# Patient Record
Sex: FEMALE | Race: WHITE | Employment: UNEMPLOYED | URBAN - METROPOLITAN AREA
[De-identification: names, ages, dates, MRNs, and addresses within clinical notes are randomized per-mention and may not be internally consistent; named-entity substitution may affect disease eponyms.]

---

## 2017-01-09 DIAGNOSIS — J45.21 MILD INTERMITTENT ASTHMA WITH ACUTE EXACERBATION: ICD-10-CM

## 2017-01-09 RX ORDER — ALBUTEROL SULFATE 90 UG/1
2 AEROSOL, METERED RESPIRATORY (INHALATION)
Qty: 3 INHALER | Refills: 3 | Status: SHIPPED | OUTPATIENT
Start: 2017-01-09 | End: 2019-01-03 | Stop reason: ALTCHOICE

## 2017-02-13 RX ORDER — BUPROPION HYDROCHLORIDE 150 MG/1
TABLET ORAL
Qty: 90 TAB | Refills: 0 | Status: SHIPPED | OUTPATIENT
Start: 2017-02-13 | End: 2019-01-03 | Stop reason: ALTCHOICE

## 2017-08-18 RX ORDER — CLONAZEPAM 0.5 MG/1
TABLET ORAL
Qty: 30 TAB | Refills: 1 | OUTPATIENT
Start: 2017-08-18

## 2017-08-18 RX ORDER — LEVOTHYROXINE SODIUM 112 UG/1
TABLET ORAL
Qty: 90 TAB | Refills: 3 | Status: SHIPPED | OUTPATIENT
Start: 2017-08-18 | End: 2019-01-03 | Stop reason: ALTCHOICE

## 2018-03-22 ENCOUNTER — OFFICE VISIT (OUTPATIENT)
Dept: FAMILY MEDICINE CLINIC | Age: 63
End: 2018-03-22

## 2018-03-22 VITALS
HEART RATE: 67 BPM | OXYGEN SATURATION: 97 % | DIASTOLIC BLOOD PRESSURE: 80 MMHG | TEMPERATURE: 97.5 F | WEIGHT: 162 LBS | RESPIRATION RATE: 20 BRPM | SYSTOLIC BLOOD PRESSURE: 136 MMHG | BODY MASS INDEX: 30.58 KG/M2 | HEIGHT: 61 IN

## 2018-03-22 DIAGNOSIS — N39.0 URINARY TRACT INFECTION WITHOUT HEMATURIA, SITE UNSPECIFIED: ICD-10-CM

## 2018-03-22 DIAGNOSIS — R30.9 PAIN PASSING URINE: Primary | ICD-10-CM

## 2018-03-22 LAB
BILIRUB UR QL STRIP: NEGATIVE
GLUCOSE UR-MCNC: NEGATIVE MG/DL
KETONES P FAST UR STRIP-MCNC: NEGATIVE MG/DL
PH UR STRIP: 7 [PH] (ref 4.6–8)
PROT UR QL STRIP: NEGATIVE
SP GR UR STRIP: 1.02 (ref 1–1.03)
UA UROBILINOGEN AMB POC: NORMAL (ref 0.2–1)
URINALYSIS CLARITY POC: CLEAR
URINALYSIS COLOR POC: YELLOW
URINE BLOOD POC: NEGATIVE
URINE LEUKOCYTES POC: NORMAL
URINE NITRITES POC: NEGATIVE

## 2018-03-22 RX ORDER — CIPROFLOXACIN 250 MG/1
250 TABLET, FILM COATED ORAL EVERY 12 HOURS
Qty: 20 TAB | Refills: 0 | Status: SHIPPED | OUTPATIENT
Start: 2018-03-22 | End: 2018-04-01

## 2018-03-22 RX ORDER — NAPROXEN 500 MG/1
500 TABLET ORAL 2 TIMES DAILY WITH MEALS
Qty: 60 TAB | Refills: 2 | Status: SHIPPED | OUTPATIENT
Start: 2018-03-22 | End: 2019-01-03 | Stop reason: ALTCHOICE

## 2018-03-22 NOTE — MR AVS SNAPSHOT
[address] [address] 
[phone] Patient: Jarvis Gillette MRN:  [id number] Visit Information Date & Time Provider Department Dept. Phone Encounter #  
 3/22/2018  2:40 PM Kaylee Fisher MD [address] [facility] [phone] [id number] Follow-up Instructions Return if symptoms worsen or fail to improve. Upcoming Health Maintenance Date Due  
 BREAST CANCER SCRN MAMMOGRAM 3/8/2007 FOBT Q 1 YEAR AGE 50-75 3/8/2007 ZOSTER VACCINE AGE 60> 1/8/2017 Influenza Age 5 to Adult 8/1/2017 DTaP/Tdap/Td series (2 - Td) 8/19/2024 Allergies as of 3/22/2018  Review Complete On: 3/22/2018 By: Kaylee Fisher MD  
 No Known Allergies Current Immunizations  Reviewed on 10/20/2016 Name Date Influenza Vaccine 10/17/2016 Influenza Vaccine Tom Ricardo) 11/2/2015 Influenza Vaccine (Quad) PF 9/16/2014 Pneumococcal Polysaccharide (PPSV-23) 11/2/2015 Tdap 8/19/2014 Not reviewed this visit You Were Diagnosed With   
  
 Codes Comments Pain passing urine    -  Primary ICD-10-CM: R30.9 ICD-9-CM: 788.1 Urinary tract infection without hematuria, site unspecified     ICD-10-CM: N39.0 ICD-9-CM: 599.0 Vitals BP Pulse Temp Resp Height(growth percentile) Weight(growth percentile) 136/80 67 97.5 °F (36.4 °C) 20 5' 1\" (1.549 m) 162 lb (73.5 kg) SpO2 BMI OB Status Smoking Status 97% 30.61 kg/m2 Hysterectomy Current Every Day Smoker Vitals History BMI and BSA Data Body Mass Index Body Surface Area  
 30.61 kg/m 2 1.78 m 2 Preferred Pharmacy Pharmacy Name Phone [facility] [phone] Your Updated Medication List  
  
   
This list is accurate as of 3/22/18  3:48 PM.  Always use your most recent med list.  
  
  
  
  
 AFLURIA [id number] (PF) Syrg injection Generic drug:  influenza vaccine 2016-17 (5 yr+)(PF)  
  
 albuterol 90 mcg/actuation inhaler Commonly known as:  PROVENTIL HFA, VENTOLIN HFA, PROAIR HFA Take 2 Puffs by inhalation every four (4) hours as needed for Wheezing or Shortness of Breath. Blood-Glucose Meter monitoring kit Dx: E16.2, R55  
  
 buPROPion  mg tablet Commonly known as:  WELLBUTRIN XL  
TAKE ONE TABLET BY MOUTH EVERY MORNING  
  
 ciprofloxacin HCl 250 mg tablet Commonly known as:  CIPRO Take 1 Tab by mouth every twelve (12) hours for 10 days. clonazePAM 1 mg tablet Commonly known as:  Cyn Gordo Take 1 Tab by mouth daily as needed. cyclobenzaprine 10 mg tablet Commonly known as:  FLEXERIL Take 1 Tab by mouth nightly as needed for Muscle Spasm(s). glucose blood VI test strips strip Commonly known as:  ASCENSIA AUTODISC VI, ONE TOUCH ULTRA TEST VI Testing BID, dx: E16.2, R55  
  
 hydroCHLOROthiazide 25 mg tablet Commonly known as:  HYDRODIURIL  
TAKE ONE TABLET BY MOUTH ONCE DAILY Lancets Misc One Touch Delica lancets, testing BID prn, dx: R55, E16.2  
  
 levothyroxine 112 mcg tablet Commonly known as:  SYNTHROID  
TAKE ONE TABLET BY MOUTH ONCE DAILY BEFORE BREAKFAST  
  
 meloxicam 15 mg tablet Commonly known as:  MOBIC  
TAKE ONE TABLET BY MOUTH ONCE DAILY  
  
 methylPREDNISolone 4 mg tablet Commonly known as:  Emmalene Cheers Use as directed  
  
 ondansetron 4 mg disintegrating tablet Commonly known as:  ZOFRAN ODT Take 1 Tab by mouth every eight (8) hours as needed for Nausea. Prescriptions Sent to Pharmacy Refills  
 ciprofloxacin HCl (CIPRO) 250 mg tablet 0 Sig: Take 1 Tab by mouth every twelve (12) hours for 10 days. Class: Normal  
 [facility] [address] [facility] [address] [place] Ph #: [phone] Route: Oral  
  
We Performed the Following AMB POC URINALYSIS DIP STICK AUTO W/O MICRO [89615 CPT(R)] CULTURE, URINE [id number] CPT(R)] Follow-up Instructions Return if symptoms worsen or fail to improve. Introducing [facility] SERVICES! Dear Jose Maria Groves: Thank you for requesting a Goby account. Our records indicate that you already have an active Goby account. You can access your account anytime at [email or web address] Did you know that you can access your hospital and ER discharge instructions at any time in Goby? You can also review all of your test results from your hospital stay or ER visit. Additional Information If you have questions, please visit the Frequently Asked Questions section of the Goby website at [email or web address] Remember, Goby is NOT to be used for urgent needs. For medical emergencies, dial 911. Now available from your iPhone and Android! Please provide this summary of care documentation to your next provider. Your primary care clinician is listed as Earle Richard. If you have any questions after today's visit, please call [phone].

## 2018-03-22 NOTE — PROGRESS NOTES
Patient here for burning urination. UA obtained as per verbal order per Dr. Mack Victor. 1. Have you been to the ER, urgent care clinic since your last visit? Hospitalized since your last visit? No    2. Have you seen or consulted any other health care providers outside of the [address] [place] [address] since your last visit? Include any pap smears or colon screening. No   Results for orders placed or performed in visit on 03/22/18   AMB POC URINALYSIS DIP STICK AUTO W/O MICRO   Result Value Ref Range    Color (UA POC) Yellow     Clarity (UA POC) Clear     Glucose (UA POC) Negative Negative    Bilirubin (UA POC) Negative Negative    Ketones (UA POC) Negative Negative    Specific gravity (UA POC) 1.025 1.001 - 1.035    Blood (UA POC) Negative Negative    pH (UA POC) 7 4.6 - 8.0    Protein (UA POC) Negative Negative    Urobilinogen (UA POC) 0.2 mg/dL 0.2 - 1    Nitrites (UA POC) Negative Negative    Leukocyte esterase (UA POC) Trace Negative         Chief Complaint   Patient presents with    Urinary Burning     She is a 66 y.o. female who presents for evalution. Reviewed PmHx, RxHx, FmHx, SocHx, AllgHx and updated and dated in the chart.     Patient Active Problem List    Diagnosis    Hyperglycemia    Cervical stenosis of spine    Vitamin D deficiency    Hypothyroid    Osteoarthritis    Asthma    Depression    HTN (hypertension), malignant       Review of Systems - negative except as listed above in the HPI    Objective:     Vitals:    03/22/18 1528   BP: 136/80   Pulse: 67   Resp: 20   Temp: 97.5 °F (36.4 °C)   SpO2: 97%   Weight: 162 lb (73.5 kg)   Height: 5' 1\" (1.549 m)     Physical Examination: General appearance - alert, well appearing, and in no distress  Chest - clear to auscultation, no wheezes, rales or rhonchi, symmetric air entry  Heart - normal rate, regular rhythm, normal S1, S2, no murmurs, rubs, clicks or gallops  Abdomen - tenderness noted suprapubic pain to palp    Assessment/ Plan: Diagnoses and all orders for this visit:    1. Pain passing urine  -     AMB POC URINALYSIS DIP STICK AUTO W/O MICRO-pos  -     CULTURE, URINE  -     ciprofloxacin HCl (CIPRO) 250 mg tablet; Take 1 Tab by mouth every twelve (12) hours for 10 days. 2. Urinary tract infection without hematuria, site unspecified  -     CULTURE, URINE  -     ciprofloxacin HCl (CIPRO) 250 mg tablet; Take 1 Tab by mouth every twelve (12) hours for 10 days. Follow-up Disposition:  Return if symptoms worsen or fail to improve. I have discussed the diagnosis with the patient and the intended plan as seen in the above orders. The patient understands and agrees with the plan. The patient has received an after-visit summary and questions were answered concerning future plans. Medication Side Effects and Warnings were discussed with patient  Patient Labs were reviewed and or requested:  Patient Past Records were reviewed and or requested    Fuentes Drake M.D. There are no Patient Instructions on file for this visit.

## 2018-03-23 LAB — BACTERIA UR CULT: NORMAL

## 2018-10-02 RX ORDER — ESCITALOPRAM OXALATE 10 MG/1
10 TABLET ORAL DAILY
Qty: 30 TAB | Refills: 2 | Status: SHIPPED | OUTPATIENT
Start: 2018-10-02 | End: 2018-10-25 | Stop reason: SDUPTHER

## 2018-10-25 ENCOUNTER — TELEPHONE (OUTPATIENT)
Dept: FAMILY MEDICINE CLINIC | Age: 63
End: 2018-10-25

## 2018-10-25 RX ORDER — ESCITALOPRAM OXALATE 20 MG/1
20 TABLET ORAL DAILY
Qty: 90 TAB | Refills: 1 | Status: SHIPPED | OUTPATIENT
Start: 2018-10-25 | End: 2019-01-03 | Stop reason: ALTCHOICE

## 2018-10-25 NOTE — TELEPHONE ENCOUNTER
Pt calling and states she wants to increase her dose of Lexapro from 10 mg to 20 mg and wants a new rx sent to her pharmacy. She can be reached at [phone].

## 2019-01-03 ENCOUNTER — OFFICE VISIT (OUTPATIENT)
Dept: FAMILY MEDICINE CLINIC | Age: 64
End: 2019-01-03

## 2019-01-03 VITALS
WEIGHT: 157 LBS | DIASTOLIC BLOOD PRESSURE: 74 MMHG | BODY MASS INDEX: 29.64 KG/M2 | SYSTOLIC BLOOD PRESSURE: 111 MMHG | RESPIRATION RATE: 18 BRPM | TEMPERATURE: 98.7 F | HEIGHT: 61 IN | OXYGEN SATURATION: 97 % | HEART RATE: 66 BPM

## 2019-01-03 DIAGNOSIS — Z98.84 S/P BARIATRIC SURGERY: ICD-10-CM

## 2019-01-03 DIAGNOSIS — E03.9 HYPOTHYROIDISM, UNSPECIFIED TYPE: Primary | ICD-10-CM

## 2019-01-03 NOTE — PROGRESS NOTES
Chief Complaint   Patient presents with   [facility]     pt states she is here to discuss lab results     Pt has labs done with her bariatric surgeon, wanted to follow up today due to concerns with continued hair thinning. Pt is not currently on any medication. Subjective: (As above and below)     Chief Complaint   Patient presents with   [facility]     pt states she is here to discuss lab results     she is a 66y.o. year old female who presents for evaluation. Reviewed PmHx, RxHx, FmHx, SocHx, AllgHx and updated in chart. Review of Systems - negative except as listed above    Objective:     Vitals:    01/03/19 0814   BP: 111/74   Pulse: 66   Resp: 18   Temp: 98.7 °F (37.1 °C)   TempSrc: Oral   SpO2: 97%   Weight: 157 lb (71.2 kg)   Height: 5' 1\" (1.549 m)     Physical Examination: General appearance - alert, well appearing, and in no distress  Mental status - alert, oriented to person, place, and time  Chest - clear to auscultation, no wheezes, rales or rhonchi, symmetric air entry  Heart - normal rate, regular rhythm, normal S1, S2, no murmurs, rubs, clicks or gallops    Assessment/ Plan:   1. Hypothyroidism, unspecified type  -TSH normal off medication    2. S/P bariatric surgery  -all labs stable       I have discussed the diagnosis with the patient and the intended plan as seen in the above orders. The patient has received an after-visit summary and questions were answered concerning future plans.      Medication Side Effects and Warnings were discussed with patient: yes  Patient Labs were reviewed: yes  Patient Past Records were reviewed:  yes    Anneliese Wood M.D.

## 2019-01-03 NOTE — PROGRESS NOTES
Chief Complaint   Patient presents with   [facility]     pt states she is here to discuss lab results     1. Have you been to the ER, urgent care clinic since your last visit? Hospitalized since your last visit? No    2. Have you seen or consulted any other health care providers outside of the [address] [place] [address] since your last visit? Include any pap smears or colon screening. Pt states she had he gallbladder removed a week before [place] but is unsure of the date. She states the procedure was performed by Dr. Kendal Snell.     Visit Vitals  /74 (BP 1 Location: Left arm, BP Patient Position: Sitting)   Pulse 66   Temp 98.7 °F (37.1 °C) (Oral)   Resp 18   Ht 5' 1\" (1.549 m)   Wt 157 lb (71.2 kg)   SpO2 97%   BMI 29.66 kg/m²

## 2019-06-06 RX ORDER — ESCITALOPRAM OXALATE 20 MG/1
TABLET ORAL
Qty: 90 TAB | Refills: 1 | Status: SHIPPED | OUTPATIENT
Start: 2019-06-06 | End: 2020-06-01 | Stop reason: SDUPTHER

## 2020-05-19 ENCOUNTER — VIRTUAL VISIT (OUTPATIENT)
Dept: FAMILY MEDICINE CLINIC | Age: 65
End: 2020-05-19

## 2020-05-19 VITALS — SYSTOLIC BLOOD PRESSURE: 128 MMHG | DIASTOLIC BLOOD PRESSURE: 75 MMHG

## 2020-05-19 DIAGNOSIS — G89.29 CHRONIC PAIN OF BOTH KNEES: ICD-10-CM

## 2020-05-19 DIAGNOSIS — Z98.84 S/P BARIATRIC SURGERY: ICD-10-CM

## 2020-05-19 DIAGNOSIS — M25.561 CHRONIC PAIN OF BOTH KNEES: ICD-10-CM

## 2020-05-19 DIAGNOSIS — R73.9 HYPERGLYCEMIA: ICD-10-CM

## 2020-05-19 DIAGNOSIS — M25.562 CHRONIC PAIN OF BOTH KNEES: ICD-10-CM

## 2020-05-19 DIAGNOSIS — I10 HTN (HYPERTENSION), MALIGNANT: Primary | ICD-10-CM

## 2020-05-19 DIAGNOSIS — E03.9 HYPOTHYROIDISM, UNSPECIFIED TYPE: ICD-10-CM

## 2020-05-19 NOTE — PROGRESS NOTES
Here for VV    Has not been seen in over 1.5 years    Has HTN and low thyroid and hx of inc sugars    Needs refills    Had inc bilat knee pain--wants a cortisone shot    Off bp rx due to 150lbs wt loss with surgery    Consent: Jase Pereira, who was seen by synchronous (real-time) audio-video technology, and/or her healthcare decision maker, is aware that this patient-initiated, Telehealth encounter on 5/19/2020 is a billable service, with coverage as determined by her insurance carrier. She is aware that she may receive a bill and has provided verbal consent to proceed: Yes. 712  Subjective:   Jase Pereira is a 63 y.o. female who was seen for No chief complaint on file. Prior to Admission medications    Medication Sig Start Date End Date Taking?  Authorizing Provider   escitalopram oxalate (LEXAPRO) 20 mg tablet TAKE 1 TABLET BY MOUTH ONCE DAILY 6/6/19   Jarvis Richard MD     No Known Allergies  Patient Active Problem List    Diagnosis    Hyperglycemia    Cervical stenosis of spine    Vitamin D deficiency    Hypothyroid    Osteoarthritis    Asthma    Depression    HTN (hypertension), malignant     Patient Active Problem List   Diagnosis Code    Depression F32.9    HTN (hypertension), malignant I10    Osteoarthritis M19.90    Asthma J45.909    Hypothyroid E03.9    Vitamin D deficiency E55.9    Cervical stenosis of spine M48.02    Hyperglycemia R73.9     Patient Active Problem List    Diagnosis Date Noted    Hyperglycemia 11/06/2012    Cervical stenosis of spine 12/23/2011    Vitamin D deficiency 02/28/2011    Hypothyroid 02/25/2011    Osteoarthritis 12/08/2010    Asthma 12/08/2010    Depression 11/24/2010    HTN (hypertension), malignant 11/24/2010     Current Outpatient Medications   Medication Sig Dispense Refill    escitalopram oxalate (LEXAPRO) 20 mg tablet TAKE 1 TABLET BY MOUTH ONCE DAILY 90 Tab 1     Past Medical History:   Diagnosis Date    Arthritis     Asthma     Asthma     Cataract 2/10/12    LEFT EYE    Difficult intubation 2/16/12    per CRNA- PT STATES SHE HAS A SMALL MOUTH    Hypertension     Murmur     Nausea & vomiting     Thyroid disease     Unspecified sleep apnea     UNDIAGNOSED, PT SUSPECTS    Urinary incontinence        ROS    Objective:   Vital Signs: (As obtained by patient/caregiver at home)  Visit Vitals  /75        [INSTRUCTIONS:  \"[x]\" Indicates a positive item  \"[]\" Indicates a negative item  -- DELETE ALL ITEMS NOT EXAMINED]    Constitutional: [x] Appears well-developed and well-nourished [x] No apparent distress      [] Abnormal -     Mental status: [x] Alert and awake  [x] Oriented to person/place/time [x] Able to follow commands    [] Abnormal -     Eyes:   EOM    [x]  Normal    [] Abnormal -   Sclera  [x]  Normal    [] Abnormal -          Discharge [x]  None visible   [] Abnormal -     HENT: [x] Normocephalic, atraumatic  [] Abnormal -   [x] Mouth/Throat: Mucous membranes are moist    External Ears [x] Normal  [] Abnormal -    Neck: [x] No visualized mass [] Abnormal -     Pulmonary/Chest: [x] Respiratory effort normal   [x] No visualized signs of difficulty breathing or respiratory distress        [] Abnormal -        Neurological:        [x] No Facial Asymmetry (Cranial nerve 7 motor function) (limited exam due to video visit)          [x] No gaze palsy        [] Abnormal -          Skin:        [x] No significant exanthematous lesions or discoloration noted on facial skin         [] Abnormal -            Psychiatric:       [x] Normal Affect [] Abnormal -        [x] No Hallucinations    Other pertinent observable physical exam findings:-              Assessment & Plan:   Diagnoses and all orders for this visit:    1. HTN (hypertension), malignant  -     LIPID PANEL; Future  -     METABOLIC PANEL, COMPREHENSIVE; Future  -at gpal off rx    2. Hypothyroidism, unspecified type  -     TSH 3RD GENERATION;  Future  -not on rx and wants to take [place] if needed    3. Hyperglycemia  -     METABOLIC PANEL, COMPREHENSIVE; Future  -     HEMOGLOBIN A1C WITH EAG; Future    4. Chronic pain of both knees  -     REFERRAL TO ORTHOPEDICS    5. S/P bariatric surgery  -     CBC WITH AUTOMATED DIFF; Future  -     VITAMIN D, 25 HYDROXY; Future  -     VITAMIN B12; Future                    We discussed the expected course, resolution and complications of the diagnosis(es) in detail. Medication risks, benefits, costs, interactions, and alternatives were discussed as indicated. I advised her to contact the office if her condition worsens, changes or fails to improve as anticipated. She expressed understanding with the diagnosis(es) and plan. Freida Ma is a 63 y.o. female being evaluated by a video visit encounter for concerns as above. A caregiver was present when appropriate. Due to this being a TeleHealth encounter (During [place] [address] public health emergency), evaluation of the following organ systems was limited: Vitals/Constitutional/EENT/Resp/CV/GI//MS/Neuro/Skin/Heme-Lymph-Imm. Pursuant to the emergency declaration under the [facility], 1135 waiver authority and the [employer] and [employer] Act, this Virtual  Visit was conducted, with patient's (and/or legal guardian's) consent, to reduce the patient's risk of exposure to COVID-19 and provide necessary medical care. Services were provided through a video synchronous discussion virtually to substitute for in-person clinic visit. Patient and provider were located at their individual homes.         Ambreen Jose MD

## 2020-05-21 ENCOUNTER — TELEPHONE (OUTPATIENT)
Dept: FAMILY MEDICINE CLINIC | Age: 65
End: 2020-05-21

## 2020-05-21 RX ORDER — DICLOFENAC SODIUM 10 MG/G
GEL TOPICAL 4 TIMES DAILY
Qty: 100 G | Refills: 1 | Status: SHIPPED | OUTPATIENT
Start: 2020-05-21 | End: 2020-10-23 | Stop reason: SDUPTHER

## 2020-05-21 NOTE — TELEPHONE ENCOUNTER
Patient would like a C-peptid added to her labs, scheduled for tomorrow    ALSO patient saw dr Darling Jaimes today and he told her to get her PCP to order the Voltaran gel for her knee pain    Pharmacy on file is correct

## 2020-05-22 ENCOUNTER — HOSPITAL ENCOUNTER (OUTPATIENT)
Dept: LAB | Age: 65
Discharge: HOME | End: 2020-05-22

## 2020-05-22 DIAGNOSIS — I10 HTN (HYPERTENSION), MALIGNANT: ICD-10-CM

## 2020-05-22 DIAGNOSIS — E03.9 HYPOTHYROIDISM, UNSPECIFIED TYPE: ICD-10-CM

## 2020-05-22 DIAGNOSIS — R73.9 HYPERGLYCEMIA: ICD-10-CM

## 2020-05-22 DIAGNOSIS — Z98.84 S/P BARIATRIC SURGERY: ICD-10-CM

## 2020-05-22 LAB
25(OH)D3 SERPL-MCNC: 30.8 NG/ML (ref 30–100)
ALBUMIN SERPL-MCNC: 3.9 G/DL (ref 3.5–5)
ALBUMIN/GLOB SERPL: 1.4 {RATIO} (ref 1.1–2.2)
ALP SERPL-CCNC: 63 U/L (ref 45–117)
ALT SERPL-CCNC: 39 U/L (ref 12–78)
ANION GAP SERPL CALC-SCNC: 4 MMOL/L (ref 5–15)
AST SERPL-CCNC: 25 U/L (ref 15–37)
BASOPHILS # BLD: 0 K/UL (ref 0–0.1)
BASOPHILS NFR BLD: 0 % (ref 0–1)
BILIRUB SERPL-MCNC: 0.3 MG/DL (ref 0.2–1)
BUN SERPL-MCNC: 16 MG/DL (ref 6–20)
BUN/CREAT SERPL: 23 (ref 12–20)
CALCIUM SERPL-MCNC: 8.8 MG/DL (ref 8.5–10.1)
CHLORIDE SERPL-SCNC: 108 MMOL/L (ref 97–108)
CHOLEST SERPL-MCNC: 162 MG/DL
CO2 SERPL-SCNC: 26 MMOL/L (ref 21–32)
CREAT SERPL-MCNC: 0.69 MG/DL (ref 0.55–1.02)
DIFFERENTIAL METHOD BLD: ABNORMAL
EOSINOPHIL # BLD: 0 K/UL (ref 0–0.4)
EOSINOPHIL NFR BLD: 0 % (ref 0–7)
ERYTHROCYTE [DISTWIDTH] IN BLOOD BY AUTOMATED COUNT: 15.7 % (ref 11.5–14.5)
EST. AVERAGE GLUCOSE BLD GHB EST-MCNC: 105 MG/DL
GLOBULIN SER CALC-MCNC: 2.7 G/DL (ref 2–4)
GLUCOSE SERPL-MCNC: 95 MG/DL (ref 65–100)
HBA1C MFR BLD: 5.3 % (ref 4–5.6)
HCT VFR BLD AUTO: 35.7 % (ref 35–47)
HDLC SERPL-MCNC: 78 MG/DL
HDLC SERPL: 2.1 {RATIO} (ref 0–5)
HGB BLD-MCNC: 11.2 G/DL (ref 11.5–16)
IMM GRANULOCYTES # BLD AUTO: 0 K/UL (ref 0–0.04)
IMM GRANULOCYTES NFR BLD AUTO: 0 % (ref 0–0.5)
LDLC SERPL CALC-MCNC: 74.4 MG/DL (ref 0–100)
LIPID PROFILE,FLP: NORMAL
LYMPHOCYTES # BLD: 0.9 K/UL (ref 0.8–3.5)
LYMPHOCYTES NFR BLD: 9 % (ref 12–49)
MCH RBC QN AUTO: 29.2 PG (ref 26–34)
MCHC RBC AUTO-ENTMCNC: 31.4 G/DL (ref 30–36.5)
MCV RBC AUTO: 93.2 FL (ref 80–99)
MONOCYTES # BLD: 0.5 K/UL (ref 0–1)
MONOCYTES NFR BLD: 5 % (ref 5–13)
NEUTS SEG # BLD: 8.5 K/UL (ref 1.8–8)
NEUTS SEG NFR BLD: 86 % (ref 32–75)
NRBC # BLD: 0 K/UL (ref 0–0.01)
NRBC BLD-RTO: 0 PER 100 WBC
PLATELET # BLD AUTO: 262 K/UL (ref 150–400)
PMV BLD AUTO: 12.6 FL (ref 8.9–12.9)
POTASSIUM SERPL-SCNC: 4 MMOL/L (ref 3.5–5.1)
PROT SERPL-MCNC: 6.6 G/DL (ref 6.4–8.2)
RBC # BLD AUTO: 3.83 M/UL (ref 3.8–5.2)
SODIUM SERPL-SCNC: 138 MMOL/L (ref 136–145)
TRIGL SERPL-MCNC: 48 MG/DL (ref ?–150)
TSH SERPL DL<=0.05 MIU/L-ACNC: 0.4 UIU/ML (ref 0.36–3.74)
VIT B12 SERPL-MCNC: 294 PG/ML (ref 193–986)
VLDLC SERPL CALC-MCNC: 9.6 MG/DL
WBC # BLD AUTO: 10 K/UL (ref 3.6–11)

## 2020-05-22 NOTE — TELEPHONE ENCOUNTER
Pt has been advised stated she wanted a C-Peptide to be ordered due to hx of bariatric surgery and hx of hyperglycemia. Pt would like to also discuss getting an order for cgm,advisd pt once labs have been resulted and reviewed, provider can order any additional labs and cgm order if necessary.

## 2020-06-01 ENCOUNTER — VIRTUAL VISIT (OUTPATIENT)
Dept: FAMILY MEDICINE CLINIC | Age: 65
End: 2020-06-01

## 2020-06-01 DIAGNOSIS — F32.A DEPRESSION, UNSPECIFIED DEPRESSION TYPE: Primary | ICD-10-CM

## 2020-06-01 DIAGNOSIS — F41.9 ANXIETY: ICD-10-CM

## 2020-06-01 DIAGNOSIS — G47.00 INSOMNIA, UNSPECIFIED TYPE: ICD-10-CM

## 2020-06-01 DIAGNOSIS — R79.89 ABNORMAL TSH: ICD-10-CM

## 2020-06-01 RX ORDER — TRAZODONE HYDROCHLORIDE 50 MG/1
50 TABLET ORAL
Qty: 90 TAB | Refills: 1 | Status: SHIPPED | OUTPATIENT
Start: 2020-06-01 | End: 2020-08-06 | Stop reason: SDUPTHER

## 2020-06-01 RX ORDER — ESCITALOPRAM OXALATE 20 MG/1
TABLET ORAL
Qty: 90 TAB | Refills: 1 | Status: SHIPPED | OUTPATIENT
Start: 2020-06-01 | End: 2020-08-13 | Stop reason: SDUPTHER

## 2020-06-01 NOTE — PROGRESS NOTES
Consent: Jack Díaz, who was seen by synchronous (real-time) audio-video technology, and/or her healthcare decision maker, is aware that this patient-initiated, Telehealth encounter on 6/1/2020 is a billable service, with coverage as determined by her insurance carrier. She is aware that she may receive a bill and has provided verbal consent to proceed: Yes. 712      Subjective:   Jack Díaz is a 63 y.o. female who was seen for Medication Evaluation  States her  had massive stroke and has been having increased stress taking care of him. Mother passed away 2 years ago and is still dealing w/ grief from that. Has struggled w/ anxiety and depression for \"as long as I can remember\"   Previously on 20mg lexapro and 150mg wellbutrin but stopped all meds over 2 years ago b/c she didn't want to take medication anymore. States she feels she needs to get back on something, feels she is in a hole and needs help. Denies SI/HI. Has been working with a therapist since February and this helps somewhat. 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 Had bariatric surgery and has lost significant amount of weight  On keto diet, labs recently drawn and all were WNL  Was able to DC HTN medication and TSH now normal off medication as well. Wants to know if it is usual to be able to come off thyroid medication. Wants to know what type of thyroid test was drawn and if it is possible she has Hashimoto's thyroiditis. Would also like something for sleep  States she feels tired all the time, currently taking 2 benadryl nightly to help with allergies as well as sleep and feels it is no longer effective for sleep. Falls asleep at 11pm and wakes by 2am most nights. Believes lack of sleep has contributed to mood and stress/coping. Prior to Admission medications    Medication Sig Start Date End Date Taking?  Authorizing Provider   escitalopram oxalate (LEXAPRO) 20 mg tablet TAKE 1 TABLET BY MOUTH ONCE DAILY 6/1/20  Yes Kwasi Archibald NP   traZODone (DESYREL) 50 mg tablet Take 1 Tab by mouth nightly as needed for Sleep. 6/1/20  Yes Yassine QUINN NP   diclofenac (VOLTAREN) 1 % gel Apply  to affected area four (4) times daily. 5/21/20  Yes Kiara Johnson MD   escitalopram oxalate (LEXAPRO) 20 mg tablet TAKE 1 TABLET BY MOUTH ONCE DAILY 6/6/19 6/1/20  Lior Richard MD     No Known Allergies    Patient Active Problem List    Diagnosis Date Noted    Hyperglycemia 11/06/2012    Cervical stenosis of spine 12/23/2011    Vitamin D deficiency 02/28/2011    Hypothyroid 02/25/2011    Osteoarthritis 12/08/2010    Asthma 12/08/2010    Depression 11/24/2010    HTN (hypertension), malignant 11/24/2010       ROS - negative except as listed above in the HPI      Objective:   Vital Signs: (As obtained by patient/caregiver at home)  There were no vitals taken for this visit. Physical Exam:   General: alert, cooperative, no distress   Mental  status: normal mood, behavior, speech, dress, motor activity, and thought processes, able to follow commands   HEENT: normocephalic, atraumatic    Eyes:  extraocular movements intact, sclera normal, no visible discharge   Ears:  external ears normal   Mouth/Throat:  mucous memrates appear moist    Neck: no visualized mass   Resp: respiratory effort is normal, breathing appears non-labored, no visualized signs of respiratory distress   Neuro: no gross deficits   Skin: no discoloration or lesions of concern on visible areas   Psychiatric: normal affect, consistent with stated mood, no evidence of hallucinations      Additional exam findings:      Assessment & Plan:   Diagnoses and all orders for this visit:    1. Depression, unspecified depression type  2. Anxiety  -     escitalopram oxalate (LEXAPRO) 20 mg tablet; TAKE 1 TABLET BY MOUTH ONCE DAILY  - Restart lexapro.  Advised to take 1/2 tab for 1 week and can increase to previous dose as tolerated. Reviewed SE/ADRs  - F/u if no improvement    2. Insomnia, unspecified type  -     traZODone (DESYREL) 50 mg tablet; Take 1 Tab by mouth nightly as needed for Sleep. - New Rx, advised on use and SE/ADRs  - Advised can start w/ 1/2 tab and increase PRN    3. Abnormal TSH  - TSH has normalized s/p weight loss, discussed unlikely hashimoto's, no need for further testing (T3/T4 or antibody test at this time). Follow-up and Dispositions    · Return if symptoms worsen or fail to improve. I spent at least 25 minutes with this established patient, and >50% of the time was spent counseling and/or coordinating care regarding anxiety and depression, insomnia, thyroid, keto diet. We discussed the expected course, resolution and complications of the diagnosis(es) in detail. Medication risks, benefits, costs, interactions, and alternatives were discussed as indicated. I advised her to contact the office if her condition worsens, changes or fails to improve as anticipated. She expressed understanding with the diagnosis(es) and plan. Deejay Raphael is a 63 y.o. female being evaluated by a video visit encounter for concerns as above. A caregiver was present when appropriate. Due to this being a TeleHealth encounter (During [facility] health emergency), evaluation of the following organ systems was limited: Vitals/Constitutional/EENT/Resp/CV/GI//MS/Neuro/Skin/Heme-Lymph-Imm. Pursuant to the emergency declaration under the [facility], 1135 waiver authority and the [employer] and [employer] Act, this Virtual  Visit was conducted, with patient's (and/or legal guardian's) consent, to reduce the patient's risk of exposure to COVID-19 and provide necessary medical care. Services were provided through a video synchronous discussion virtually to substitute for in-person clinic visit.    Patient and provider were located at their individual homes.         Marky Queen NP

## 2020-08-06 DIAGNOSIS — G47.00 INSOMNIA, UNSPECIFIED TYPE: ICD-10-CM

## 2020-08-06 RX ORDER — TRAZODONE HYDROCHLORIDE 50 MG/1
TABLET ORAL
Qty: 180 TAB | Refills: 1 | Status: SHIPPED | OUTPATIENT
Start: 2020-08-06 | End: 2020-08-13 | Stop reason: SDUPTHER

## 2020-08-13 ENCOUNTER — VIRTUAL VISIT (OUTPATIENT)
Dept: FAMILY MEDICINE CLINIC | Age: 65
End: 2020-08-13
Payer: COMMERCIAL

## 2020-08-13 DIAGNOSIS — E16.2 HYPOGLYCEMIA: Primary | ICD-10-CM

## 2020-08-13 DIAGNOSIS — F41.9 ANXIETY: ICD-10-CM

## 2020-08-13 DIAGNOSIS — D64.9 ANEMIA, UNSPECIFIED TYPE: ICD-10-CM

## 2020-08-13 DIAGNOSIS — F32.A DEPRESSION, UNSPECIFIED DEPRESSION TYPE: ICD-10-CM

## 2020-08-13 DIAGNOSIS — G47.00 INSOMNIA, UNSPECIFIED TYPE: ICD-10-CM

## 2020-08-13 PROCEDURE — 99214 OFFICE O/P EST MOD 30 MIN: CPT | Performed by: NURSE PRACTITIONER

## 2020-08-13 RX ORDER — ESCITALOPRAM OXALATE 20 MG/1
TABLET ORAL
Qty: 90 TAB | Refills: 1 | Status: SHIPPED | OUTPATIENT
Start: 2020-08-13 | End: 2021-04-15 | Stop reason: SDUPTHER

## 2020-08-13 RX ORDER — BUSPIRONE HYDROCHLORIDE 7.5 MG/1
7.5 TABLET ORAL
Qty: 60 TAB | Refills: 2 | Status: SHIPPED | OUTPATIENT
Start: 2020-08-13 | End: 2020-10-23 | Stop reason: SDUPTHER

## 2020-08-13 RX ORDER — TRAZODONE HYDROCHLORIDE 50 MG/1
TABLET ORAL
Qty: 180 TAB | Refills: 1 | Status: SHIPPED | OUTPATIENT
Start: 2020-08-13 | End: 2020-11-18

## 2020-08-13 NOTE — PROGRESS NOTES
Consent: Frances Wilhelm, who was seen by synchronous (real-time) audio-video technology, and/or her healthcare decision maker, is aware that this patient-initiated, Telehealth encounter on 8/13/2020 is a billable service, with coverage as determined by her insurance carrier. She is aware that she may receive a bill and has provided verbal consent to proceed: Yes. 712      Subjective:   Frances Wilhelm is a 63 y.o. female who was seen for Medication Refill (patient has a virtual appointment today for a medication refill); Finger Pain (she also has some issues with her pointer finger/nail); and Dizziness (patient stated she has been passing out as of late and isnt sure why)    Would like med refill of lexapro and trazodone  States anxiety has been at an all time high lately  Lexapro and trazodone has been working well for her in the past   Increased stressors recently surrounding needing new roof on house. Would like to try something as needed to help w/ anxiety during this time    Has been passing out recently, she believes d/t hypoglycemia  She is s/p bariatric surgery and states that she has struggled w/ this \"on an off\" for years.   Notes that she has \"hit the floor 4 times in the last week\"   States she has been having lack of appetite, forgets to eat and did not eat for 2 days  She has started tracking again and using ambreen to remind her to eat and has not had any episodes of passing out since starting to eat regularly again  States her BG has been as low as 40-50's in the past but hasn't checked recently during episodes  She admits to avoiding carbs and is resistant to incorporating carbs to help w/ BG  States she has protein shake every morning, then eats around 1pm and 5pm, primarily protein / low carb  After bariatric surgery she was on iron supplement but hasn't taken recently  Her last Hgb was a little low and she recently tried to donate blood and was denied d/t Hgb 11.1   Recognizes she should be supplementing for life s/p bariatric surgery. Also cut her pointer fingernail on left hand 1/4 way off  Denies s/sxs of infection today. Prior to Admission medications    Medication Sig Start Date End Date Taking? Authorizing Provider   escitalopram oxalate (LEXAPRO) 20 mg tablet TAKE 1 TABLET BY MOUTH ONCE DAILY 8/13/20  Yes Cassidy Kim NP   traZODone (DESYREL) 50 mg tablet Take 1 to 2 tabs nightly as needed for insomnia 8/13/20  Yes Santa QUINN NP   busPIRone (BUSPAR) 7.5 mg tablet Take 1 Tab by mouth two (2) times daily as needed (anxiety). 8/13/20  Yes Santa QUINN NP   diclofenac (VOLTAREN) 1 % gel Apply  to affected area four (4) times daily. 5/21/20  Yes Zaid Crook MD   traZODone (DESYREL) 50 mg tablet Take 1 to 2 tabs nightly as needed for insomnia 8/6/20 8/13/20  Cassidy Kim NP   escitalopram oxalate (LEXAPRO) 20 mg tablet TAKE 1 TABLET BY MOUTH ONCE DAILY 6/1/20 8/13/20  Cassidy Kim NP     No Known Allergies    Patient Active Problem List    Diagnosis Date Noted    Hyperglycemia 11/06/2012    Cervical stenosis of spine 12/23/2011    Vitamin D deficiency 02/28/2011    Hypothyroid 02/25/2011    Osteoarthritis 12/08/2010    Asthma 12/08/2010    Depression 11/24/2010    HTN (hypertension), malignant 11/24/2010       ROS - negative except as listed above in the HPI      Objective:   Vital Signs: (As obtained by patient/caregiver at home)  There were no vitals taken for this visit.      Physical Exam:   General: alert, cooperative, no distress   Mental  status: normal mood, behavior, speech, dress, motor activity, and thought processes, able to follow commands   HEENT: normocephalic, atraumatic    Eyes:  extraocular movements intact, sclera normal, no visible discharge   Ears:  external ears normal   Mouth/Throat:  mucous memrates appear moist    Neck: no visualized mass   Resp: respiratory effort is normal, breathing appears non-labored, no visualized signs of respiratory distress   Neuro: no gross deficits   Skin: no discoloration or lesions of concern on visible areas   Psychiatric: normal affect, consistent with stated mood, no evidence of hallucinations      Additional exam findings:      Assessment & Plan:   Diagnoses and all orders for this visit:    1. Hypoglycemia  -     METABOLIC PANEL, COMPREHENSIVE; Future  - Encouraged pt to incorporate more carbs to prevent episodes of hypoglycemia and \"passing out. \" She is resistant to this s/p bariatric surgery and weight loss efforts. Reviewed complex carb sources and advised on importance of carbs to bring up BG when low. 2. Anemia, unspecified type  -     CBC WITH AUTOMATED DIFF; Future  -     IRON PROFILE; Future  -     FERRITIN; Future  - Will notify patient of results when available and alter plan as needed. Start iron supp. 3. Depression, unspecified depression type  -     escitalopram oxalate (LEXAPRO) 20 mg tablet; TAKE 1 TABLET BY MOUTH ONCE DAILY  - Refilled    4. Anxiety  -     busPIRone (BUSPAR) 7.5 mg tablet; Take 1 Tab by mouth two (2) times daily as needed (anxiety). - New Rx, advised on use and SE/ADRs    5. Insomnia, unspecified type  -     traZODone (DESYREL) 50 mg tablet; Take 1 to 2 tabs nightly as needed for insomnia  - Refilled        Follow-up and Dispositions    · Return if symptoms worsen or fail to improve. I spent at least 25 minutes with this established patient, and >50% of the time was spent counseling and/or coordinating care regarding anxiety, depression, finger injury, hypogycemia, anemia, passing out      We discussed the expected course, resolution and complications of the diagnosis(es) in detail. Medication risks, benefits, costs, interactions, and alternatives were discussed as indicated. I advised her to contact the office if her condition worsens, changes or fails to improve as anticipated. She expressed understanding with the diagnosis(es) and plan.        Kriss ROSARIO Yomaira Jama is a 63 y.o. female being evaluated by a video visit encounter for concerns as above. A caregiver was present when appropriate. Due to this being a TeleHealth encounter (During [facility]E-42 public [facility] emergency), evaluation of the following organ systems was limited: Vitals/Constitutional/EENT/Resp/CV/GI//MS/Neuro/Skin/Heme-Lymph-Imm. Pursuant to the emergency declaration under the [address] [place] [address], [facility] waiver authority and the [employer] and [employer] Act, this Virtual  Visit was conducted, with patient's (and/or legal guardian's) consent, to reduce the patient's risk of exposure to COVID-19 and provide necessary medical care. Services were provided through a video synchronous discussion virtually to substitute for in-person clinic visit. Patient and provider were located at their individual homes.         Randolph Healy NP

## 2020-08-24 ENCOUNTER — TELEPHONE (OUTPATIENT)
Dept: FAMILY MEDICINE CLINIC | Age: 65
End: 2020-08-24

## 2020-08-25 ENCOUNTER — VIRTUAL VISIT (OUTPATIENT)
Dept: FAMILY MEDICINE CLINIC | Age: 65
End: 2020-08-25
Payer: COMMERCIAL

## 2020-08-25 DIAGNOSIS — I10 HTN (HYPERTENSION), MALIGNANT: ICD-10-CM

## 2020-08-25 DIAGNOSIS — E16.2 HYPOGLYCEMIA: ICD-10-CM

## 2020-08-25 DIAGNOSIS — D64.9 ANEMIA, UNSPECIFIED TYPE: ICD-10-CM

## 2020-08-25 DIAGNOSIS — E03.9 HYPOTHYROIDISM, UNSPECIFIED TYPE: ICD-10-CM

## 2020-08-25 DIAGNOSIS — Z98.84 S/P BARIATRIC SURGERY: ICD-10-CM

## 2020-08-25 DIAGNOSIS — M79.646 PAIN OF FINGER, UNSPECIFIED LATERALITY: Primary | ICD-10-CM

## 2020-08-25 PROCEDURE — 99213 OFFICE O/P EST LOW 20 MIN: CPT | Performed by: FAMILY MEDICINE

## 2020-08-25 RX ORDER — CEPHALEXIN 500 MG/1
1000 CAPSULE ORAL 2 TIMES DAILY
Qty: 40 CAP | Refills: 0 | Status: SHIPPED | OUTPATIENT
Start: 2020-08-25 | End: 2020-09-04

## 2020-08-25 NOTE — PROGRESS NOTES
Patient is here today with complaints of finger pain and redness after cutting it over 2 weeks ago. She would like antibiotic. Patient not having any more hypoglycemic episodes but has not come in to get her lab work at this time. Consent: Tena Ladd, who was seen by synchronous (real-time) audio-video technology, and/or her healthcare decision maker, is aware that this patient-initiated, Telehealth encounter on 8/25/2020 is a billable service, with coverage as determined by her insurance carrier. She is aware that she may receive a bill and has provided verbal consent to proceed: YES-Consent obtained within past 12 months        712  Subjective:   Tena Ladd is a 63 y.o. female who was seen for No chief complaint on file. Prior to Admission medications    Medication Sig Start Date End Date Taking? Authorizing Provider   cephALEXin (KEFLEX) 500 mg capsule Take 2 Caps by mouth two (2) times a day for 10 days. 8/25/20 9/4/20 Yes Donnell Quinn MD   escitalopram oxalate (LEXAPRO) 20 mg tablet TAKE 1 TABLET BY MOUTH ONCE DAILY 8/13/20   Ashley Montero NP   traZODone (DESYREL) 50 mg tablet Take 1 to 2 tabs nightly as needed for insomnia 8/13/20   Dl QUINN NP   busPIRone (BUSPAR) 7.5 mg tablet Take 1 Tab by mouth two (2) times daily as needed (anxiety). 8/13/20   Ashley Montero NP   diclofenac (VOLTAREN) 1 % gel Apply  to affected area four (4) times daily. 5/21/20   Donnell Quinn MD     No Known Allergies    ROS    Objective:   Vital Signs: (As obtained by patient/caregiver at home)  There were no vitals taken for this visit. [IP                     Assessment & Plan:   Diagnoses and all orders for this visit:    1. Pain of finger, unspecified laterality  -     cephALEXin (KEFLEX) 500 mg capsule; Take 2 Caps by mouth two (2) times a day for 10 days.  -Patient cannot sign onto doxy or Clickot for virtual visit therefore we did this by telephone call    2.  Hypoglycemia  -Patient is to come to get lab work done  3. S/P bariatric surgery    4. HTN (hypertension), malignant    5. Hypothyroidism, unspecified type    6. Anemia, unspecified type                    We discussed the expected course, resolution and complications of the diagnosis(es) in detail. Medication risks, benefits, costs, interactions, and alternatives were discussed as indicated. I advised her to contact the office if her condition worsens, changes or fails to improve as anticipated. She expressed understanding with the diagnosis(es) and plan. Frances Wilhelm is a 63 y.o. female being evaluated by a video visit encounter for concerns as above. A caregiver was present when appropriate. Due to this being a TeleHealth encounter (During [facility] public health emergency), evaluation of the following organ systems was limited: Vitals/Constitutional/EENT/Resp/CV/GI//MS/Neuro/Skin/Heme-Lymph-Imm. Pursuant to the emergency declaration under the [facility] waiver authority and the [employer] and [employer] Act, this Virtual  Visit was conducted, with patient's (and/or legal guardian's) consent, to reduce the patient's risk of exposure to COVID-19 and provide necessary medical care. Services were provided through a video synchronous discussion virtually to substitute for in-person clinic visit. Patient and provider were located at their individual homes.         Erica Marvin MD

## 2020-09-15 ENCOUNTER — VIRTUAL VISIT (OUTPATIENT)
Dept: FAMILY MEDICINE CLINIC | Age: 65
End: 2020-09-15
Payer: MEDICAID

## 2020-09-15 DIAGNOSIS — R21 RASH: Primary | ICD-10-CM

## 2020-09-15 PROCEDURE — 99213 OFFICE O/P EST LOW 20 MIN: CPT | Performed by: FAMILY MEDICINE

## 2020-09-15 RX ORDER — SULFAMETHOXAZOLE AND TRIMETHOPRIM 800; 160 MG/1; MG/1
1 TABLET ORAL 2 TIMES DAILY
Qty: 20 TAB | Refills: 0 | Status: SHIPPED | OUTPATIENT
Start: 2020-09-15 | End: 2020-09-25

## 2020-09-15 NOTE — PROGRESS NOTES
Patient is here today with complaints of finger infection. Last time she took cephalexin and had minimal improvement. The finger still red no discharge and no pain    Consent: Janeth Howell, who was seen by synchronous (real-time) audio-video technology, and/or her healthcare decision maker, is aware that this patient-initiated, Telehealth encounter on 9/15/2020 is a billable service, with coverage as determined by her insurance carrier. She is aware that she may receive a bill and has provided verbal consent to proceed: YES-Consent obtained within past 12 months        712  Subjective:   Janeth Howell is a 63 y.o. female who was seen for No chief complaint on file. Prior to Admission medications    Medication Sig Start Date End Date Taking? Authorizing Provider   trimethoprim-sulfamethoxazole (BACTRIM DS, SEPTRA DS) 160-800 mg per tablet Take 1 Tab by mouth two (2) times a day for 10 days. 9/15/20 9/25/20 Yes Sue Price MD   escitalopram oxalate (LEXAPRO) 20 mg tablet TAKE 1 TABLET BY MOUTH ONCE DAILY 8/13/20   Mica Rangel NP   traZODone (DESYREL) 50 mg tablet Take 1 to 2 tabs nightly as needed for insomnia 8/13/20   Heather QUINN NP   busPIRone (BUSPAR) 7.5 mg tablet Take 1 Tab by mouth two (2) times daily as needed (anxiety). 8/13/20   Mica Rangel NP   diclofenac (VOLTAREN) 1 % gel Apply  to affected area four (4) times daily. 5/21/20   Sue Price MD     No Known Allergies    ROS    Objective:   Vital Signs: (As obtained by patient/caregiver at home)  There were no vitals taken for this visit.      [INSTRUCTIONS:  \"[x]\" Indicates a positive item  \"[]\" Indicates a negative item  -- DELETE ALL ITEMS NOT EXAMINED]    Constitutional: [x] Appears well-developed and well-nourished [x] No apparent distress      [] Abnormal -     Mental status: [x] Alert and awake  [x] Oriented to person/place/time [x] Able to follow commands    [] Abnormal -     Eyes:   EOM    [x]  Normal    [] Abnormal - Sclera  [x]  Normal    [] Abnormal -          Discharge [x]  None visible   [] Abnormal -     HENT: [x] Normocephalic, atraumatic  [] Abnormal -   [x] Mouth/Throat: Mucous membranes are moist    External Ears [x] Normal  [] Abnormal -    Neck: [x] No visualized mass [] Abnormal -     Pulmonary/Chest: [x] Respiratory effort normal   [x] No visualized signs of difficulty breathing or respiratory distress        [] Abnormal -        Neurological:        [x] No Facial Asymmetry (Cranial nerve 7 motor function) (limited exam due to video visit)          [x] No gaze palsy        [] Abnormal -          Skin:        [x] No significant exanthematous lesions or discoloration noted on facial skin         [] Abnormal -            Psychiatric:       [x] Normal Affect [] Abnormal -        [x] No Hallucinations    Other pertinent observable physical exam findings:-              Assessment & Plan:   Diagnoses and all orders for this visit:    1. Rash  -     trimethoprim-sulfamethoxazole (BACTRIM DS, SEPTRA DS) 160-800 mg per tablet; Take 1 Tab by mouth two (2) times a day for 10 days.  -Patient was able to remove the acrylic nail during the office visit. No fungal infection seen under nail through virtual visit. Will retreat with Bactrim, recommend Epson soaks daily. I believe this will get better once acrylic nail has been removed. We discussed the expected course, resolution and complications of the diagnosis(es) in detail. Medication risks, benefits, costs, interactions, and alternatives were discussed as indicated. I advised her to contact the office if her condition worsens, changes or fails to improve as anticipated. She expressed understanding with the diagnosis(es) and plan. Evelyn Hopper is a 63 y.o. female being evaluated by a video visit encounter for concerns as above. A caregiver was present when appropriate.  Due to this being a TeleHealth encounter (During [facility]-49 public health emergency), evaluation of the following organ systems was limited: Vitals/Constitutional/EENT/Resp/CV/GI//MS/Neuro/Skin/Heme-Lymph-Imm. Pursuant to the emergency declaration under the [facility], [facility] waiver authority and the [employer] and [employer] Act, this Virtual  Visit was conducted, with patient's (and/or legal guardian's) consent, to reduce the patient's risk of exposure to COVID-19 and provide necessary medical care. Services were provided through a video synchronous discussion virtually to substitute for in-person clinic visit. Patient and provider were located at their individual homes.         Gavin Arguello MD

## 2020-10-07 ENCOUNTER — TELEPHONE (OUTPATIENT)
Dept: FAMILY MEDICINE CLINIC | Age: 65
End: 2020-10-07

## 2020-10-07 DIAGNOSIS — D64.9 ANEMIA, UNSPECIFIED TYPE: ICD-10-CM

## 2020-10-07 DIAGNOSIS — Z12.31 ENCOUNTER FOR SCREENING MAMMOGRAM FOR MALIGNANT NEOPLASM OF BREAST: Primary | ICD-10-CM

## 2020-10-07 DIAGNOSIS — E16.2 HYPOGLYCEMIA: ICD-10-CM

## 2020-10-07 DIAGNOSIS — Z12.31 ENCOUNTER FOR SCREENING MAMMOGRAM FOR MALIGNANT NEOPLASM OF BREAST: ICD-10-CM

## 2020-10-07 NOTE — TELEPHONE ENCOUNTER
Patient would like to have [facility] at [facility] for her labs.  Patient's phone [phone]

## 2020-10-07 NOTE — TELEPHONE ENCOUNTER
Patient would like an order for a mammogram. She would like to have this done at [facility].  Patient's phone: [phone]

## 2020-10-08 RX ORDER — CEPHALEXIN 500 MG/1
1000 CAPSULE ORAL 2 TIMES DAILY
Qty: 40 CAP | Refills: 0 | Status: SHIPPED | OUTPATIENT
Start: 2020-10-08 | End: 2020-10-18

## 2020-10-08 NOTE — TELEPHONE ENCOUNTER
Called and spoke to patient. Nita Walden)   Patient states understanding of Mammogram ordered and contact info for [employer]. Patient requesting OV for left index finger laceration/infection. Left index finger still enflamed/painful. Patient feels wound still infected after course of Bactrim completed as prescribed at [facility] 1237 9/15. Next available OV scheduled 10/23 with Frank Chambers NP to f/u on finger and labs needed. Requesting alternate RX to treat finger in the meantime while awaiting OV.

## 2020-10-20 ENCOUNTER — TRANSCRIBE ORDER (OUTPATIENT)
Dept: SCHEDULING | Age: 65
End: 2020-10-20

## 2020-10-20 DIAGNOSIS — M17.11 PRIMARY OSTEOARTHRITIS OF RIGHT KNEE: Primary | ICD-10-CM

## 2020-10-23 ENCOUNTER — OFFICE VISIT (OUTPATIENT)
Dept: FAMILY MEDICINE CLINIC | Age: 65
End: 2020-10-23
Payer: COMMERCIAL

## 2020-10-23 ENCOUNTER — HOSPITAL ENCOUNTER (OUTPATIENT)
Dept: MAMMOGRAPHY | Age: 65
Discharge: HOME | End: 2020-10-23
Attending: FAMILY MEDICINE
Payer: COMMERCIAL

## 2020-10-23 VITALS
HEIGHT: 61 IN | DIASTOLIC BLOOD PRESSURE: 74 MMHG | TEMPERATURE: 98.5 F | OXYGEN SATURATION: 97 % | SYSTOLIC BLOOD PRESSURE: 117 MMHG | HEART RATE: 62 BPM | BODY MASS INDEX: 27.06 KG/M2 | WEIGHT: 143.3 LBS

## 2020-10-23 DIAGNOSIS — E16.2 HYPOGLYCEMIA: Primary | ICD-10-CM

## 2020-10-23 DIAGNOSIS — R79.89 ABNORMAL TSH: ICD-10-CM

## 2020-10-23 DIAGNOSIS — M25.562 CHRONIC PAIN OF BOTH KNEES: ICD-10-CM

## 2020-10-23 DIAGNOSIS — D64.9 ANEMIA, UNSPECIFIED TYPE: ICD-10-CM

## 2020-10-23 DIAGNOSIS — F41.9 ANXIETY: ICD-10-CM

## 2020-10-23 DIAGNOSIS — S61.211S LACERATION OF LEFT INDEX FINGER WITHOUT FOREIGN BODY WITHOUT DAMAGE TO NAIL, SEQUELA: ICD-10-CM

## 2020-10-23 DIAGNOSIS — G89.29 CHRONIC PAIN OF BOTH KNEES: ICD-10-CM

## 2020-10-23 DIAGNOSIS — M25.561 CHRONIC PAIN OF BOTH KNEES: ICD-10-CM

## 2020-10-23 LAB
ALBUMIN SERPL-MCNC: 3.7 G/DL (ref 3.5–5)
ALBUMIN/GLOB SERPL: 1.5 {RATIO} (ref 1.1–2.2)
ALP SERPL-CCNC: 47 U/L (ref 45–117)
ALT SERPL-CCNC: 35 U/L (ref 12–78)
ANION GAP SERPL CALC-SCNC: 8 MMOL/L (ref 5–15)
AST SERPL-CCNC: 27 U/L (ref 15–37)
BASOPHILS # BLD: 0.1 K/UL (ref 0–0.1)
BASOPHILS NFR BLD: 2 % (ref 0–1)
BILIRUB SERPL-MCNC: 0.3 MG/DL (ref 0.2–1)
BUN SERPL-MCNC: 21 MG/DL (ref 6–20)
BUN/CREAT SERPL: 34 (ref 12–20)
CALCIUM SERPL-MCNC: 9.2 MG/DL (ref 8.5–10.1)
CHLORIDE SERPL-SCNC: 105 MMOL/L (ref 97–108)
CO2 SERPL-SCNC: 25 MMOL/L (ref 21–32)
CREAT SERPL-MCNC: 0.61 MG/DL (ref 0.55–1.02)
DIFFERENTIAL METHOD BLD: ABNORMAL
EOSINOPHIL # BLD: 0 K/UL (ref 0–0.4)
EOSINOPHIL NFR BLD: 1 % (ref 0–7)
ERYTHROCYTE [DISTWIDTH] IN BLOOD BY AUTOMATED COUNT: 16.7 % (ref 11.5–14.5)
EST. AVERAGE GLUCOSE BLD GHB EST-MCNC: 108 MG/DL
GLOBULIN SER CALC-MCNC: 2.5 G/DL (ref 2–4)
GLUCOSE SERPL-MCNC: 82 MG/DL (ref 65–100)
HBA1C MFR BLD: 5.4 % (ref 4–5.6)
HCT VFR BLD AUTO: 32.6 % (ref 35–47)
HGB BLD-MCNC: 10 G/DL (ref 11.5–16)
IMM GRANULOCYTES # BLD AUTO: 0 K/UL (ref 0–0.04)
IMM GRANULOCYTES NFR BLD AUTO: 0 % (ref 0–0.5)
LYMPHOCYTES # BLD: 1.2 K/UL (ref 0.8–3.5)
LYMPHOCYTES NFR BLD: 22 % (ref 12–49)
MCH RBC QN AUTO: 27.5 PG (ref 26–34)
MCHC RBC AUTO-ENTMCNC: 30.7 G/DL (ref 30–36.5)
MCV RBC AUTO: 89.8 FL (ref 80–99)
MONOCYTES # BLD: 0.4 K/UL (ref 0–1)
MONOCYTES NFR BLD: 7 % (ref 5–13)
NEUTS SEG # BLD: 3.6 K/UL (ref 1.8–8)
NEUTS SEG NFR BLD: 68 % (ref 32–75)
NRBC # BLD: 0 K/UL (ref 0–0.01)
NRBC BLD-RTO: 0 PER 100 WBC
PLATELET # BLD AUTO: 284 K/UL (ref 150–400)
PMV BLD AUTO: 12.4 FL (ref 8.9–12.9)
POTASSIUM SERPL-SCNC: 4.4 MMOL/L (ref 3.5–5.1)
PROT SERPL-MCNC: 6.2 G/DL (ref 6.4–8.2)
RBC # BLD AUTO: 3.63 M/UL (ref 3.8–5.2)
SODIUM SERPL-SCNC: 138 MMOL/L (ref 136–145)
TSH SERPL DL<=0.05 MIU/L-ACNC: 0.7 UIU/ML (ref 0.36–3.74)
WBC # BLD AUTO: 5.3 K/UL (ref 3.6–11)

## 2020-10-23 PROCEDURE — 77067 SCR MAMMO BI INCL CAD: CPT

## 2020-10-23 PROCEDURE — 99214 OFFICE O/P EST MOD 30 MIN: CPT | Performed by: NURSE PRACTITIONER

## 2020-10-23 RX ORDER — CEPHALEXIN 500 MG/1
500 CAPSULE ORAL 4 TIMES DAILY
Qty: 40 CAP | Refills: 0 | Status: CANCELLED | OUTPATIENT
Start: 2020-10-23 | End: 2020-11-02

## 2020-10-23 RX ORDER — DICLOFENAC SODIUM 10 MG/G
4 GEL TOPICAL 4 TIMES DAILY
Qty: 100 G | Refills: 5 | Status: SHIPPED | OUTPATIENT
Start: 2020-10-23 | End: 2020-11-18

## 2020-10-23 RX ORDER — BUSPIRONE HYDROCHLORIDE 7.5 MG/1
7.5 TABLET ORAL
Qty: 180 TAB | Refills: 3 | Status: SHIPPED | OUTPATIENT
Start: 2020-10-23 | End: 2021-10-26 | Stop reason: SDUPTHER

## 2020-10-23 NOTE — PROGRESS NOTES
Jair Vasquez is a 63 y.o. female , id x 2(name and ). Reviewed record, history, and  medications. Chief Complaint   Patient presents with    Finger Swelling     (L) index finger injury 1 month ago. Vitals:    10/23/20 1052   BP: 117/74   Pulse: 62   Temp: 98.5 °F (36.9 °C)   SpO2: 97%   Weight: 143 lb 4.8 oz (65 kg)   Height: 5' 1\" (1.549 m)   PainSc:   0 - No pain       Coordination of Care Questionnaire:   1) Have you been to an emergency room, urgent care, or hospitalized since your last visit?   no       2. Have seen or consulted any other health care provider since your last visit? NO      3 most recent PHQ Screens 9/15/2020   Little interest or pleasure in doing things Not at all   Feeling down, depressed, irritable, or hopeless Not at all   Total Score PHQ 2 0       Patient is accompanied by self I have received verbal consent from Jair Vasquez to discuss any/all medical information while they are present in the room.

## 2020-10-23 NOTE — PROGRESS NOTES
Chief Complaint   Patient presents with    Finger Swelling     (L) index finger injury 1 month ago. Pascual Zamora is a 63 y.o. female who presents for evaluation. Cut pointer finger on left hand in August, has f/u w/ Dr. Noy Hickman for this and completed 3 rounds of abx. Cephalexin X 10 days, bactrim X 10 days, then cephalexin X 10 days. Reports she was initially concerned d/t ongoing swelling but states \"it looks better today. \" Denies associated pain, increase in redness, purulent exudate, and excessive warmth of skin. Pt still struggling w/ \"passing out,\" when last discussed in August she felt it was d/t hypoglycemia, was not eating frequently   States she has been working to improve carbohydrate and fat intake and the sensation occurs less frequently but still happens on occasion, if she eats snack the sensation will resolve. Issue has been ongoing and intermittent s/p bariatric surgery   Abnormal TSH prior to surgery but resolved w/ weight loss, no longer on thyroid medication. Typically runs slightly anemic, last Hgb was 11.2, notes she has tried to donate blood recently and was denied d/t anemia    OA of knees, having right knee replacement in December, would like refill of topical diclofenac   Would also like refill of buspar, states it has helped anxiety tremendously     Reviewed PmHx, RxHx, FmHx, SocHx, AllgHx and updated and dated in the chart.     Patient Active Problem List    Diagnosis    Anemia    S/P bariatric surgery    Hyperglycemia    Cervical stenosis of spine    Vitamin D deficiency    Hypothyroid    Osteoarthritis    Asthma    Depression    HTN (hypertension), malignant       Review of Systems - negative except as listed above in the HPI    Objective:     Vitals:    10/23/20 1052   BP: 117/74   Pulse: 62   Temp: 98.5 °F (36.9 °C)   SpO2: 97%   Weight: 143 lb 4.8 oz (65 kg)   Height: 5' 1\" (1.549 m)     Physical Examination: General appearance - alert, well appearing, and in no distress  Mental status - alert, oriented to person, place, and time, normal mood, behavior, speech, dress, motor activity, and thought processes  Eyes - pupils equal and reactive, extraocular eye movements intact  Neck - supple, no significant adenopathy  Chest - clear to auscultation, no wheezes, rales or rhonchi, symmetric air entry  Heart - normal rate, regular rhythm, normal S1, S2, no murmurs, rubs, clicks or gallops  Extremities - peripheral pulses normal, no pedal edema, no clubbing or cyanosis  Skin - left pointer finger normal in appearance, small scab on lateral nail fold, no surrounding erythema, edema, not hot to touch, no visible purulence     Assessment/ Plan:   Diagnoses and all orders for this visit:    1. Hypoglycemia  -     METABOLIC PANEL, COMPREHENSIVE; Future  -     HEMOGLOBIN A1C WITH EAG; Future  - Encouraged continued efforts to eat frequent small meals throughout the day     2. Anemia, unspecified type  -     CBC WITH AUTOMATED DIFF; Future    3. Abnormal TSH  -     TSH 3RD GENERATION; Future    4. Laceration of left index finger without foreign body without damage to nail, sequela  - Discussed appears to be healing well, no s/sxs of infection on exam today. - Avoid nail polish and acrylic / press on nails as they harbor bacteria     5. Anxiety  -     busPIRone (BUSPAR) 7.5 mg tablet; Take 1 Tab by mouth two (2) times daily as needed (anxiety). - Stable on med, refilled    6. Chronic pain of both knees  -     diclofenac (VOLTAREN) 1 % gel; Apply 4 g to affected area four (4) times daily. - Refilled         Follow-up and Dispositions    · Return if symptoms worsen or fail to improve. I have discussed the diagnosis with the patient and the intended plan as seen in the above orders. The patient understands and agrees with the plan. The patient has received an after-visit summary and questions were answered concerning future plans.      Medication Side Effects and Warnings were discussed with patient  Patient Labs were reviewed and or requested:  Patient Past Records were reviewed and or requested    Milagros Cardona NP  [address] [facility]    There are no Patient Instructions on file for this visit.

## 2020-10-26 NOTE — PROGRESS NOTES
Please inform of labs as follows:   - Thyroid level is normal, not hyper or hypothyroid  - A1C is also normal at 5.4   - Complete blood count shows she is slightly anemic, a bit lower than last check, recommend starting daily iron supplement if not already taking. We can recheck 1-2 months.    - Metabolic panel shows blood sugar is normal and kidney and liver function are stable

## 2020-10-27 ENCOUNTER — TELEPHONE (OUTPATIENT)
Dept: FAMILY MEDICINE CLINIC | Age: 65
End: 2020-10-27

## 2020-10-27 NOTE — TELEPHONE ENCOUNTER
Patient called and verified. Results/ recommendations reviewed with patient. Verbalizes understanding. Orders need to be send in [facility] care by Dr. Sandra Garcia. Awaiting orders.

## 2020-10-27 NOTE — TELEPHONE ENCOUNTER
Pt called in and is wanting to know her mammo results please. Call back number for her is [phone]. Thanks.

## 2020-10-27 NOTE — PROGRESS NOTES
Patient aware, will get additional views at [facility]. Please put order in [facility].   RECOMMENDATIONS: Diagnostic right mammogram with spot magnification views and  possible ultrasound

## 2020-10-28 ENCOUNTER — TELEPHONE (OUTPATIENT)
Dept: FAMILY MEDICINE CLINIC | Age: 65
End: 2020-10-28

## 2020-10-28 DIAGNOSIS — R92.8 ABNORMAL MAMMOGRAM: Primary | ICD-10-CM

## 2020-10-28 DIAGNOSIS — R92.8 ABNORMAL MAMMOGRAM: ICD-10-CM

## 2020-10-28 NOTE — TELEPHONE ENCOUNTER
Central schedule called they need for you to change from dx mammo r breast with same day to no same day on the order

## 2020-10-28 NOTE — TELEPHONE ENCOUNTER
Called patient to let her know 2nd mammogram in computer and she should hear from someone with scheduling soon. Number to scheduling given to her so she can call and schedule. Will follow up with her tomorrow.

## 2020-10-28 NOTE — TELEPHONE ENCOUNTER
Attempted to call patient x 2. Phone disconnected first time, and answered but no voice the second time. Was going to inform patient that order for diagnostic us ordered.

## 2020-10-29 ENCOUNTER — DOCUMENTATION ONLY (OUTPATIENT)
Dept: FAMILY MEDICINE CLINIC | Age: 65
End: 2020-10-29

## 2020-11-03 ENCOUNTER — HOSPITAL ENCOUNTER (OUTPATIENT)
Dept: CT IMAGING | Age: 65
Discharge: HOME | End: 2020-11-03
Attending: ORTHOPAEDIC SURGERY
Payer: COMMERCIAL

## 2020-11-03 DIAGNOSIS — M17.11 PRIMARY OSTEOARTHRITIS OF RIGHT KNEE: ICD-10-CM

## 2020-11-03 PROCEDURE — 73700 CT LOWER EXTREMITY W/O DYE: CPT

## 2020-11-10 ENCOUNTER — HOSPITAL ENCOUNTER (OUTPATIENT)
Dept: MAMMOGRAPHY | Age: 65
Discharge: HOME | End: 2020-11-10
Attending: FAMILY MEDICINE
Payer: COMMERCIAL

## 2020-11-10 PROCEDURE — 77065 DX MAMMO INCL CAD UNI: CPT

## 2020-11-18 ENCOUNTER — HOSPITAL ENCOUNTER (OUTPATIENT)
Dept: PREADMISSION TESTING | Age: 65
Discharge: HOME | End: 2020-11-18
Payer: COMMERCIAL

## 2020-11-18 VITALS
RESPIRATION RATE: 16 BRPM | DIASTOLIC BLOOD PRESSURE: 64 MMHG | WEIGHT: 143.3 LBS | HEIGHT: 61 IN | SYSTOLIC BLOOD PRESSURE: 127 MMHG | BODY MASS INDEX: 27.06 KG/M2 | HEART RATE: 79 BPM | TEMPERATURE: 98.4 F | OXYGEN SATURATION: 99 %

## 2020-11-18 LAB
ALBUMIN SERPL-MCNC: 3.6 G/DL (ref 3.5–5)
ALBUMIN/GLOB SERPL: 1.3 {RATIO} (ref 1.1–2.2)
ALP SERPL-CCNC: 58 U/L (ref 45–117)
ALT SERPL-CCNC: 32 U/L (ref 12–78)
ANION GAP SERPL CALC-SCNC: 6 MMOL/L (ref 5–15)
APPEARANCE UR: CLEAR
AST SERPL-CCNC: 28 U/L (ref 15–37)
ATRIAL RATE: 87 BPM
BACTERIA URNS QL MICRO: NEGATIVE /HPF
BASOPHILS # BLD: 0.1 K/UL (ref 0–0.1)
BASOPHILS NFR BLD: 1 % (ref 0–1)
BILIRUB SERPL-MCNC: 0.3 MG/DL (ref 0.2–1)
BILIRUB UR QL: NEGATIVE
BUN SERPL-MCNC: 23 MG/DL (ref 6–20)
BUN/CREAT SERPL: 31 (ref 12–20)
CALCIUM SERPL-MCNC: 8.6 MG/DL (ref 8.5–10.1)
CALCULATED P AXIS, ECG09: 54 DEGREES
CALCULATED R AXIS, ECG10: 26 DEGREES
CALCULATED T AXIS, ECG11: 48 DEGREES
CHLORIDE SERPL-SCNC: 105 MMOL/L (ref 97–108)
CO2 SERPL-SCNC: 27 MMOL/L (ref 21–32)
COLOR UR: ABNORMAL
CREAT SERPL-MCNC: 0.74 MG/DL (ref 0.55–1.02)
CRP SERPL-MCNC: <0.29 MG/DL (ref 0–0.6)
DIAGNOSIS, 93000: NORMAL
DIFFERENTIAL METHOD BLD: ABNORMAL
EOSINOPHIL # BLD: 0.1 K/UL (ref 0–0.4)
EOSINOPHIL NFR BLD: 2 % (ref 0–7)
EPITH CASTS URNS QL MICRO: ABNORMAL /LPF
ERYTHROCYTE [DISTWIDTH] IN BLOOD BY AUTOMATED COUNT: 15.4 % (ref 11.5–14.5)
ERYTHROCYTE [SEDIMENTATION RATE] IN BLOOD: 11 MM/HR (ref 0–30)
GLOBULIN SER CALC-MCNC: 2.7 G/DL (ref 2–4)
GLUCOSE SERPL-MCNC: 179 MG/DL (ref 65–100)
GLUCOSE UR STRIP.AUTO-MCNC: NEGATIVE MG/DL
HCT VFR BLD AUTO: 32.8 % (ref 35–47)
HGB BLD-MCNC: 10.2 G/DL (ref 11.5–16)
HGB UR QL STRIP: NEGATIVE
HYALINE CASTS URNS QL MICRO: ABNORMAL /LPF (ref 0–5)
IMM GRANULOCYTES # BLD AUTO: 0 K/UL (ref 0–0.04)
IMM GRANULOCYTES NFR BLD AUTO: 0 % (ref 0–0.5)
KETONES UR QL STRIP.AUTO: NEGATIVE MG/DL
LEUKOCYTE ESTERASE UR QL STRIP.AUTO: ABNORMAL
LYMPHOCYTES # BLD: 1.5 K/UL (ref 0.8–3.5)
LYMPHOCYTES NFR BLD: 19 % (ref 12–49)
MCH RBC QN AUTO: 27.7 PG (ref 26–34)
MCHC RBC AUTO-ENTMCNC: 31.1 G/DL (ref 30–36.5)
MCV RBC AUTO: 89.1 FL (ref 80–99)
MONOCYTES # BLD: 0.3 K/UL (ref 0–1)
MONOCYTES NFR BLD: 4 % (ref 5–13)
NEUTS SEG # BLD: 5.9 K/UL (ref 1.8–8)
NEUTS SEG NFR BLD: 74 % (ref 32–75)
NITRITE UR QL STRIP.AUTO: NEGATIVE
NRBC # BLD: 0 K/UL (ref 0–0.01)
NRBC BLD-RTO: 0 PER 100 WBC
P-R INTERVAL, ECG05: 178 MS
PH UR STRIP: 5.5 [PH] (ref 5–8)
PLATELET # BLD AUTO: 285 K/UL (ref 150–400)
PMV BLD AUTO: 12.1 FL (ref 8.9–12.9)
POTASSIUM SERPL-SCNC: 4 MMOL/L (ref 3.5–5.1)
PROT SERPL-MCNC: 6.3 G/DL (ref 6.4–8.2)
PROT UR STRIP-MCNC: NEGATIVE MG/DL
Q-T INTERVAL, ECG07: 348 MS
QRS DURATION, ECG06: 84 MS
QTC CALCULATION (BEZET), ECG08: 418 MS
RBC # BLD AUTO: 3.68 M/UL (ref 3.8–5.2)
RBC #/AREA URNS HPF: ABNORMAL /HPF (ref 0–5)
SODIUM SERPL-SCNC: 138 MMOL/L (ref 136–145)
SP GR UR REFRACTOMETRY: 1.01 (ref 1–1.03)
UA: UC IF INDICATED,UAUC: ABNORMAL
UROBILINOGEN UR QL STRIP.AUTO: 0.2 EU/DL (ref 0.2–1)
VENTRICULAR RATE, ECG03: 87 BPM
WBC # BLD AUTO: 7.9 K/UL (ref 3.6–11)
WBC URNS QL MICRO: ABNORMAL /HPF (ref 0–4)

## 2020-11-18 PROCEDURE — 36415 COLL VENOUS BLD VENIPUNCTURE: CPT

## 2020-11-18 PROCEDURE — 81001 URINALYSIS AUTO W/SCOPE: CPT

## 2020-11-18 PROCEDURE — 86140 C-REACTIVE PROTEIN: CPT

## 2020-11-18 PROCEDURE — 93005 ELECTROCARDIOGRAM TRACING: CPT

## 2020-11-18 PROCEDURE — 80053 COMPREHEN METABOLIC PANEL: CPT

## 2020-11-18 PROCEDURE — 85025 COMPLETE CBC W/AUTO DIFF WBC: CPT

## 2020-11-18 PROCEDURE — 85652 RBC SED RATE AUTOMATED: CPT

## 2020-11-18 RX ORDER — TRAZODONE HYDROCHLORIDE 50 MG/1
100 TABLET ORAL
COMMUNITY
End: 2021-07-09 | Stop reason: SDUPTHER

## 2020-11-18 RX ORDER — ACETAMINOPHEN 500 MG
1000 TABLET ORAL
COMMUNITY
End: 2021-09-01

## 2020-11-18 RX ORDER — ACETAMINOPHEN 325 MG/1
650 TABLET ORAL
COMMUNITY
End: 2020-11-18

## 2020-11-18 NOTE — PROGRESS NOTES
Call 2nd floor to check on reg status for 9am appt. Pt has not signed in. Call [phone]. Mailbox full. Unable to leave message. Called MD office to verify #. # confirmed. Jacki Fiore notified. Eliza Webster NP notified no show for appt.

## 2020-11-18 NOTE — H&P
Preoperative Evaluation                     History and Physical with Surgical Risk Stratification     11/18/2020    CC: Right knee pain  Surgery: RTK     HPI:   Janey Ruiz is a 63 y.o. female referred for pre-operative evaluation by Dr. Kayleigh Fuentes for surgery on 12/3/20. Ms. Dallas Ferrari states she had a scope in 2014 and has had mild pain since then but this year the pain became worse. She has buckling and the pain interrupts her sleep. She had a fall last week because her knee gave out. The pain radiates from her knee to her hip. The pain is constant. 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 She has several concerns about surgery. She notes she gets sick and wants a scopolamine patch. She also notes that pain medications causes hallucinations. She only has her 16year old grand-daughter who lives with her for help after surgery. Her  had a massive stroke 2 years ago and he is going to respite while she is in hospital.     The patient was evaluated in the surgeon's office and it was determined that the most appropriate plan of care is to proceed with surgical intervention. Patient's PCP Drew Jauregui MD    Review of Systems     Constitutional: Negative for chills and fever  HENT: Negative for congestion and sore throat  Eyes: negative for blurred vision and double vision  Respiratory: Negative for cough, shortness of breath and wheezing  Mouth: Negative for loose, broken or chipped teeth. Cardiovascular: Negative for chest pain and palpitations  Gastrointestinal: Negative for abdominal pain, constipation, diarrhea and nausea  Genitourinary: Negative for dysuria and hematuria  Musculoskeletal: Knee pain  Skin: Negative for rash, open wounds. Negative for bruises easily  Neurological: Negative for dizziness, tremors and headaches  Psychiatric: Negative for depression. The patient is not nervous/anxious.     Inherent Risk of Surgery     Surgical risk: Intermediate  Low:  EIntermediate:  Joint Replacement, High:    Patient Cardiac Risk Assessment     Revised Cardiac Risk Index (RCRI)    Rate if cardiac death, nonfatal MI, nonfatal cardiac arrest by number of risk factor- 0.4%    RISHABH/AHA 2007 Guidelines:   1) Surgery Emergency, Non-cardiac -> to surgery  2) If not, look at clinical predictors    Major Intermediate Minor       Blood Thinner: NA    METS     >4 METS Climb a flight of stairs or a hill Walk on level ground at 4 mph Run a short distance Heavy work around house (scrub floors, move furniture)     Other Risk Factors:   Screening for ETOH use:  Done and low risk  Smoking status:  Former     Personal or FH of bleeding problems:  No  Personal or FH of blood clots:  No  Personal or FH of anesthesia problems:  Yes    Pulmonary Risk:  Asthma or COPD:  Yes  Body mass index is 27.08 kg/m². Known MOLLY:  No  Albumin normal, BUN normal    Past Medical, Surgical, Social History     Allergies: Allergies   Allergen Reactions    Nsaids (Non-Steroidal Anti-Inflammatory Drug) Other (comments)     Gastric bypass       Medication Documentation Review Audit     Reviewed by Scottie Oscar RN (Registered Nurse) on 11/18/20 at 1419    Medication Sig Documenting Provider Last Dose Status Taking?   acetaminophen (TylenoL) 325 mg tablet Take 650 mg by mouth every four (4) hours as needed for Pain. Provider, Historical  Active Yes   busPIRone (BUSPAR) 7.5 mg tablet Take 1 Tab by mouth two (2) times daily as needed (anxiety). Cheril Dakin, NP  Active Yes   escitalopram oxalate (LEXAPRO) 20 mg tablet TAKE 1 TABLET BY MOUTH ONCE DAILY Hernando QUINN NP  Active Yes   traZODone (DESYREL) 50 mg tablet Take 100 mg by mouth nightly.  Provider, Historical  Active Yes              Past Medical History:   Diagnosis Date    Adverse effect of other narcotics, sequela     Most pills cause hallucination and nausea    Anxiety     Asthma     Benign heart murmur     Difficult intubation 2012    Patient states she has a small mouth    Hypertension     resolved with weight loss    Hypoglycemia 10/2020    Hypothyroidism     Iron deficiency anemia     Osteoarthritis     PONV (postoperative nausea and vomiting)     Snoring     Syncopal episodes     Post Gastric bypass- food helps    Urinary incontinence      Past Surgical History:   Procedure Laterality Date    HX CATARACT REMOVAL Bilateral  & 2012    HX CERVICAL FUSION  2011    C2-7    HX  SECTION  X2    HX CHOLECYSTECTOMY  2018    HX GASTRIC BYPASS      HX HYSTERECTOMY  1992    HX KNEE ARTHROSCOPY Right 2008    HX TONSILLECTOMY      HX WISDOM TEETH EXTRACTION       Social History     Tobacco Use    Smoking status: Former Smoker     Packs/day: 0.25     Types: Cigarettes     Last attempt to quit: 2019     Years since quittin.8    Smokeless tobacco: Never Used   Substance Use Topics    Alcohol use: No     Alcohol/week: 0.0 standard drinks    Drug use: No     Family History   Problem Relation Age of Onset    Heart Disease Mother     Post-op Nausea/Vomiting Mother     Cancer Father         PROSTATE TO SPINE    Clotting Disorder Neg Hx        Objective     Vitals:    20 1402   BP: 127/64   Pulse: 79   Resp: 16   Temp: 98.4 °F (36.9 °C)   SpO2: 99%   Weight: 65 kg (143 lb 4.8 oz)   Height: 5' 1\" (1.549 m)       Constitutional:  Appears tired, pale, Vitals noted  Psychiatric:   Affect normal, Alert and Oriented to person/place/time    Eyes:   Pupils equally round and reactive, EOMI, conjunctiva clear, eyelids normal  ENT:   External ears and nose normal, teeth normal, gums normal, TMs and Orophyarynx normal  Neck:   General inspection and Thyroid normal.  No abnormal cervical or supraclavicular nodes    Lungs:   Clear to auscultation, good respiratory effort  Heart: Ausculation normal.  Regular rhythm. No cardiac murmurs.   No carotid bruits or palpable thrills  Chest wall normal  Musculoskeletal: Gait antalgic  Extremities:   Without edema, good peripheral pulses  Skin:   Warm to palpation, without rashes, bruising, or suspicious lesions     Recent Results (from the past 72 hour(s))   EKG, 12 LEAD, INITIAL    Collection Time: 11/18/20  1:32 PM   Result Value Ref Range    Ventricular Rate 87 BPM    Atrial Rate 87 BPM    P-R Interval 178 ms    QRS Duration 84 ms    Q-T Interval 348 ms    QTC Calculation (Bezet) 418 ms    Calculated P Axis 54 degrees    Calculated R Axis 26 degrees    Calculated T Axis 48 degrees    Diagnosis       Normal sinus rhythm  Normal ECG  When compared with ECG of 01-JUL-2013 16:00,  T wave amplitude has decreased in Anterior leads  Confirmed by Roni Mendez ([id number]) on 11/18/2020 3:17:59 PM     C REACTIVE PROTEIN, QT    Collection Time: 11/18/20  1:54 PM   Result Value Ref Range    C-Reactive protein <0.29 0.00 - 0.60 mg/dL   CBC WITH AUTOMATED DIFF    Collection Time: 11/18/20  1:54 PM   Result Value Ref Range    WBC 7.9 3.6 - 11.0 K/uL    RBC 3.68 (L) 3.80 - 5.20 M/uL    HGB 10.2 (L) 11.5 - 16.0 g/dL    HCT 32.8 (L) 35.0 - 47.0 %    MCV 89.1 80.0 - 99.0 FL    MCH 27.7 26.0 - 34.0 PG    MCHC 31.1 30.0 - 36.5 g/dL    RDW 15.4 (H) 11.5 - 14.5 %    PLATELET 109 914 - 830 K/uL    MPV 12.1 8.9 - 12.9 FL    NRBC 0.0 0  WBC    ABSOLUTE NRBC 0.00 0.00 - 0.01 K/uL    NEUTROPHILS 74 32 - 75 %    LYMPHOCYTES 19 12 - 49 %    MONOCYTES 4 (L) 5 - 13 %    EOSINOPHILS 2 0 - 7 %    BASOPHILS 1 0 - 1 %    IMMATURE GRANULOCYTES 0 0.0 - 0.5 %    ABS. NEUTROPHILS 5.9 1.8 - 8.0 K/UL    ABS. LYMPHOCYTES 1.5 0.8 - 3.5 K/UL    ABS. MONOCYTES 0.3 0.0 - 1.0 K/UL    ABS. EOSINOPHILS 0.1 0.0 - 0.4 K/UL    ABS. BASOPHILS 0.1 0.0 - 0.1 K/UL    ABS. IMM.  GRANS. 0.0 0.00 - 0.04 K/UL    DF AUTOMATED     METABOLIC PANEL, COMPREHENSIVE    Collection Time: 11/18/20  1:54 PM   Result Value Ref Range    Sodium 138 136 - 145 mmol/L    Potassium 4.0 3.5 - 5.1 mmol/L    Chloride 105 97 - 108 mmol/L    CO2 27 21 - 32 mmol/L    Anion gap 6 5 - 15 mmol/L    Glucose 179 (H) 65 - 100 mg/dL    BUN 23 (H) 6 - 20 MG/DL    Creatinine 0.74 0.55 - 1.02 MG/DL    BUN/Creatinine ratio 31 (H) 12 - 20      GFR est AA >60 >60 ml/min/1.73m2    GFR est non-AA >60 >60 ml/min/1.73m2    Calcium 8.6 8.5 - 10.1 MG/DL    Bilirubin, total 0.3 0.2 - 1.0 MG/DL    ALT (SGPT) 32 12 - 78 U/L    AST (SGOT) 28 15 - 37 U/L    Alk. phosphatase 58 45 - 117 U/L    Protein, total 6.3 (L) 6.4 - 8.2 g/dL    Albumin 3.6 3.5 - 5.0 g/dL    Globulin 2.7 2.0 - 4.0 g/dL    A-G Ratio 1.3 1.1 - 2.2     CULTURE, MRSA    Collection Time: 11/18/20  1:54 PM    Specimen: Nasal   Result Value Ref Range    Special Requests: NO SPECIAL REQUESTS      Culture result: MRSA NOT PRESENT AT 19 HOURS     SED RATE (ESR)    Collection Time: 11/18/20  1:54 PM   Result Value Ref Range    Sed rate, automated 11 0 - 30 mm/hr   URINALYSIS W/ REFLEX CULTURE    Collection Time: 11/18/20  1:54 PM    Specimen: Urine   Result Value Ref Range    Color YELLOW/STRAW      Appearance CLEAR CLEAR      Specific gravity 1.012 1.003 - 1.030      pH (UA) 5.5 5.0 - 8.0      Protein Negative NEG mg/dL    Glucose Negative NEG mg/dL    Ketone Negative NEG mg/dL    Bilirubin Negative NEG      Blood Negative NEG      Urobilinogen 0.2 0.2 - 1.0 EU/dL    Nitrites Negative NEG      Leukocyte Esterase TRACE (A) NEG      WBC 0-4 0 - 4 /hpf    RBC 0-5 0 - 5 /hpf    Epithelial cells FEW FEW /lpf    Bacteria Negative NEG /hpf    UA:UC IF INDICATED CULTURE NOT INDICATED BY UA RESULT CNI      Hyaline cast 0-2 0 - 5 /lpf       Assessment and Plan     Assessment/Plan:   1) OA Right Knee  2) Pre-Operative Evaluation    Labs and EKG reviewed. MRSA pending    CBC sent to surgeon. She has history of anemia. Preoperative Clearance  Per RCRI, the patient has a 0.4% risk of cardiac death, nonfatal MI, nonfatal cardiac arrest based on no risk factors.   Per ACC/AHA guidelines, patient is low risk for a(n) intermediate risk surgery and may proceed to planned surgery with the above noted risk.     Orhattie Corea, NP

## 2020-11-18 NOTE — PERIOP NOTES
[id number] [facility], [address] [facility]   MAIN OR                                  [phone]   MAIN PRE OP                          [phone]                                                                                AMBULATORY PRE OP          [phone]  PRE-ADMISSION TESTING    [phone]   Surgery Date:  Thursday 12/3/20       Is surgery arrival time given by surgeon? NO  If NO, [facility] staff will call you between 3 and 7pm the day before your surgery with your arrival time. (If your surgery is on a Monday, we will call you the Friday before.)    Call [phone] after 7pm Monday-Friday if you did not receive this call. INSTRUCTIONS BEFORE YOUR SURGERY   When You  Arrive Arrive at the [address] [facility] on the day of your surgery  Have your insurance card, photo ID, and any copayment (if needed)   Food   and   Drink NO food or drink after midnight the night before surgery    This means NO water, gum, mints, coffee, juice, etc.  No alcohol (beer, wine, liquor) 24 hours before and after surgery   Medications to   TAKE   Morning of Surgery MEDICATIONS TO TAKE THE MORNING OF SURGERY WITH A SIP OF WATER:    buspar if needed   Medications  To  STOP      7 days before surgery  Non-Steroidal anti-inflammatory Drugs (NSAID's): for example, Ibuprofen (Advil, Motrin), Naproxen (Aleve)   Aspirin, if taking for pain    Herbal supplements, vitamins, and fish oil   Other:  (Pain medications not listed above, including Tylenol may be taken)   Blood  Thinners     Bathing Clothing  Jewelry  Valuables      If you shower the morning of surgery, please do not apply anything to your skin (lotions, powders, deodorant, or makeup, especially mascara)   Follow Chlorhexidine Care Fusion body wash instructions provided to you during PAT appointment. Begin 3 days prior to surgery.    Do not shave or trim anywhere 24 hours before surgery   Wear your hair loose or down; no pony-tails, buns, or metal hair clips   Wear loose, comfortable, clean clothes   Wear glasses instead of contacts   Leave money, valuables, and jewelry, including body piercings, at home   Going Home - or Spending the Night  SAME-DAY SURGERY: You must have a responsible adult drive you home and stay with you 24 hours after surgery   ADMITS: If your doctor is keeping you in the hospital after surgery, leave personal belongings/luggage in your car until you have a hospital room number. Hospital discharge time is 12 noon  Drivers must be here before 12 noon unless you are told differently   Special Instructions It is now mandated that all surgical patients be tested for COVID-19 prior to surgery. Testing has to be exactly 4 days prior to surgery. Your COVID test date is Sunday 11/29/20 between 8:00 am and 11:00 am.       COVID testing will be performed curbside at the [address] [place] [address]. There will be signs leading you to the testing site. You will need to bring a photo ID with you to be swabbed. Patients are advised to self-quarantine at home after testing and prior to your surgery date. You will be notified if your results are positive. What to watch for:   Coronavirus (COVID-19) affects different people in different ways   It also appears with a wide range of symptoms from mild to severe   Signs usually appear 2-14 days after exposure     If you develop any of the following, notify your doctor immediately:  o Fever  o Chills, with or without a shiver  o Muscle pain  o Headache  o Sore throat  o Dry cough  o New loss of taste or smell  o Tiredness      If you develop any of the following, call 911:  o Shortness of breath  o Difficulty breathing  o Chest pain  o New confusion  o Blueness of fingers and/or lips    16.   Special Instructions:  · Use Chlorhexidine Care Fusion wash and sponges 3 days prior to surgery as instructed. · Incentive spirometer given with instructions to practice at home and bring back to the hospital on the day of surgery. · [facility] will contact you if your Hemoglobin A1C is greater than 7.5. · Ensure/Glucerna  sample  and Ensure/Glucerna coupon given. · Pain pamphlet and Call Don't Fall reminder reviewed with patient. ·   · Bring PTA Medication list day of surgery with the last doses taken documented   · Do not bring medication bottles the day of surgery     Follow all instructions so your surgery wont be cancelled. Please, be on time. If a situation occurs and you are delayed the day of surgery, call [phone] or [phone]. If your physical condition changes (like a fever, cold, flu, etc.) call your surgeon. Home medication(s) reviewed and verified via   BOTTLES/VERBAL   during PAT appointment. The patient was contacted by     IN-PERSON  The patient verbalizes understanding of all instructions and   DOES NOT   need reinforcement.

## 2020-11-19 LAB
BACTERIA SPEC CULT: NORMAL
BACTERIA SPEC CULT: NORMAL
SERVICE CMNT-IMP: NORMAL

## 2020-11-29 ENCOUNTER — HOSPITAL ENCOUNTER (OUTPATIENT)
Dept: PREADMISSION TESTING | Age: 65
Discharge: HOME | End: 2020-11-29
Payer: COMMERCIAL

## 2020-11-29 PROCEDURE — 87635 SARS-COV-2 COVID-19 AMP PRB: CPT

## 2020-11-30 LAB — SARS-COV-2, COV2NT: NOT DETECTED

## 2020-12-02 ENCOUNTER — ANESTHESIA EVENT (OUTPATIENT)
Dept: SURGERY | Age: 65
DRG: 302 | End: 2020-12-02
Payer: COMMERCIAL

## 2020-12-02 NOTE — DISCHARGE INSTRUCTIONS
TOTAL KNEE DISCHARGE INSTRUCTIONS      Patient: Wilfrid Pinedo MRN: [id number]  SSN: [id number]              Please take the time to review the following instructions before you leave the hospital and use them as guidelines during your recovery from surgery. If you have any questions you may contact my office at [phone]  After business hours or during the weekend you can contact me through [address] or text / call at [phone] (cell phone) for emergency's. Please use the office number during regular business hours. SPECIAL INSTRUCTIONS :   1. Full extension at the knee is the most important aspect of your range of motion. Avoid placing a pillow or bump behind the knee. Rather, place the heel up on a bump or pillow and allow gravity to help straighten the knee. 2. You may weight bear as tolerated on the knee and during the day you should bend the knee as much as possible. 3. Drainage from the incision more than 4 days from surgery is concerning. Contact my office if there is any question [phone].   4. You may contact me directly through [employer] if there are specific questions or text / call using my cell number [phone]. DRESSING :     Post-op Dressings : This should be removed by physical therapy or you may remove this yourself 7 days after the date of your surgery. If there is no drainage, then a simple dressing may be used or no dressing at all. Other dressing options can be purchased over the counter at a local pharmacy or medical supply vendor. A porous adhesive dressing such as pictured above can be purchased online ([employer]) or at your local [facility]. You only need to keep the incision covered for 7 days after showers. A dressing may be used for longer if there are issues with clothing clinging to the incision. Showering/ Bathing: You may shower with the Post-op dressing in place.  This is left in place for 7 days following discharge from the hospital. If your incision is dry without drainage you may shower following your discharge home. After 7 days your dressing should be removed for showering. It is fine to have water run over the incision. Do not vigorously scrub your incision. Apply a clean, dry dressing after you have dried your incision. Do not take a bath or get into a swimming pool / [place]irt until you follow up with Dr. Luma Cooley. Do not soak your incision under water. If there is continued drainage or you are concerned contact Dr Narayan Dill office prior to showering [phone] ext [phone] . Diet:  You may advance to your regular diet as tolerated. Increase your clear liquid intake for the next 2-3 days. Medication:      1. You will be given prescriptions for pain medication when you are discharged from the hospital. The side effects of these medications can be substantial and the narcotic medications are not mandatory. You may substitute these medications with Tylenol or Alleve / Motrin. 2. Please use the medications as prescribed. Pain medications may cause constipation- Colace twice daily and Miralax one scoop daily while taking the narcotic medication should help prevent constipation. Please discuss with your local pharmacist regarding increasing this dosage if constipation persists. Other possible side effects of pain medication are dizziness, headache, nausea, vomiting, and urinary retention. Discontinue the pain medication if you develop itching, rash, shortness of breath, or difficulties swallowing. If these symptoms become severe or are not relieved by discontinuing the medication, you should seek immediate medical attention. 3. Refills of pain medication are authorized during office hours only (8 AM- 5 PM  Monday thru Friday). Many of these medication will require you or a family member to pick-up a physical prescription at the office.    4. Medications other than antiinflammatories will not be called into the pharmacy after business hours. 5. You may resume the medication(s) you were taking prior to your surgery. Narcotics may change the effects of some antidepressant medication(s). If you have any questions about possible interactions between your regular medications and the pain medication, you should ask the pharmacist or contact the prescribing physician. 6. If you have constipation which is not improved by oral stool softeners then a Ducolax suppository should be purchased over the counter. 7. Continue the blood thinner (Aspirin or Lovenox) for a total of 30 days following surgery. Follow up appointment:    Please call our office at [phone] for your follow up appointment. This should be scheduled 14 days following the date of surgery. You should also have a scheduled appointment for physical therapy following surgery. Physical Therapy / Nursing:    Physical Therapy following surgery will be arranged as an outpatient or at home. They have specific instructions for rehab and wound care. It is fine to have physical therapy remove your dressing at 7 days following surgery. Returning to work:    Normal return to work is 6-12 weeks following surgery. Depending on your progression following surgery and specific job duties you may take longer for a full return to work. DRIVING    You should not return to driving until you are off all opioid pain medications and able to safely and quickly apply the brakes. This is normally 2-6 weeks for left sided surgery and 2-8 weeks for right sided surgery. Important Signs and Symptoms:    If any of the following signs or symptoms occur, you should contact Dr. Piero Daniels office.   Please be advised if a problem arises which you feel requires immediate medical attention or you are unable to contact Dr. Piero Daniels office you should seek immediate medical attention at the ER or other health care facility you have access to.    1. A sudden increase in swelling and/or redness or warmth at the area your surgery was performed which isnt relieved by rest, ice, and elevation. 2. Oral temperature greater than 101 degrees for 12 hours or more which isnt relieved by an increase in fluid intake and taking 2 Tylenol every 4-6 hours. 3. Excessive drainage from your incisions, or drainage which hasnt stopped by 72 hours after your surgery. 4. Fever, chills, shortness of breath, chest pain, nausea, vomiting or other signs and symptoms which are of concern to you. YOUR TOTAL JOINT REPLACEMENT  FREQUENTLY ASKED QUESTIONS   What should I take for pain?  o You will be discharged with four medications for pain (Oxycodone, Tramadol, Ibuprofen and Tylenol). These may vary slightly depending on what you were taking in the hospital.   - 1st Line - Tylenol Arthritis Strength - 325-650 mg every 4 hours (scheduled for the 1st 24 hours)  - 2nd Line -  Ibuprofen 400 (2 tabs) - 800 (4 tabs) mg every 8 hours  (scheduled for the 1st 24 hours)  - 3rd Line - Oxycodone 5 mg (1-2 tablets every 4-6 hrs)  - 4th Line - Tramadol 50 mg (1-2 tablets every 4-6 hours) - take these between Oxycodone doses if your pain is not alleviated.  When should I call for advice regarding my pain?  o If your pain is still uncontrolled after being on the regimen above for at least 12 hours, please call the office [phone] or text / call my cell after hours [phone].  Can I get refills?  o Opioid refills are provided for the first 2-6 weeks following surgery. o Use Tylenol 500 mg along with Aleve 220mg twice daily or Motrin 200-800mg every 4-6 hours during the daytime hours after two weeks. - After two weeks, I suggest the opioid pain medications be used only 1 hour prior to your physical therapy appointment and 1 hour before sleeping at night. Use Tylenol and Ibuprofen at other times during the day.    - Keep in mind that you will need to discontinue opioids before you resume driving.  Is swelling normal?  o Almost everyone has some degree of swelling following surgery. o Following hip and knee replacement surgery, swelling can be normal below the incision for the first few weeks. - This swelling peaks around 5-7 days after surgery. - It is not unusual to have some bruising about the back of the thigh, calf, ankle, and foot.  What should I do for the swelling?  o Keep the limb elevated above the level of your heart - 'Toes above Nose'. o Apply compression socks (knee high for total knees and up to the mid-thigh for total hips). o Use the ice packs that you are discharged home with several times a day for the first several weeks.  How long should I remain on blood thinners following surgery? o 30 days   Which blood thinners will I be on? Can I take them with Tylenol?  o  Aspirin 81 mg twice daily - these should be taken with meals and can be used with Tylenol. In certain instances, you may be sent home on Lovenox for 30 days. o For short periods of time (30 days), aspirin and anti-inflammatories (i.e. Aleve, Motrin / Advil / ibuprofen, diclofenac, etc. can be taken together).  When can I drive?  o Once you have stopped using regular narcotic pain medications (Oxycodone, Percocet, Lortab, Norco etc.) and can safely apply the brakes without hesitation, (emergency braking).  When can I shower?  o You may shower immediately if your Optifoam bandage is dry and without discharge. The Optifoam dressing should be removed 7 days following surgery, after which you may continue to shower.  o No submersion of the incision, bathing or swimming for 14 days following surgery or until cleared by Dr Juan M Mccormick.  Can I remove this dressing?  o Yes, this is removed just like removing a band aid.  o If you are concerned, this can be removed by your therapist.   [facility] What do I do with the dressing when I shower?  o The Optifoam dressing is waterproof and you may shower with it. o The incision is sealed with Dermabond, a biologic skin glue, which also serves as a watertight seal. If your incision is draining, it is no longer considered to be watertight - you should contact our office prior to showering if you experience any drainage.  Which dressing should I purchase after I remove my Optifoam?  o An occlusive dressing which covers your entire incision. This does not have to be waterproof, but will need to be removed when you shower and then replaced. (Example Only)   How active should I be following surgery? o Progress activities in moderation and at your own pace.   o Walking room to room in your house is encouraged. o Walk each day and set progressive goals with small increments (1st week - ?block of walking, 2nd week - 1 block, 3rd week - 2 blocks, etc.)   Will I need help at home?  o You will likely need a caretaker who should be available for the first week following surgery. It is fine for family members to work during the day, as long as they are available by phone. o Planning ahead makes coming home from the hospital a much easier transition.  How long will my surgery take?  o On average, total joint replacement takes approximately 1-2 hour.   o The entire process, including pre-op and post-op care can last as long as 4- 5 hours before you are transferred to your room. o stroke, pulmonary embolism (a clot going from the legs to the lungs), and even death with surgery.  Will I be given antibiotics? Will I need antibiotics at discharge?  o Antibiotics will be given to you both before and after your procedure. To further minimize the risk of infection, we have streamlined the surgical procedure to take less time in the operating room.    o You do not require antibiotics following surgery.       Please do not hesitate to contact me through [employer] or by text / call me at [phone] (cell phone) for questions following surgery - MD Queenie Yap, MD  Cell [phone]  Jaren Meyers PA-C  Cell [phone]  Medical Assistants: Enrique Roman [phone]

## 2020-12-03 ENCOUNTER — HOSPITAL ENCOUNTER (INPATIENT)
Age: 65
LOS: 4 days | Discharge: HOME | DRG: 302 | End: 2020-12-07
Attending: ORTHOPAEDIC SURGERY | Admitting: ORTHOPAEDIC SURGERY
Payer: COMMERCIAL

## 2020-12-03 ENCOUNTER — APPOINTMENT (OUTPATIENT)
Dept: GENERAL RADIOLOGY | Age: 65
DRG: 302 | End: 2020-12-03
Attending: PHYSICIAN ASSISTANT
Payer: COMMERCIAL

## 2020-12-03 ENCOUNTER — ANESTHESIA (OUTPATIENT)
Dept: SURGERY | Age: 65
DRG: 302 | End: 2020-12-03
Payer: COMMERCIAL

## 2020-12-03 DIAGNOSIS — I10 HTN (HYPERTENSION), MALIGNANT: ICD-10-CM

## 2020-12-03 DIAGNOSIS — R41.0 CONFUSION: ICD-10-CM

## 2020-12-03 DIAGNOSIS — M17.11 PRIMARY OSTEOARTHRITIS OF RIGHT KNEE: Primary | ICD-10-CM

## 2020-12-03 DIAGNOSIS — E03.1 CONGENITAL HYPOTHYROIDISM WITHOUT GOITER: ICD-10-CM

## 2020-12-03 DIAGNOSIS — D64.9 ANEMIA, UNSPECIFIED TYPE: ICD-10-CM

## 2020-12-03 LAB
GLUCOSE BLD STRIP.AUTO-MCNC: 104 MG/DL (ref 65–100)
SERVICE CMNT-IMP: ABNORMAL

## 2020-12-03 PROCEDURE — 77030006835 HC BLD SAW SAG STRY -B: Performed by: ORTHOPAEDIC SURGERY

## 2020-12-03 PROCEDURE — 77030007866 HC KT SPN ANES BBMI -B

## 2020-12-03 PROCEDURE — 77030040361 HC SLV COMPR DVT MDII -B

## 2020-12-03 PROCEDURE — 74011250636 HC RX REV CODE- 250/636

## 2020-12-03 PROCEDURE — 74011000258 HC RX REV CODE- 258: Performed by: NURSE ANESTHETIST, CERTIFIED REGISTERED

## 2020-12-03 PROCEDURE — 77030028907 HC WRP KNEE WO BGS SOLM -B

## 2020-12-03 PROCEDURE — 76060000064 HC AMB SURG ANES 2 TO 2.5 HR: Performed by: ORTHOPAEDIC SURGERY

## 2020-12-03 PROCEDURE — 74011000250 HC RX REV CODE- 250: Performed by: PHYSICIAN ASSISTANT

## 2020-12-03 PROCEDURE — 77030038149 HC BLD SAW SAG STRY -D: Performed by: ORTHOPAEDIC SURGERY

## 2020-12-03 PROCEDURE — 76030000021 HC AMB SURG 2 TO 2.5 HR INTENSV-TIER 1: Performed by: ORTHOPAEDIC SURGERY

## 2020-12-03 PROCEDURE — 77030029828 HC FEM TIB CKPNT KT DISP STRY -B: Performed by: ORTHOPAEDIC SURGERY

## 2020-12-03 PROCEDURE — C1776 JOINT DEVICE (IMPLANTABLE): HCPCS | Performed by: ORTHOPAEDIC SURGERY

## 2020-12-03 PROCEDURE — 74011250637 HC RX REV CODE- 250/637: Performed by: PHYSICIAN ASSISTANT

## 2020-12-03 PROCEDURE — 77030018673: Performed by: ORTHOPAEDIC SURGERY

## 2020-12-03 PROCEDURE — 74011250636 HC RX REV CODE- 250/636: Performed by: NURSE ANESTHETIST, CERTIFIED REGISTERED

## 2020-12-03 PROCEDURE — 74011250636 HC RX REV CODE- 250/636: Performed by: STUDENT IN AN ORGANIZED HEALTH CARE EDUCATION/TRAINING PROGRAM

## 2020-12-03 PROCEDURE — 77030018836 HC SOL IRR NACL ICUM -A: Performed by: ORTHOPAEDIC SURGERY

## 2020-12-03 PROCEDURE — 74011250637 HC RX REV CODE- 250/637: Performed by: STUDENT IN AN ORGANIZED HEALTH CARE EDUCATION/TRAINING PROGRAM

## 2020-12-03 PROCEDURE — 77030003601 HC NDL NRV BLK BBMI -A

## 2020-12-03 PROCEDURE — 77030010507 HC ADH SKN DERMBND J&J -B: Performed by: ORTHOPAEDIC SURGERY

## 2020-12-03 PROCEDURE — 64447 NJX AA&/STRD FEMORAL NRV IMG: CPT

## 2020-12-03 PROCEDURE — 0SRC0JZ REPLACEMENT OF RIGHT KNEE JOINT WITH SYNTHETIC SUBSTITUTE, OPEN APPROACH: ICD-10-PCS | Performed by: ORTHOPAEDIC SURGERY

## 2020-12-03 PROCEDURE — 77030000032 HC CUF TRNQT ZIMM -B: Performed by: ORTHOPAEDIC SURGERY

## 2020-12-03 PROCEDURE — 82962 GLUCOSE BLOOD TEST: CPT

## 2020-12-03 PROCEDURE — 99218 HC RM OBSERVATION: CPT

## 2020-12-03 PROCEDURE — 74011000250 HC RX REV CODE- 250: Performed by: NURSE ANESTHETIST, CERTIFIED REGISTERED

## 2020-12-03 PROCEDURE — 74011250636 HC RX REV CODE- 250/636: Performed by: PHYSICIAN ASSISTANT

## 2020-12-03 PROCEDURE — 65270000029 HC RM PRIVATE

## 2020-12-03 PROCEDURE — 8E0Y0CZ ROBOTIC ASSISTED PROCEDURE OF LOWER EXTREMITY, OPEN APPROACH: ICD-10-PCS | Performed by: ORTHOPAEDIC SURGERY

## 2020-12-03 PROCEDURE — 77030002933 HC SUT MCRYL J&J -A: Performed by: ORTHOPAEDIC SURGERY

## 2020-12-03 PROCEDURE — 77030041680 HC PNCL ELECSURG SMK EVAC CNMD -B: Performed by: ORTHOPAEDIC SURGERY

## 2020-12-03 PROCEDURE — 77030018723 HC ELCTRD BLD COVD -A: Performed by: ORTHOPAEDIC SURGERY

## 2020-12-03 PROCEDURE — 74011250637 HC RX REV CODE- 250/637: Performed by: ORTHOPAEDIC SURGERY

## 2020-12-03 PROCEDURE — 77030031139 HC SUT VCRL2 J&J -A: Performed by: ORTHOPAEDIC SURGERY

## 2020-12-03 PROCEDURE — 97161 PT EVAL LOW COMPLEX 20 MIN: CPT

## 2020-12-03 PROCEDURE — 77030035236 HC SUT PDS STRATFX BARB J&J -B: Performed by: ORTHOPAEDIC SURGERY

## 2020-12-03 PROCEDURE — 2709999900 HC NON-CHARGEABLE SUPPLY: Performed by: ORTHOPAEDIC SURGERY

## 2020-12-03 PROCEDURE — 64445 NJX AA&/STRD SCIATIC NRV IMG: CPT

## 2020-12-03 PROCEDURE — 73560 X-RAY EXAM OF KNEE 1 OR 2: CPT

## 2020-12-03 PROCEDURE — 97116 GAIT TRAINING THERAPY: CPT

## 2020-12-03 PROCEDURE — 77030029820: Performed by: ORTHOPAEDIC SURGERY

## 2020-12-03 PROCEDURE — 76210000036 HC AMBSU PH I REC 1.5 TO 2 HR: Performed by: ORTHOPAEDIC SURGERY

## 2020-12-03 PROCEDURE — 77030040922 HC BLNKT HYPOTHRM STRY -A

## 2020-12-03 PROCEDURE — 77030020263 HC SOL INJ SOD CL0.9% LFCR 1000ML: Performed by: ORTHOPAEDIC SURGERY

## 2020-12-03 PROCEDURE — 74011000250 HC RX REV CODE- 250

## 2020-12-03 DEVICE — KNEE K2 TOT HEMI ADV CMTLS -- IMPL CAPPED K2: Type: IMPLANTABLE DEVICE | Status: FUNCTIONAL

## 2020-12-03 DEVICE — TIBIAL COMPONENT
Type: IMPLANTABLE DEVICE | Site: KNEE | Status: FUNCTIONAL
Brand: TRIATHLON

## 2020-12-03 DEVICE — PATELLA
Type: IMPLANTABLE DEVICE | Site: KNEE | Status: FUNCTIONAL
Brand: TRIATHLON

## 2020-12-03 DEVICE — TIBIAL BEARING INSERT - CR
Type: IMPLANTABLE DEVICE | Site: KNEE | Status: FUNCTIONAL
Brand: TRIATHLON

## 2020-12-03 DEVICE — CRUCIATE RETAINING FEMORAL
Type: IMPLANTABLE DEVICE | Site: KNEE | Status: FUNCTIONAL
Brand: TRIATHLON

## 2020-12-03 RX ORDER — OXYCODONE HYDROCHLORIDE 5 MG/1
10 TABLET ORAL
Status: DISCONTINUED | OUTPATIENT
Start: 2020-12-03 | End: 2020-12-04

## 2020-12-03 RX ORDER — ONDANSETRON 8 MG/1
4 TABLET, ORALLY DISINTEGRATING ORAL
Qty: 30 TAB | Refills: 0 | Status: SHIPPED | OUTPATIENT
Start: 2020-12-03 | End: 2021-04-14

## 2020-12-03 RX ORDER — DIPHENHYDRAMINE HYDROCHLORIDE 50 MG/ML
12.5 INJECTION, SOLUTION INTRAMUSCULAR; INTRAVENOUS
Status: ACTIVE | OUTPATIENT
Start: 2020-12-03 | End: 2020-12-04

## 2020-12-03 RX ORDER — FAMOTIDINE 20 MG/1
20 TABLET, FILM COATED ORAL 2 TIMES DAILY
Status: DISCONTINUED | OUTPATIENT
Start: 2020-12-03 | End: 2020-12-07 | Stop reason: HOSPADM

## 2020-12-03 RX ORDER — BUPIVACAINE HYDROCHLORIDE 5 MG/ML
INJECTION, SOLUTION EPIDURAL; INTRACAUDAL AS NEEDED
Status: DISCONTINUED | OUTPATIENT
Start: 2020-12-03 | End: 2020-12-03 | Stop reason: HOSPADM

## 2020-12-03 RX ORDER — ACETAMINOPHEN 500 MG
975 TABLET ORAL
Qty: 60 TAB | Refills: 1 | Status: SHIPPED | OUTPATIENT
Start: 2020-12-03 | End: 2022-09-15

## 2020-12-03 RX ORDER — PROPOFOL 10 MG/ML
INJECTION, EMULSION INTRAVENOUS
Status: DISCONTINUED | OUTPATIENT
Start: 2020-12-03 | End: 2020-12-03 | Stop reason: HOSPADM

## 2020-12-03 RX ORDER — OXYCODONE HYDROCHLORIDE 5 MG/1
5 TABLET ORAL
Status: DISCONTINUED | OUTPATIENT
Start: 2020-12-03 | End: 2020-12-04

## 2020-12-03 RX ORDER — SODIUM CHLORIDE, SODIUM LACTATE, POTASSIUM CHLORIDE, CALCIUM CHLORIDE 600; 310; 30; 20 MG/100ML; MG/100ML; MG/100ML; MG/100ML
125 INJECTION, SOLUTION INTRAVENOUS CONTINUOUS
Status: DISCONTINUED | OUTPATIENT
Start: 2020-12-03 | End: 2020-12-03 | Stop reason: HOSPADM

## 2020-12-03 RX ORDER — DEXAMETHASONE SODIUM PHOSPHATE 4 MG/ML
INJECTION, SOLUTION INTRA-ARTICULAR; INTRALESIONAL; INTRAMUSCULAR; INTRAVENOUS; SOFT TISSUE AS NEEDED
Status: DISCONTINUED | OUTPATIENT
Start: 2020-12-03 | End: 2020-12-03 | Stop reason: HOSPADM

## 2020-12-03 RX ORDER — ASPIRIN 81 MG/1
81 TABLET ORAL 2 TIMES DAILY
Status: DISCONTINUED | OUTPATIENT
Start: 2020-12-03 | End: 2020-12-07 | Stop reason: HOSPADM

## 2020-12-03 RX ORDER — LIDOCAINE HYDROCHLORIDE 10 MG/ML
0.1 INJECTION, SOLUTION EPIDURAL; INFILTRATION; INTRACAUDAL; PERINEURAL AS NEEDED
Status: DISCONTINUED | OUTPATIENT
Start: 2020-12-03 | End: 2020-12-03 | Stop reason: HOSPADM

## 2020-12-03 RX ORDER — EPHEDRINE SULFATE/0.9% NACL/PF 50 MG/5 ML
SYRINGE (ML) INTRAVENOUS AS NEEDED
Status: DISCONTINUED | OUTPATIENT
Start: 2020-12-03 | End: 2020-12-03 | Stop reason: HOSPADM

## 2020-12-03 RX ORDER — POLYETHYLENE GLYCOL 3350 17 G/17G
17 POWDER, FOR SOLUTION ORAL DAILY
Status: DISCONTINUED | OUTPATIENT
Start: 2020-12-04 | End: 2020-12-07 | Stop reason: HOSPADM

## 2020-12-03 RX ORDER — PROPOFOL 10 MG/ML
INJECTION, EMULSION INTRAVENOUS AS NEEDED
Status: DISCONTINUED | OUTPATIENT
Start: 2020-12-03 | End: 2020-12-03 | Stop reason: HOSPADM

## 2020-12-03 RX ORDER — ROPIVACAINE HYDROCHLORIDE 2 MG/ML
INJECTION, SOLUTION EPIDURAL; INFILTRATION; PERINEURAL AS NEEDED
Status: DISCONTINUED | OUTPATIENT
Start: 2020-12-03 | End: 2020-12-03

## 2020-12-03 RX ORDER — IBUPROFEN 800 MG/1
800 TABLET ORAL
Qty: 50 TAB | Refills: 2 | Status: SHIPPED | OUTPATIENT
Start: 2020-12-03 | End: 2021-04-14

## 2020-12-03 RX ORDER — PREGABALIN 75 MG/1
75 CAPSULE ORAL DAILY
Status: DISCONTINUED | OUTPATIENT
Start: 2020-12-04 | End: 2020-12-04

## 2020-12-03 RX ORDER — OXYCODONE HCL 10 MG/1
10 TABLET, FILM COATED, EXTENDED RELEASE ORAL ONCE
Status: COMPLETED | OUTPATIENT
Start: 2020-12-03 | End: 2020-12-03

## 2020-12-03 RX ORDER — PREGABALIN 75 MG/1
75 CAPSULE ORAL
Qty: 30 CAP | Refills: 0 | Status: SHIPPED | OUTPATIENT
Start: 2020-12-03 | End: 2021-04-14

## 2020-12-03 RX ORDER — PREGABALIN 75 MG/1
75 CAPSULE ORAL ONCE
Status: COMPLETED | OUTPATIENT
Start: 2020-12-03 | End: 2020-12-03

## 2020-12-03 RX ORDER — MIDAZOLAM HYDROCHLORIDE 1 MG/ML
INJECTION, SOLUTION INTRAMUSCULAR; INTRAVENOUS AS NEEDED
Status: DISCONTINUED | OUTPATIENT
Start: 2020-12-03 | End: 2020-12-03 | Stop reason: HOSPADM

## 2020-12-03 RX ORDER — HYDROMORPHONE HYDROCHLORIDE 1 MG/ML
0.5 INJECTION, SOLUTION INTRAMUSCULAR; INTRAVENOUS; SUBCUTANEOUS
Status: ACTIVE | OUTPATIENT
Start: 2020-12-03 | End: 2020-12-04

## 2020-12-03 RX ORDER — ACETAMINOPHEN 325 MG/1
975 TABLET ORAL ONCE
Status: COMPLETED | OUTPATIENT
Start: 2020-12-03 | End: 2020-12-03

## 2020-12-03 RX ORDER — ESCITALOPRAM OXALATE 10 MG/1
20 TABLET ORAL DAILY
Status: DISCONTINUED | OUTPATIENT
Start: 2020-12-04 | End: 2020-12-03

## 2020-12-03 RX ORDER — SCOLOPAMINE TRANSDERMAL SYSTEM 1 MG/1
1 PATCH, EXTENDED RELEASE TRANSDERMAL
Status: COMPLETED | OUTPATIENT
Start: 2020-12-03 | End: 2020-12-06

## 2020-12-03 RX ORDER — TRAMADOL HYDROCHLORIDE 50 MG/1
50 TABLET ORAL
Qty: 28 TAB | Refills: 0 | Status: SHIPPED | OUTPATIENT
Start: 2020-12-03 | End: 2020-12-10

## 2020-12-03 RX ORDER — KETOROLAC TROMETHAMINE 30 MG/ML
15 INJECTION, SOLUTION INTRAMUSCULAR; INTRAVENOUS
Status: DISCONTINUED | OUTPATIENT
Start: 2020-12-03 | End: 2020-12-03 | Stop reason: HOSPADM

## 2020-12-03 RX ORDER — OXYCODONE HYDROCHLORIDE 5 MG/1
10 TABLET ORAL
Status: DISCONTINUED | OUTPATIENT
Start: 2020-12-03 | End: 2020-12-03 | Stop reason: HOSPADM

## 2020-12-03 RX ORDER — ESCITALOPRAM OXALATE 10 MG/1
20 TABLET ORAL
Status: DISCONTINUED | OUTPATIENT
Start: 2020-12-03 | End: 2020-12-07 | Stop reason: HOSPADM

## 2020-12-03 RX ORDER — FENTANYL CITRATE 50 UG/ML
25 INJECTION, SOLUTION INTRAMUSCULAR; INTRAVENOUS
Status: DISCONTINUED | OUTPATIENT
Start: 2020-12-03 | End: 2020-12-03 | Stop reason: HOSPADM

## 2020-12-03 RX ORDER — AMOXICILLIN 250 MG
1 CAPSULE ORAL 2 TIMES DAILY
Status: DISCONTINUED | OUTPATIENT
Start: 2020-12-03 | End: 2020-12-07 | Stop reason: HOSPADM

## 2020-12-03 RX ORDER — ONDANSETRON 2 MG/ML
INJECTION INTRAMUSCULAR; INTRAVENOUS AS NEEDED
Status: DISCONTINUED | OUTPATIENT
Start: 2020-12-03 | End: 2020-12-03 | Stop reason: HOSPADM

## 2020-12-03 RX ORDER — FACIAL-BODY WIPES
10 EACH TOPICAL DAILY PRN
Status: DISCONTINUED | OUTPATIENT
Start: 2020-12-05 | End: 2020-12-07 | Stop reason: HOSPADM

## 2020-12-03 RX ORDER — NALOXONE HYDROCHLORIDE 0.4 MG/ML
0.4 INJECTION, SOLUTION INTRAMUSCULAR; INTRAVENOUS; SUBCUTANEOUS AS NEEDED
Status: DISCONTINUED | OUTPATIENT
Start: 2020-12-03 | End: 2020-12-07 | Stop reason: HOSPADM

## 2020-12-03 RX ORDER — ONDANSETRON 2 MG/ML
4 INJECTION INTRAMUSCULAR; INTRAVENOUS
Status: DISPENSED | OUTPATIENT
Start: 2020-12-03 | End: 2020-12-04

## 2020-12-03 RX ORDER — TRAZODONE HYDROCHLORIDE 100 MG/1
100 TABLET ORAL
Status: DISCONTINUED | OUTPATIENT
Start: 2020-12-03 | End: 2020-12-07 | Stop reason: HOSPADM

## 2020-12-03 RX ORDER — BUSPIRONE HYDROCHLORIDE 5 MG/1
7.5 TABLET ORAL
Status: DISCONTINUED | OUTPATIENT
Start: 2020-12-03 | End: 2020-12-07 | Stop reason: HOSPADM

## 2020-12-03 RX ORDER — ROPIVACAINE HYDROCHLORIDE 2 MG/ML
INJECTION, SOLUTION EPIDURAL; INFILTRATION; PERINEURAL AS NEEDED
Status: DISCONTINUED | OUTPATIENT
Start: 2020-12-03 | End: 2020-12-03 | Stop reason: HOSPADM

## 2020-12-03 RX ORDER — ACETAMINOPHEN 325 MG/1
650 TABLET ORAL EVERY 6 HOURS
Status: DISCONTINUED | OUTPATIENT
Start: 2020-12-03 | End: 2020-12-07 | Stop reason: HOSPADM

## 2020-12-03 RX ORDER — SODIUM CHLORIDE 9 MG/ML
125 INJECTION, SOLUTION INTRAVENOUS CONTINUOUS
Status: DISPENSED | OUTPATIENT
Start: 2020-12-03 | End: 2020-12-04

## 2020-12-03 RX ORDER — HYDROMORPHONE HYDROCHLORIDE 1 MG/ML
.5-1 INJECTION, SOLUTION INTRAMUSCULAR; INTRAVENOUS; SUBCUTANEOUS
Status: DISCONTINUED | OUTPATIENT
Start: 2020-12-03 | End: 2020-12-03 | Stop reason: HOSPADM

## 2020-12-03 RX ORDER — LIDOCAINE HYDROCHLORIDE 20 MG/ML
INJECTION, SOLUTION INFILTRATION; PERINEURAL AS NEEDED
Status: DISCONTINUED | OUTPATIENT
Start: 2020-12-03 | End: 2020-12-03 | Stop reason: HOSPADM

## 2020-12-03 RX ORDER — SODIUM CHLORIDE 0.9 % (FLUSH) 0.9 %
5-40 SYRINGE (ML) INJECTION AS NEEDED
Status: DISCONTINUED | OUTPATIENT
Start: 2020-12-03 | End: 2020-12-07 | Stop reason: HOSPADM

## 2020-12-03 RX ORDER — FENTANYL CITRATE 50 UG/ML
INJECTION, SOLUTION INTRAMUSCULAR; INTRAVENOUS AS NEEDED
Status: DISCONTINUED | OUTPATIENT
Start: 2020-12-03 | End: 2020-12-03 | Stop reason: HOSPADM

## 2020-12-03 RX ORDER — OXYCODONE HYDROCHLORIDE 5 MG/1
5 TABLET ORAL
Qty: 42 TAB | Refills: 0 | Status: SHIPPED | OUTPATIENT
Start: 2020-12-03 | End: 2020-12-10

## 2020-12-03 RX ORDER — SODIUM CHLORIDE 0.9 % (FLUSH) 0.9 %
5-40 SYRINGE (ML) INJECTION EVERY 8 HOURS
Status: DISCONTINUED | OUTPATIENT
Start: 2020-12-03 | End: 2020-12-07 | Stop reason: HOSPADM

## 2020-12-03 RX ORDER — ASPIRIN 81 MG/1
81 TABLET ORAL 2 TIMES DAILY
Qty: 60 TAB | Refills: 0 | Status: SHIPPED | OUTPATIENT
Start: 2020-12-03 | End: 2021-04-14

## 2020-12-03 RX ORDER — ONDANSETRON 2 MG/ML
4 INJECTION INTRAMUSCULAR; INTRAVENOUS AS NEEDED
Status: DISCONTINUED | OUTPATIENT
Start: 2020-12-03 | End: 2020-12-03 | Stop reason: HOSPADM

## 2020-12-03 RX ADMIN — PREGABALIN 75 MG: 75 CAPSULE ORAL at 09:12

## 2020-12-03 RX ADMIN — ESCITALOPRAM OXALATE 20 MG: 10 TABLET ORAL at 23:22

## 2020-12-03 RX ADMIN — ONDANSETRON HYDROCHLORIDE 4 MG: 2 SOLUTION INTRAMUSCULAR; INTRAVENOUS at 10:42

## 2020-12-03 RX ADMIN — MIDAZOLAM HYDROCHLORIDE 2 MG: 2 INJECTION, SOLUTION INTRAMUSCULAR; INTRAVENOUS at 09:31

## 2020-12-03 RX ADMIN — ACETAMINOPHEN 975 MG: 325 TABLET ORAL at 09:12

## 2020-12-03 RX ADMIN — SODIUM CHLORIDE 125 ML/HR: 900 INJECTION, SOLUTION INTRAVENOUS at 20:42

## 2020-12-03 RX ADMIN — CEFAZOLIN SODIUM 2 G: 1 INJECTION, POWDER, FOR SOLUTION INTRAMUSCULAR; INTRAVENOUS at 16:28

## 2020-12-03 RX ADMIN — TRANEXAMIC ACID 1 G: 100 INJECTION, SOLUTION INTRAVENOUS at 10:45

## 2020-12-03 RX ADMIN — Medication 10 MG: at 11:01

## 2020-12-03 RX ADMIN — ACETAMINOPHEN 650 MG: 325 TABLET ORAL at 17:23

## 2020-12-03 RX ADMIN — PROPOFOL 50 MG: 10 INJECTION, EMULSION INTRAVENOUS at 10:25

## 2020-12-03 RX ADMIN — Medication 10 MG: at 10:46

## 2020-12-03 RX ADMIN — Medication 10 ML: at 17:23

## 2020-12-03 RX ADMIN — DOCUSATE SODIUM 50MG AND SENNOSIDES 8.6MG 1 TABLET: 8.6; 5 TABLET, FILM COATED ORAL at 17:23

## 2020-12-03 RX ADMIN — Medication 5 MG: at 11:36

## 2020-12-03 RX ADMIN — Medication 81 MG: at 17:23

## 2020-12-03 RX ADMIN — ROPIVACAINE HYDROCHLORIDE 20 ML: 2 INJECTION, SOLUTION EPIDURAL; INFILTRATION at 09:32

## 2020-12-03 RX ADMIN — DEXAMETHASONE SODIUM PHOSPHATE 4 MG: 4 INJECTION, SOLUTION INTRAMUSCULAR; INTRAVENOUS at 10:41

## 2020-12-03 RX ADMIN — SODIUM CHLORIDE, SODIUM LACTATE, POTASSIUM CHLORIDE, AND CALCIUM CHLORIDE: 600; 310; 30; 20 INJECTION, SOLUTION INTRAVENOUS at 11:01

## 2020-12-03 RX ADMIN — OXYCODONE HYDROCHLORIDE 10 MG: 10 TABLET, FILM COATED, EXTENDED RELEASE ORAL at 09:12

## 2020-12-03 RX ADMIN — FENTANYL CITRATE 50 MCG: 0.05 INJECTION, SOLUTION INTRAMUSCULAR; INTRAVENOUS at 09:31

## 2020-12-03 RX ADMIN — OXYCODONE HYDROCHLORIDE 5 MG: 5 TABLET ORAL at 23:22

## 2020-12-03 RX ADMIN — TRANEXAMIC ACID 1 G: 100 INJECTION, SOLUTION INTRAVENOUS at 11:41

## 2020-12-03 RX ADMIN — OXYCODONE HYDROCHLORIDE 5 MG: 5 TABLET ORAL at 16:27

## 2020-12-03 RX ADMIN — ACETAMINOPHEN 650 MG: 325 TABLET ORAL at 23:23

## 2020-12-03 RX ADMIN — SODIUM CHLORIDE 125 ML/HR: 900 INJECTION, SOLUTION INTRAVENOUS at 15:35

## 2020-12-03 RX ADMIN — OXYCODONE HYDROCHLORIDE 5 MG: 5 TABLET ORAL at 19:51

## 2020-12-03 RX ADMIN — LIDOCAINE HYDROCHLORIDE 40 MG: 20 INJECTION, SOLUTION INFILTRATION; PERINEURAL at 10:25

## 2020-12-03 RX ADMIN — SODIUM CHLORIDE, SODIUM LACTATE, POTASSIUM CHLORIDE, AND CALCIUM CHLORIDE 125 ML/HR: 600; 310; 30; 20 INJECTION, SOLUTION INTRAVENOUS at 09:12

## 2020-12-03 RX ADMIN — ONDANSETRON 4 MG: 2 INJECTION INTRAMUSCULAR; INTRAVENOUS at 20:40

## 2020-12-03 RX ADMIN — Medication 5 MG: at 11:16

## 2020-12-03 RX ADMIN — TRAZODONE HYDROCHLORIDE 100 MG: 100 TABLET ORAL at 23:22

## 2020-12-03 RX ADMIN — Medication 10 MG: at 10:38

## 2020-12-03 RX ADMIN — Medication 10 MG: at 10:29

## 2020-12-03 RX ADMIN — BUPIVACAINE HYDROCHLORIDE 2 ML: 5 INJECTION, SOLUTION EPIDURAL; INTRACAUDAL; PERINEURAL at 10:01

## 2020-12-03 RX ADMIN — FAMOTIDINE 20 MG: 20 TABLET, FILM COATED ORAL at 17:23

## 2020-12-03 RX ADMIN — CEFAZOLIN SODIUM 2 G: 1 POWDER, FOR SOLUTION INTRAMUSCULAR; INTRAVENOUS at 10:32

## 2020-12-03 RX ADMIN — ROPIVACAINE HYDROCHLORIDE 20 ML: 2 INJECTION, SOLUTION EPIDURAL; INFILTRATION at 09:28

## 2020-12-03 RX ADMIN — PROPOFOL 50 MCG/KG/MIN: 10 INJECTION, EMULSION INTRAVENOUS at 10:25

## 2020-12-03 NOTE — OP NOTES
OPERATIVE REPORT     Admit Date: 12/3/2020  Admit Diagnosis: Primary osteoarthritis of right knee [M17.11]  Primary osteoarthritis of right knee [M17.11]  Primary osteoarthritis of right knee [M17.11]    Date of Procedure: 12/3/2020   Preoperative Diagnosis: Primary osteoarthritis of right knee [M17.11]  Postoperative Diagnosis: * No post-op diagnosis entered *    Procedure: Procedure(s):  RIGHT TOTAL KNEE ARTHROPLASTY MAKOPLASTY  Surgeon: Morena Kathleen MD  Assistant(s): Pastor Valdivia PA-C  Anesthesia: Spinal   Estimated Blood Loss: 350cc  Specimens: * No specimens in log *   Complications: None       INDICATIONS:   The patient is a 63 y.o., female who has complained of a long history of knee pain. The patient  has failed conservative treatment and presents for definitive operative care. Informed consent obtained including a discussion of the risks and benefits, which include, but are not limited to, bleeding, infection, neurovascular damage, wound complications, pain and stiffness in the knee, periprosthetic loosening, fracture dislocation and DVT, the patient consented for the procedure. DESCRIPTION OF PROCEDURE:        The patient was seen in the preoperative holding area. The patient was positively identified. The limb was initialed,  questions were answered. The patient was given Ancef preop for an antibiotic. The patient was subsequently taken to the operating room. The patient underwent spinal anesthesia. The patient was positioned in the supine position. All bony prominences were well padded. The limb was prepped and draped in a sterile fashion. The appropriate pause for safety was performed. A mid-line incision was created. Utilizing an incision from above the superior pole of the patella distally to the tibial tubercle. The incision was taken down through the skin and subcutaneous tissue until the retinaculum could be identified.  This was sharply incised utilizing a medial parapatellar incision. The femoral array was placed at the superior portion of the patella. The patellar was everted, a planar resurfacing was performed and the drill guide was placed with the appropriate rotation. The medial-based soft tissue was then retracted as well as well as the lateral tissue. Jean-Paul pins were placed within the incision for thetibial array (one hand breadth proximal to the superior pole of the patella). The femoral and tibial trackers were placed. The hip center or rotation was established. Registration was performed on the femur and tibia. Osteophytes were removed. The knee was balanced in both flexion and extension with force applied. The computer model of implants and position was verified. Once this was complete the MAKOplasty robot was positioned. Standard cuts were made for the tibia first. The tibial cut was removed. The remaining femoral cuts were made with the robot. The blade was changed and the medial meniscus was removed. The tibial preparation was completed. Including removal of residual medial and lateral mensci. Tibial baseplate placement and keel preparation were performed along with drill holes for the tibial baseplate. Final bony osteophytes were removed and the meniscal rim was removed. The knee was injected with 60cc of Morphine / Ketoralac and Lidocaine. Trial implants were placed and range of motion along with overall fit was established with very good integrity of the MCL noted. The jean-paul pins and trackers were removed and accounted for prior to closure. All the bony surfaces were irrigated. The tibia was placed first, then the poly, residual osteophytes were removed and then the femur. Finally, the patella was placed and press-fit with the clamp / impaction. There was normal tracking of the patella at the conclusion of the case. The knee was very stable after it was taken through a full range of motion.  The wound was closed with 0 Vicryl and then number 2 Quill proximally. Once this had been completed, the skin was closed with 2-0 Vicryl 4-0 monocryl suture and Dermabond. A sterile dressing was applied. The patient was taken from the operating room in stable condition. OPERATIVE FINDINGS : Severe varus OA of the knee. IMPLANTS :   4F, 4T, 10mm poly, 29mm patella    POST OPERATIVE CONSIDERATIONS :  WBAT    JUSTIFICATION FOR SURGICAL ASSISTANT:   Surgical Assistant, was requried and necessary in this case, to help with soft tissue retraction, extremity positioning, equiment management, implant management, and wound closure.     Sylvie Frazier MD

## 2020-12-03 NOTE — DISCHARGE SUMMARY
Total Knee Replacement Home Discharge Summary    Patient ID:  Yelena Marvin  1955  63 y.o.  [id number]    Admit date: 12/3/2020    Discharge date and time: No discharge date for patient encounter. Admitting Physician: Ivana Harrington MD     Admission Diagnoses: Primary osteoarthritis of right knee [M17.11]  Primary osteoarthritis of right knee [M17.11]    Discharge Diagnoses: Active Problems:    Primary osteoarthritis of right knee (12/3/2020)        Surgeon: Rafaela Bowie MD    HOSPITAL COURSE:  Yelena Marvin was admitted on12/3/2020 and underwent successful total knee arthroplasty. There were no complications intraoperatively and the patient was transferred to the orthopaedic floor in stable condition. Physical therapy and occupational therapy initiated their evaluation and treatment and continued to follow the patient until the patient was discharged. DVT prophylaxis was initiated on the day of surgery including; Pharmocologic prophylaxis and bilateral foot pumps. The incision site remained clean, dry, and intact. The patient remained neurovascularly intact. At the time of discharge, the patient was able to ambulate safely, go up and down stairs and had an understanding of the explicit discharge precautions and instructions following surgery. The patient had adequate oral pain control and good PO intake. Important in Hospital Events : none    Post Op complications: None    Current Discharge Medication List      CONTINUE these medications which have NOT CHANGED    Details   traZODone (DESYREL) 50 mg tablet Take 100 mg by mouth nightly. busPIRone (BUSPAR) 7.5 mg tablet Take 1 Tab by mouth two (2) times daily as needed (anxiety).   Qty: 180 Tab, Refills: 3    Associated Diagnoses: Anxiety      escitalopram oxalate (LEXAPRO) 20 mg tablet TAKE 1 TABLET BY MOUTH ONCE DAILY  Qty: 90 Tab, Refills: 1    Associated Diagnoses: Depression, unspecified depression type      acetaminophen (Tylenol Extra Strength) 500 mg tablet Take 1,000 mg by mouth every six (6) hours as needed for Pain.              Discharged to: Home    Follow-up : Scheduled for 2 weeks post-op    Signed:  Sumanth Arvizu MD  12/3/2020  9:02 AM

## 2020-12-03 NOTE — PERIOP NOTES
TRANSFER - OUT REPORT:    Verbal report given to Arun Domingo RN (name) on Jair Vasquez  being transferred to [id number] (unit) for routine post - op       Report consisted of patients Situation, Background, Assessment and   Recommendations(SBAR). Information from the following report(s) SBAR was reviewed with the receiving nurse. Lines:   Peripheral IV 12/03/20 Left Wrist (Active)   Site Assessment Clean, dry, & intact 12/03/20 1230   Phlebitis Assessment 0 12/03/20 1230   Infiltration Assessment 0 12/03/20 1230   Dressing Status Clean, dry, & intact 12/03/20 1230   Dressing Type Transparent 12/03/20 1230   Hub Color/Line Status Infusing 12/03/20 1230        Opportunity for questions and clarification was provided.       Patient transported with:   Registered Nurse

## 2020-12-03 NOTE — ANESTHESIA PREPROCEDURE EVALUATION
Relevant Problems   No relevant active problems       Anesthetic History     PONV          Review of Systems / Medical History  Patient summary reviewed, nursing notes reviewed and pertinent labs reviewed    Pulmonary            Asthma : well controlled  Pertinent negatives: No recent URI     Neuro/Psych         Psychiatric history     Cardiovascular                Pertinent negatives: No hypertension  Exercise tolerance: >4 METS     GI/Hepatic/Renal                Endo/Other        Arthritis and anemia  Pertinent negatives: No hypothyroidism   Other Findings              Physical Exam    Airway  Mallampati: II  TM Distance: 4 - 6 cm  Neck ROM: normal range of motion   Mouth opening: Normal     Cardiovascular  Regular rate and rhythm,  S1 and S2 normal,  no murmur, click, rub, or gallop             Dental    Dentition: Lower dentition intact and Upper dentition intact     Pulmonary  Breath sounds clear to auscultation               Abdominal         Other Findings            Anesthetic Plan    ASA: 2  Anesthesia type: spinal and regional - popliteal fossa block and sciatic single shot          Induction: Intravenous  Anesthetic plan and risks discussed with: Patient

## 2020-12-03 NOTE — PROGRESS NOTES
12/3/2020  4:59 PM  Reason for Admission: Elective - Primary OA of right knee requiring Surgery    Assessment:   [x]In person with pt   []Via p/c with pt   []With family member in person. Who/Relation:     []With family member via p/c. Who/Relation:   []Chart Review    RUR:   Risk Level: []Low []Moderate []High  Value-based purchasing: [] Yes [x] No  Bundle patient: [] Yes [x] No   Specify:     Advance Directive: Full Code. No AD on file. Assessment:    Age: 63    Sex: [] Male [x]Female     Residency: [x]Private residence []Apartment []Assisted Living []LTC []Other:   Exterior Steps: 0  Interior Steps: 1 flight     Lives With: [x]With spouse [x]Other family members (pt's granddaughter will stay with pt during recovery Anne Marie Love [phone]) []Underage children []Alone []Care provider []Other:    Prior functioning:  [x]Independent []Dependent []Partial dependence   Pt requires assistance with: []Bathing []Dressing []Toileting []Ambulation     Prior DME required:  [x]None []RW []Cane []Crutches []Bedside commode []CPAP []Home O2 (Liter/Provider: ) []Nebulizer   []Shower Chair []Wheelchair []Hospital Bed []Guy []Stair lift []Rollator []Other:    DME available: []None [x]RW [x]Cane []Crutches [x]Bedside commode []CPAP []Home O2 (Liter/Provider: ) []Nebulizer   [x]Shower Chair []Wheelchair []Hospital Bed []Guy []Stair lift []Rollator []Other:    Rehab history: []None [x]Outpatient PT []Home Health (Provider/Date: ) []SNF (Provider/Date: ) []IPR (Provider/Date: ) []LTC (Provider/Date: )    Discharge Concerns: []Yes [x]No []Unknown   Describe: Insurer:  Rob[employer]  Observation notice provided in writing to patient and/or caregiver as well as verbal explanation of the policy. Patients who are in outpatient status also receive the Observation notice.       PCP: Desmond Rai MD   Name of Practice: [address] [facility]   Current patient: [x]Yes []No   Approximate date of last visit: 10/23/20   Access to virtual PCP visits: [x]Yes []No    Pharmacy:  [facility] Transport:  Family        Transition of care plan:    []Unable to determine at this time. Awaiting clinical progress, and disposition recommendations. [] Home with outpatient follow-up    [x] Home with Outpatient PT and outpatient follow-up   Pt aware of OP appt? [x]Yes, Provider: [address] [place] [address]   []Not scheduled   Transport provider: Family    [] Home with family assistance as needed and outpatient follow-up   Family able to assist:    Schedule:  Transport provider:      [] Home with Home Health   - Provider:     []SNF/IPR   -[]Preferences given:   []Listing provided and preferences requested   -Status: []Pending []Accepted:    -Auth required: []Yes []No    -Auth initiated date:   -3 midnight stay required: []Yes []No  Date satisfied:     [] Other:     Marielos Covarrubias MA    Care Management Interventions  PCP Verified by CM: Yes(alfred)  Mode of Transport at Discharge:  Other (see comment)(Family)  Transition of Care Consult (CM Consult): Discharge Planning  MyChart Signup: No  Discharge Durable Medical Equipment: No  Physical Therapy Consult: Yes  Occupational Therapy Consult: Yes  Speech Therapy Consult: No  Current Support Network: Lives with Spouse(Pt's granddaughter will stay with pt. )  Confirm Follow Up Transport: Family  Discharge Location  Discharge Placement: Home with outpatient services

## 2020-12-03 NOTE — ANESTHESIA PROCEDURE NOTES
Spinal Block    Start time: 12/3/2020 9:56 AM  End time: 12/3/2020 10:01 AM  Performed by: James Ying CRNA  Authorized by: Wenceslao Wray DO     Pre-procedure:   Indications: at surgeon's request and primary anesthetic  Preanesthetic Checklist: patient identified, risks and benefits discussed, anesthesia consent, site marked, patient being monitored and timeout performed    Timeout Time: 09:56          Spinal Block:   Patient Position:  Seated  Prep Region:  Lumbar  Prep: chlorhexidine      Location:  L3-4  Technique:  Single shot        Needle:   Needle Type:  Pencan  Needle Gauge:  25 G  Attempts:  1      Events: CSF confirmed, no blood with aspiration and no paresthesia        Assessment:  Insertion:  Uncomplicated  Patient tolerance:  Patient tolerated the procedure well with no immediate complications

## 2020-12-03 NOTE — ANESTHESIA POSTPROCEDURE EVALUATION
Procedure(s):  RIGHT TOTAL KNEE ARTHROPLASTY MAKOPLASTY. spinal, regional, MAC    Anesthesia Post Evaluation      Multimodal analgesia: multimodal analgesia used between 6 hours prior to anesthesia start to PACU discharge  Patient location during evaluation: PACU  Patient participation: complete - patient participated  Level of consciousness: awake and alert  Pain management: adequate  Airway patency: patent  Anesthetic complications: no  Cardiovascular status: acceptable  Respiratory status: acceptable  Hydration status: acceptable  Post anesthesia nausea and vomiting:  none      INITIAL Post-op Vital signs:   Vitals Value Taken Time   /66 12/3/2020 12:50 PM   Temp 36.6 °C (97.8 °F) 12/3/2020 12:39 PM   Pulse 72 12/3/2020 12:55 PM   Resp 12 12/3/2020 12:55 PM   SpO2 100 % 12/3/2020 12:55 PM   Vitals shown include unvalidated device data.

## 2020-12-03 NOTE — ANESTHESIA PROCEDURE NOTES
Peripheral Block    Start time: 12/3/2020 9:21 AM  End time: 12/3/2020 9:32 AM  Performed by: Samson Hester DO  Authorized by: Samson Hester DO       Pre-procedure: Indications: at surgeon's request and post-op pain management    Preanesthetic Checklist: patient identified, risks and benefits discussed, site marked, timeout performed, anesthesia consent given and patient being monitored    Timeout Time: 09:21          Block Type:   Block Type:  Femoral single shot, sciatic single shot and popliteal  Laterality:  Right  Monitoring:  Continuous pulse ox, frequent vital sign checks, heart rate, responsive to questions and oxygen  Injection Technique:  Single shot  Procedures: ultrasound guided    Patient Position: supine  Prep: chlorhexidine    Needle Type:  Stimuplex  Needle Gauge:  21 G  Needle Localization:  Ultrasound guidance    Assessment:  Number of attempts:  1  Injection Assessment:  Incremental injection every 5 mL, local visualized surrounding nerve on ultrasound, negative aspiration for blood, no paresthesia and no intravascular symptoms  Patient tolerance:  Patient tolerated the procedure well with no immediate complications  Sciatic block performed with nerve stimulation and landmark identification. Needle used was 4\" stimuplex 21g. 20cc 0.2% ropivacaine injected intermittently with negative aspiration.

## 2020-12-03 NOTE — PROGRESS NOTES
Problem: Mobility Impaired (Adult and Pediatric)  Goal: *Acute Goals and Plan of Care (Insert Text)  Description: FUNCTIONAL STATUS PRIOR TO ADMISSION: Patient was independent and active without use of DME.    HOME SUPPORT PRIOR TO ADMISSION: The patient lived with  and niece but did not require assist. 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 He will go into respite care for her recovery and her niece will be there to assist her. Physical Therapy Goals  Initiated 12/3/2020    1. Patient will move from supine to sit and sit to supine  in bed with independence within 4 days. 2. Patient will perform sit to stand with modified independence within 4 days. 3. Patient will ambulate with modified independence for 100 feet with the least restrictive device within 4 days. 4. Patient will ascend/descend 4 stairs with 1 handrail(s) with minimal assistance/contact guard assist within 4 days. 5. Patient will perform home exercise program per protocol with independence within 4 days. 6. Patient will demonstrate AROM 0-90 degrees in operative joint within 4 days. Outcome: Progressing Towards Goal   PHYSICAL THERAPY EVALUATION  Patient: Jesus Ferris (89 y.o. female)  Date: 12/3/2020  Primary Diagnosis: Primary osteoarthritis of right knee [M17.11]  Primary osteoarthritis of right knee [M17.11]  Primary osteoarthritis of right knee [M17.11]  Procedure(s) (LRB):  RIGHT TOTAL KNEE ARTHROPLASTY MAKOPLASTY (Right) Day of Surgery   Precautions:   WBAT, Fall(limit flexion on operative knee to 90 degrees)    ASSESSMENT  Based on the objective data described below, the patient presents with decreased ROM and strength to RLE, decreased bed mobility, transfers and gait following admission for right TKA, makoplasty. Patient has a lot of questions and needs cues to slow down and wait for instruction. She has all DME and will need OP PT upon discharge.     Current Level of Function Impacting Discharge (mobility/balance): Educated patient regarding knee exercises and limit of knee flexion to 90 degrees. Handout provided and she expressed understanding. Patient stood and took a few steps with rolling walker +2 min assist to bedside commode and back. Right knee maegan. Vitals stable. Functional Outcome Measure: The patient scored 50/100 on the Barthel outcome measure. Other factors to consider for discharge: none     Patient will benefit from skilled therapy intervention to address the above noted impairments. PLAN :  Recommendations and Planned Interventions: bed mobility training, transfer training, gait training, and therapeutic exercises      Frequency/Duration: Patient will be followed by physical therapy:  twice daily to address goals. Recommendation for discharge: (in order for the patient to meet his/her long term goals)  Outpatient physical therapy follow up recommended for TKA    This discharge recommendation:  Has been made in collaboration with the attending provider and/or case management    IF patient discharges home will need the following DME: patient owns DME required for discharge         SUBJECTIVE:   Patient stated I read the book.     OBJECTIVE DATA SUMMARY:   HISTORY:    Past Medical History:   Diagnosis Date    Adverse effect of other narcotics, sequela     Most pills cause hallucination and nausea    Anxiety     Asthma     Benign heart murmur     Difficult intubation 02/16/2012    Patient states she has a small mouth    Hypertension     resolved with weight loss    Hypoglycemia 10/2020    Hypothyroidism     Iron deficiency anemia     Osteoarthritis     PONV (postoperative nausea and vomiting)     Psychiatric disorder     anxiety, depression    Snoring     Syncopal episodes     Post Gastric bypass- food helps    Urinary incontinence      Past Surgical History:   Procedure Laterality Date    HX CATARACT REMOVAL Bilateral 2011 & 2012    HX CERVICAL FUSION 2011    C2-7    HX  SECTION  X2    HX CHOLECYSTECTOMY  2018    HX GASTRIC BYPASS      HX HYSTERECTOMY  1992    HX KNEE ARTHROSCOPY Right 2008    HX TONSILLECTOMY      HX WISDOM TEETH EXTRACTION         Personal factors and/or comorbidities impacting plan of care: none    Home Situation  Current DME Used/Available at Home: Walker, rolling, Cane, straight, Shower chair, Grab bars, Raised toilet seat, Wheelchair    EXAMINATION/PRESENTATION/DECISION MAKING:   Critical Behavior:  Neurologic State: Alert  Orientation Level: Oriented X4     Safety/Judgement: Good awareness of safety precautions       Skin:  not observed  Range Of Motion:  AROM: Within functional limits(lacking end range dorsiflexion on operative leg)              RLE AROM  R Knee Flexion: 90  R Knee Extension: 5        Strength:    Strength: Within functional limits(lacking SLR on operative leg)                    Tone & Sensation:   Tone: Normal              Sensation: Intact(still numb to operative leg)                 Functional Mobility:  Bed Mobility:     Supine to Sit: Additional time;Contact guard assistance  Sit to Supine: Minimum assistance  Scooting: Contact guard assistance  Transfers:  Sit to Stand: Assist x2;Minimum assistance  Stand to Sit: Assist x2;Contact guard assistance                       Balance:   Sitting: Intact  Standing: Intact; With support  Ambulation/Gait Training:  Distance (ft): 4 Feet (ft)  Assistive Device: Gait belt;Walker, rolling  Ambulation - Level of Assistance: Assist x2;Minimal assistance        Gait Abnormalities: Step to gait; Decreased step clearance  Right Side Weight Bearing: As tolerated                                            Therapeutic Exercises:    Ankle pumps, quad and heel sets, heel slides    Functional Measure:  Barthel Index:    Bathin  Bladder: 10  Bowels: 10  Groomin  Dressin  Feeding: 10  Mobility: 0  Stairs: 0  Toilet Use: 5  Transfer (Bed to Chair and Back): 5  Total: 50/100 The Barthel ADL Index: Guidelines  1. The index should be used as a record of what a patient does, not as a record of what a patient could do. 2. The main aim is to establish degree of independence from any help, physical or verbal, however minor and for whatever reason. 3. The need for supervision renders the patient not independent. 4. A patient's performance should be established using the best available evidence. Asking the patient, friends/relatives and nurses are the usual sources, but direct observation and common sense are also important. However direct testing is not needed. 5. Usually the patient's performance over the preceding 24-48 hours is important, but occasionally longer periods will be relevant. 6. Middle categories imply that the patient supplies over 50 per cent of the effort. 7. Use of aids to be independent is allowed. Chao Louis., Barthel, DEILEEN. ([id number]). Functional evaluation: the Barthel Index. [address] [facility] (14)2. VAUGHN OsborneF, Akins Art., Elijah [place]., [place], [address] (1999). Measuring the change indisability after inpatient rehabilitation; comparison of the responsiveness of the Barthel Index and Functional [place] Measure. Journal of Neurology, Neurosurgery, and Psychiatry, 66(4), 538-507. Des Carreon, N.J.A, JOHN Burgos, & Amanda Dudley, M.A. (2004.) Assessment of post-stroke quality of life in cost-effectiveness studies: The usefulness of the Barthel Index and the EuroQoL-5D.  Quality of Life Research, 15, 361-47           Physical Therapy Evaluation Charge Determination   History Examination Presentation Decision-Making   MEDIUM  Complexity : 1-2 comorbidities / personal factors will impact the outcome/ POC  LOW Complexity : 1-2 Standardized tests and measures addressing body structure, function, activity limitation and / or participation in recreation  LOW Complexity : Stable, uncomplicated  Other outcome measures Barthel  LOW       Based on the above components, the patient evaluation is determined to be of the following complexity level: LOW     Pain Rating:  3/10    Activity Tolerance:   Good    After treatment patient left in no apparent distress:   Supine in bed, Call bell within reach, Bed / chair alarm activated, Caregiver / family present, and Side rails x 3    COMMUNICATION/EDUCATION:   The patients plan of care was discussed with: Registered nurse and Case management. Fall prevention education was provided and the patient/caregiver indicated understanding. and Patient/family agree to work toward stated goals and plan of care.     Thank you for this referral.  Valente Bardales, PT   Time Calculation: 34 mins

## 2020-12-04 LAB
ANION GAP SERPL CALC-SCNC: 6 MMOL/L (ref 5–15)
BUN SERPL-MCNC: 18 MG/DL (ref 6–20)
BUN/CREAT SERPL: 26 (ref 12–20)
CALCIUM SERPL-MCNC: 7.6 MG/DL (ref 8.5–10.1)
CHLORIDE SERPL-SCNC: 108 MMOL/L (ref 97–108)
CO2 SERPL-SCNC: 24 MMOL/L (ref 21–32)
CREAT SERPL-MCNC: 0.68 MG/DL (ref 0.55–1.02)
GLUCOSE SERPL-MCNC: 80 MG/DL (ref 65–100)
HGB BLD-MCNC: 7.9 G/DL (ref 11.5–16)
POTASSIUM SERPL-SCNC: 3.8 MMOL/L (ref 3.5–5.1)
SODIUM SERPL-SCNC: 138 MMOL/L (ref 136–145)

## 2020-12-04 PROCEDURE — 2709999900 HC NON-CHARGEABLE SUPPLY

## 2020-12-04 PROCEDURE — 74011250636 HC RX REV CODE- 250/636: Performed by: PHYSICIAN ASSISTANT

## 2020-12-04 PROCEDURE — 36415 COLL VENOUS BLD VENIPUNCTURE: CPT

## 2020-12-04 PROCEDURE — 65270000029 HC RM PRIVATE

## 2020-12-04 PROCEDURE — 80048 BASIC METABOLIC PNL TOTAL CA: CPT

## 2020-12-04 PROCEDURE — 96374 THER/PROPH/DIAG INJ IV PUSH: CPT

## 2020-12-04 PROCEDURE — 99218 HC RM OBSERVATION: CPT

## 2020-12-04 PROCEDURE — 74011250637 HC RX REV CODE- 250/637: Performed by: ORTHOPAEDIC SURGERY

## 2020-12-04 PROCEDURE — 97165 OT EVAL LOW COMPLEX 30 MIN: CPT

## 2020-12-04 PROCEDURE — 85018 HEMOGLOBIN: CPT

## 2020-12-04 PROCEDURE — 74011000250 HC RX REV CODE- 250: Performed by: PHYSICIAN ASSISTANT

## 2020-12-04 PROCEDURE — 97535 SELF CARE MNGMENT TRAINING: CPT

## 2020-12-04 PROCEDURE — 74011250637 HC RX REV CODE- 250/637: Performed by: PHYSICIAN ASSISTANT

## 2020-12-04 RX ORDER — HYDROMORPHONE HYDROCHLORIDE 2 MG/1
4 TABLET ORAL
Status: DISCONTINUED | OUTPATIENT
Start: 2020-12-04 | End: 2020-12-05

## 2020-12-04 RX ORDER — HYDROMORPHONE HYDROCHLORIDE 2 MG/1
2 TABLET ORAL
Status: DISCONTINUED | OUTPATIENT
Start: 2020-12-04 | End: 2020-12-07 | Stop reason: HOSPADM

## 2020-12-04 RX ADMIN — DOCUSATE SODIUM 50MG AND SENNOSIDES 8.6MG 1 TABLET: 8.6; 5 TABLET, FILM COATED ORAL at 09:38

## 2020-12-04 RX ADMIN — HYDROMORPHONE HYDROCHLORIDE 2 MG: 2 TABLET ORAL at 17:44

## 2020-12-04 RX ADMIN — ONDANSETRON 4 MG: 2 INJECTION INTRAMUSCULAR; INTRAVENOUS at 13:15

## 2020-12-04 RX ADMIN — CEFAZOLIN SODIUM 2 G: 1 INJECTION, POWDER, FOR SOLUTION INTRAMUSCULAR; INTRAVENOUS at 09:37

## 2020-12-04 RX ADMIN — Medication 81 MG: at 09:38

## 2020-12-04 RX ADMIN — FAMOTIDINE 20 MG: 20 TABLET, FILM COATED ORAL at 09:38

## 2020-12-04 RX ADMIN — Medication 10 ML: at 13:16

## 2020-12-04 RX ADMIN — OXYCODONE HYDROCHLORIDE 5 MG: 5 TABLET ORAL at 03:30

## 2020-12-04 RX ADMIN — PREGABALIN 75 MG: 75 CAPSULE ORAL at 09:38

## 2020-12-04 RX ADMIN — TRAZODONE HYDROCHLORIDE 100 MG: 100 TABLET ORAL at 21:43

## 2020-12-04 RX ADMIN — DOCUSATE SODIUM 50MG AND SENNOSIDES 8.6MG 1 TABLET: 8.6; 5 TABLET, FILM COATED ORAL at 17:44

## 2020-12-04 RX ADMIN — POLYETHYLENE GLYCOL 3350 17 G: 17 POWDER, FOR SOLUTION ORAL at 09:38

## 2020-12-04 RX ADMIN — FAMOTIDINE 20 MG: 20 TABLET, FILM COATED ORAL at 17:43

## 2020-12-04 RX ADMIN — OXYCODONE HYDROCHLORIDE 5 MG: 5 TABLET ORAL at 09:38

## 2020-12-04 RX ADMIN — Medication 81 MG: at 17:43

## 2020-12-04 RX ADMIN — SODIUM CHLORIDE 500 ML: 900 INJECTION, SOLUTION INTRAVENOUS at 06:06

## 2020-12-04 RX ADMIN — ACETAMINOPHEN 650 MG: 325 TABLET ORAL at 09:38

## 2020-12-04 RX ADMIN — ACETAMINOPHEN 650 MG: 325 TABLET ORAL at 21:44

## 2020-12-04 RX ADMIN — ESCITALOPRAM OXALATE 20 MG: 10 TABLET ORAL at 21:43

## 2020-12-04 RX ADMIN — ACETAMINOPHEN 650 MG: 325 TABLET ORAL at 13:07

## 2020-12-04 RX ADMIN — HYDROMORPHONE HYDROCHLORIDE 2 MG: 2 TABLET ORAL at 13:07

## 2020-12-04 RX ADMIN — CEFAZOLIN SODIUM 2 G: 1 INJECTION, POWDER, FOR SOLUTION INTRAMUSCULAR; INTRAVENOUS at 03:30

## 2020-12-04 RX ADMIN — Medication 10 ML: at 21:46

## 2020-12-04 RX ADMIN — HYDROMORPHONE HYDROCHLORIDE 4 MG: 2 TABLET ORAL at 21:43

## 2020-12-04 NOTE — PROGRESS NOTES
Patient helped to the [facility] using the gait belt and walker. Patient ambulated safety to the [facility] and was able to void. Patient then proceeded to stand up and wipe in between her legs. In doing so, patient left knee buckled, however, the patient did not fall. When asked about her fall history, patient informed Kimo Ann, that she has problems with buckling in both knee and estimated that she has fallen nine times within the year. Patient's bed alarm is on, call bell is within reach, and the bed is in the lowest position possible.

## 2020-12-04 NOTE — PROGRESS NOTES
Attempted to work with the patient for Physical Therapy, chart reviewed and discussed with nurse cleared for therapy. Patient supine on bed check BP and still low BP 91/51, HR 67 supine at rest. Instructed patient to continue to do some active range of motion exercise on both LE as often as she can when awake. Left LE all planes and right LE elevate on blue wedge foam, quad sets and gluteal set for core strengthening. communicated with nurse who agreed to monitor patient.

## 2020-12-04 NOTE — PROGRESS NOTES
OCCUPATIONAL THERAPY EVALUATION/DISCHARGE  Patient: Janey Ruiz (79 y.o. female)  Date: 12/4/2020  Primary Diagnosis: Primary osteoarthritis of right knee [M17.11]  Primary osteoarthritis of right knee [M17.11]  Primary osteoarthritis of right knee [M17.11]  Procedure(s) (LRB):  RIGHT TOTAL KNEE ARTHROPLASTY MAKOPLASTY (Right) 1 Day Post-Op   Precautions:   WBAT, Fall    ASSESSMENT  Based on the objective data described below, the patient presents with mild ADL impairment due to RLE numbness, weakness, and pain s/p R TKA, makoplasty. General strength WFL with exception of RLE. Anticipate imporved performance once sensation returns. Patient's ROM WFL for LB ADL tasks. Education provided regarding reducing fall risk in stand, need for continued use of RW in stand for safety while maintaining 1 hand support, and completing simple home mgmt tasks at RW level in order to safely return home. Patient verbalizes good understanding of all education. Patient owns all recommended DME for safe ADL task completion. No further skilled OT services are required at this time. Current Level of Function (ADLs/self-care): CGA required for all ADL in stand due to above stated limitations, transfers with assist x 2 due to continued numbness    Functional Outcome Measure: The patient scored 60/100 on the Barthel Index outcome measure      Other factors to consider for discharge: Patient has family assistance upon return home     PLAN :  Recommendation for discharge: (in order for the patient to meet his/her long term goals)  No skilled occupational therapy/ follow up rehabilitation needs identified at this time. This discharge recommendation:  Has not yet been discussed the attending provider and/or case management    IF patient discharges home will need the following DME: patient owns DME required for discharge       SUBJECTIVE:   Patient stated I haven't seen the nurse yet.     OBJECTIVE DATA SUMMARY:   HISTORY:   Past Medical History:   Diagnosis Date    Adverse effect of other narcotics, sequela     Most pills cause hallucination and nausea    Anxiety     Asthma     Benign heart murmur     Difficult intubation 2012    Patient states she has a small mouth    Hypertension     resolved with weight loss    Hypoglycemia 10/2020    Hypothyroidism     Iron deficiency anemia     Osteoarthritis     PONV (postoperative nausea and vomiting)     Psychiatric disorder     anxiety, depression    Snoring     Syncopal episodes     Post Gastric bypass- food helps    Urinary incontinence      Past Surgical History:   Procedure Laterality Date    HX CATARACT REMOVAL Bilateral  &     HX CERVICAL FUSION      C2-7    HX  SECTION  X2    HX CHOLECYSTECTOMY  2018    HX GASTRIC BYPASS      HX HYSTERECTOMY  1992    HX KNEE ARTHROSCOPY Right 2008    HX TONSILLECTOMY      HX WISDOM TEETH EXTRACTION         Prior Level of Function/Environment/Context: Patient was independent with all basic self care tasks and related mobility, though with recent history of increased falls due to LE weakness. Completes home mgmt tasks with independence, including providing care to spouse  Expanded or extensive additional review of patient history:     Home Situation  Home Environment: Private residence  # Steps to Enter: 0  Wheelchair Ramp: No  One/Two Story Residence: Two story  # of Interior Steps: 15  Interior Rails: Right  Lift Chair Available: No  Living Alone: No  Support Systems: Spouse/Significant Other/Partner  Patient Expects to be Discharged to[de-identified] Private residence  Current DME Used/Available at Home: Wheelchair, Walker, rolling, Transfer bench, Raised toilet seat, Grab bars  Tub or Shower Type: Tub/Shower combination    Hand dominance: Right    EXAMINATION OF PERFORMANCE DEFICITS:  Cognitive/Behavioral Status:  Neurologic State: Alert  Orientation Level: Oriented X4  Cognition: Impulsive; Follows commands Safety/Judgement: Fall prevention;Home safety    Skin: R knee surgical incision, bandaged      Hearing: Auditory  Auditory Impairment: None    Vision/Perceptual:            Corrective Lenses: Glasses    Range of Motion:  BUE WFL  AROM: Generally decreased, functional(R knee limited flexion)  PROM: Within functional limits                      Strength:  BUE WFL   Strength: Generally decreased, functional(RLE generally decreased)                Coordination:  Coordination: Within functional limits(RLE generally decreased)  Fine Motor Skills-Upper: Left Intact; Right Intact    Gross Motor Skills-Upper: Left Intact; Right Intact    Tone & Sensation:    Tone: Normal  Sensation: Impaired(RLE impaired sensation)                      Balance:       Functional Mobility and Transfers for ADLs:  Bed Mobility:  Rolling: Modified independent  Supine to Sit: Modified independent  Sit to Supine: Modified independent  Scooting: Modified independent    Transfers:  Sit to Stand: Contact guard assistance  Stand to Sit: Contact guard assistance  Bed to Chair: Minimum assistance;Assist x2  Toilet Transfer : Minimum assistance;Assist x2  Assistive Device : Walker, rolling(2 person assist due to RLE numbness)    ADL Assessment:  Feeding: Independent    Oral Facial Hygiene/Grooming: Setup    Bathing: Contact guard assistance    Upper Body Dressing: Setup    Lower Body Dressing: Contact guard assistance    Toileting: Contact guard assistance                ADL Intervention and task modifications:          Cognitive Retraining  Safety/Judgement: Fall prevention;Home safety    Bathing: Patient instructed and indicated understanding when bathing to not submerge wound in water, stand to shower or sponge bathe, cover wound with plastic and tape to ensure no water reaches bandage/wound without cues. Dressing joint: Patient instructed and demonstrated understanding to don/doff Right LE first/last with Stand-by assistance.   Patient instructed and demonstrated to don all clothing while sitting prior to standing, doff all clothing to knees while standing, then sit to doff clothing off from knees to feet in order to facilitate fall prevention, pain management, and energy conservation with Stand-by assistance. Dressing joint reach exercise: To increase independence with lower body dressing, patient instructed and demonstrated to reach down Right LE in a seated position slowly to prevent tearing/shearing until slight pull is felt, hold at end range for 10 seconds, then return to starting upright position with Stand by assistance. Patient instructed to complete three sets of three repetitions each daily. Home safety: Patient instructed and indicated understanding on home modifications and safety (raise height of ADL objects, appropriate height of chair surfaces, recliner safety, change of floor surfaces, clear pathways) to increase independence and fall prevention. Standing: Patient instructed and demonstrated during ADLs to walk up to sink/counter top/surfaces, step into walker to increase safety of joint and fall prevention with Minimum assistance and Assist x2. Patient educated about knee anatomy and educated to avoid rotation of Right LE. Instructed to apply concept to ADLs within the home (no twisting of knee during reaching across body, square off while using objects, slide objects along surfaces). Patient instructed and indicated understanding to increase amount of time standing, observe standing position during ADLs in order to increase even weight bearing through bilateral LEs in order to increase independence with ADLs. Goal to be reached 30 days post - op, per orthopedic surgeon or per PT. Tub/shower transfer: Patient instructed and indicated understanding regarding when it is safe to begin transfer into tub/shower (complete stairs with PT, advance exercises with PT high enough to clear tub/shower height).   Patient instructed to use the same technique as used with stairs when entering and exiting tub/shower (\"up with the non-surgical, down with the surgical leg\"). Functional Measure:    Barthel Index:    Bathin  Bladder: 10  Bowels: 10  Groomin  Dressin  Feeding: 10  Mobility: 5  Stairs: 0  Toilet Use: 5  Transfer (Bed to Chair and Back): 10  Total: 60/100        The Barthel ADL Index: Guidelines  1. The index should be used as a record of what a patient does, not as a record of what a patient could do. 2. The main aim is to establish degree of independence from any help, physical or verbal, however minor and for whatever reason. 3. The need for supervision renders the patient not independent. 4. A patient's performance should be established using the best available evidence. Asking the patient, friends/relatives and nurses are the usual sources, but direct observation and common sense are also important. However direct testing is not needed. 5. Usually the patient's performance over the preceding 24-48 hours is important, but occasionally longer periods will be relevant. 6. Middle categories imply that the patient supplies over 50 per cent of the effort. 7. Use of aids to be independent is allowed. Yaya Carmona., Barthel, D.W. ([id number]). Functional evaluation: the Barthel Index. [address] [facility] (14)2. SUMA Quintanilla, Araceli Lechuga, Kendall Lagos, [place], [address] [place] [address] (). Measuring the change indisability after inpatient rehabilitation; comparison of the responsiveness of the Barthel Index and Functional [place] Measure. Journal of Neurology, Neurosurgery, and Psychiatry, 66(4), 602-046. Yasmany Callaway, N.[place].A, JOHN Burgos, & Elio Yap M.A. (2004.) Assessment of post-stroke quality of life in cost-effectiveness studies: The usefulness of the Barthel Index and the EuroQoL-5D.  Quality of Life Research, 15, 205-09             Occupational Therapy Evaluation Charge Determination   History Examination Decision-Making   LOW Complexity : Brief history review  LOW Complexity : 1-3 performance deficits relating to physical, cognitive , or psychosocial skils that result in activity limitations and / or participation restrictions  LOW Complexity : No comorbidities that affect functional and no verbal or physical assistance needed to complete eval tasks       Based on the above components, the patient evaluation is determined to be of the following complexity level: LOW   Pain Ratin/10 R knee    Activity Tolerance:   tolerates ADLs without rest breaks    After treatment patient left in no apparent distress:    Supine in bed, Call bell within reach and Side rails x 3    COMMUNICATION/EDUCATION:   The patients plan of care was discussed with: Registered nurse.      Thank you for this referral.  Sudheer De Jesus, OT

## 2020-12-04 NOTE — ACP (ADVANCE CARE PLANNING)
responded to an in-basket request to assist Mrs. John Littlejohn with an Advance Medical Directive (AMD) on the Post Surgical Ortho unit. Mrs. John Littlejohn was awake, alert, and sitting up in bed when the  came into the room. She made good eye contact and greeted the  warmly.  inquired about the Advanced Medical Directive request ad Mrs. John Littlejohn shared that she would be interested in completing the document.  explained that document to Mrs. John Littlejohn and answered questions as needed. Assisted her in completing the document. She named her son, Jem Reddy as her primary agent and her son, Hallie Strauss, as her secondary agent.  made a copy for Mrs. Derick nayak and handed it to the unit secretary. Returned the original document and several copies to Mrs. John Littlejohn. She thanked the  for assistance and expressed no additional needs at this time.  are available upon further referrall. An Bautista. Peri Shelley.      Paging Service: [phone])

## 2020-12-04 NOTE — PROGRESS NOTES
Patient assisted by to staff members to the [facility] and back to bed. Patient informed staff of a HA when placed back in bed. Heydi Hooker, gave the patient tylenol prior to getting out of bed and informed patient of a future reasesment if her HA.

## 2020-12-04 NOTE — PROGRESS NOTES
Attempted to work with the patient for Physical Therapy, chart reviewed and discussed with nurse cleared for therapy. Patient supine on bed when received check BP 95/52 before activity. Notified nurse agreed to monitor patient. We will continue to follow up with the [patient for therapy thank you.

## 2020-12-04 NOTE — PROGRESS NOTES
Spiritual Care Assessment/Progress Note  [address]      NAME: Eri Gandhi      MRN: [id number]  AGE: 63 y.o.  SEX: female  Zoroastrian Affiliation: Gnosticism   Language: English     12/4/2020     Total Time (in minutes): 41     Spiritual Assessment begun in [facility] POST SURG ORT 1 through conversation with:         [x]Patient        [] Family    [] Friend(s)        Reason for Consult: Advance medical directive consult     Spiritual beliefs: (Please include comment if needed)     [x] Identifies with a janak tradition:         [] Supported by a janak community:            [] Claims no spiritual orientation:           [] Seeking spiritual identity:                [] Adheres to an individual form of spirituality:           [] Not able to assess:                           Identified resources for coping:      [x] Prayer                               [] Music                  [] Guided Imagery     [x] Family/friends                 [] Pet visits     [] Devotional reading                         [] Unknown     [] Other:                                           Interventions offered during this visit: (See comments for more details)    Patient Interventions: Advance medical directive consult, Affirmation of emotions/emotional suffering, Catharsis/review of pertinent events in supportive environment           Plan of Care:     [] Support spiritual and/or cultural needs    [x] Support AMD and/or advance care planning process      [] Support grieving process   [] Coordinate Rites and/or Rituals    [] Coordination with community clergy   [] No spiritual needs identified at this time   [] Detailed Plan of Care below (See Comments)  [] Make referral to Music Therapy  [] Make referral to Pet Therapy     [] Make referral to Addiction services  [] Make referral to [facility]  [] Make referral to Spiritual Care Partner  [] No future visits requested        [x] Follow up upon further referrals Comments:     responded to an in-basket request to assist Mrs. Christopher Chaudhry with an Advance Medical Directive (AMD) on the Post Surgical Ortho unit. Mrs. Christopher Chaudhry was awake, alert, and sitting up in bed when the  came into the room. She made good eye contact and greeted the  warmly.  inquired about the Advanced Medical Directive request ad Mrs. Christopher Chaudhry shared that she would be interested in completing the document.  explained that document to Mrs. Christopher Chaudhry and answered questions as needed. Assisted her in completing the document. She named her son, Lela Cohen as her primary agent and her son, Sai Carbajal, as her secondary agent.  made a copy for Mrs. Derick nayak and handed it to the unit secretary. Returned the original document and several copies to Mrs. Christopher Chaudhry.  continued to be a supportive presence as Mrs. Christopher Chaudhry discussed her recent surgery and her recovery process. She shared that she is in good spirits and discussed the small measures of progress that she has noticed for herself already. Angie is very important to her, sharing she is relying on the Lord's guidance during this time.  continued to be a supportive presence as Mrs. Christopher Chaudhry talked about difficult challenges she had faced over the past few years and her hopes for herself. She thanked the  for her assistance and expressed no additional needs at this time.  are available upon further referrall. An Bautista. Milind Sinclair.      Paging Service: [phone])

## 2020-12-04 NOTE — PROGRESS NOTES
Contacted Physician regarding low Blood Pressure. Natacha or Facility: [facility] From: Clive Anders RE: Jared Cover 1955 RM: [id number] Patient blood pressure has been trending low. Last BP was 109/50. 500 mL bolus initiated. Patient hemoglobin 7.9. Patient was requesting pain medication. Currently taking 5 mg Oxycodone. Wanted to update you on the situation and possibly consider changing the patient pain medication due to her BP.  Need Callback: NO CALLBACK REQ 4 ORTHO/SURG TELE

## 2020-12-04 NOTE — PROGRESS NOTES
Patient reminded to notify staff when toileting is needed. Patient is a two assist to the [facility]. Call bell is in reach, bed alarm is on, and the bed is in the lowest position.

## 2020-12-04 NOTE — PROGRESS NOTES
TOTAL KNEE ARTHROPLASTY DAILY NOTE     ASSESSMENT / PLAN :   1. Pain Control : Acceptable - Some pain at times, but the patient does not wish to increase their medications. Some hypotension with Oxycodone, will trial hydrocodone. 2. Overnight Issues : none  3. Wound or incisional issue : Healing incision with no visible drainage  4. Therapy / Weight Bearing Recommendations : Weight bear as tolerated with use of a walker and two person assist while mobilizing  5. DVT Prophylaxis : Mechanical and Aspirin and mechanical lower extremity compression device  6. Disposition : Discharge to home with home health (maximum of 4 visits)  7. Medical Concerns : stable  8. Comments : none     POD  1 Day Post-Op s/p Procedure(s):  RIGHT TOTAL KNEE ARTHROPLASTY MAKOPLASTY     SUBJECTIVE :     Overnight complaints : none. OBJECTIVE :     Vitals:    12/04/20 0844 12/04/20 0852 12/04/20 0907 12/04/20 0918   BP: (!) 90/54 (!) 92/51 (!) 86/48 (!) 100/59   Pulse: (!) 54 (!) 51 64 (!) 49   Resp:       Temp:       SpO2:       Weight:       Height:           Alert and oriented x3. Examination of the right knee reveals that the dressing is clean, dry and intact. Motor Exam : Pt is able to perform a straight leg raise. Sensation is intact to light touch. No calf pain. MEDS / LABS :     Key Anti-Platelet Anticoagulant Meds             aspirin delayed-release 81 mg tablet (Taking) Take 1 Tab by mouth two (2) times a day.            Labs:  Recent Labs     12/04/20  0440   HGB 7.9*      K 3.8      CO2 24   BUN 18   CREA 0.68   GLU 80      Patient mobility  Gait  Gait Abnormalities: Step to gait, Decreased step clearance  Ambulation - Level of Assistance: Assist x2, Minimal assistance  Distance (ft): 4 Feet (ft)  Assistive Device: Gait belt, Walker, rolling        Saw Jean-Baptiste MD  Cell [phone]  Pelon Aden PA-C  Cell [phone]  Medical Assistants: [id number] [phone]                 Surgery Scheduler: COREY Soto [phone] ext [phone]

## 2020-12-04 NOTE — PROGRESS NOTES
Transition Plan of Care  RUR OBS      Plan:  1. Monitor patient's response to treatment. 2. Patient home with OT services. 3. [facility] to transport at discharge. 4. Case Management to follow.         WILVER Vizcarra

## 2020-12-05 ENCOUNTER — APPOINTMENT (OUTPATIENT)
Dept: CT IMAGING | Age: 65
DRG: 302 | End: 2020-12-05
Attending: ORTHOPAEDIC SURGERY
Payer: COMMERCIAL

## 2020-12-05 PROBLEM — G47.00 INSOMNIA: Status: ACTIVE | Noted: 2020-12-05

## 2020-12-05 LAB
ARTERIAL PATENCY WRIST A: YES
BASE DEFICIT BLDA-SCNC: 2.6 MMOL/L
BDY SITE: ABNORMAL
FIO2 ON VENT: 21 %
GAS FLOW.O2 O2 DELIVERY SYS: 0 L/MIN
GLUCOSE BLD STRIP.AUTO-MCNC: 83 MG/DL (ref 65–100)
HCO3 BLDA-SCNC: 23 MMOL/L (ref 22–26)
PCO2 BLDA: 41 MMHG (ref 35–45)
PH BLDA: 7.36 [PH] (ref 7.35–7.45)
PO2 BLDA: 72 MMHG (ref 80–100)
SAO2 % BLD: 94 % (ref 92–97)
SAO2% DEVICE SAO2% SENSOR NAME: ABNORMAL
SERVICE CMNT-IMP: NORMAL
SPECIMEN SITE: ABNORMAL

## 2020-12-05 PROCEDURE — 97530 THERAPEUTIC ACTIVITIES: CPT

## 2020-12-05 PROCEDURE — 82803 BLOOD GASES ANY COMBINATION: CPT

## 2020-12-05 PROCEDURE — 36600 WITHDRAWAL OF ARTERIAL BLOOD: CPT

## 2020-12-05 PROCEDURE — 70450 CT HEAD/BRAIN W/O DYE: CPT

## 2020-12-05 PROCEDURE — 82962 GLUCOSE BLOOD TEST: CPT

## 2020-12-05 PROCEDURE — 99218 HC RM OBSERVATION: CPT

## 2020-12-05 PROCEDURE — 99222 1ST HOSP IP/OBS MODERATE 55: CPT | Performed by: FAMILY MEDICINE

## 2020-12-05 PROCEDURE — 65270000029 HC RM PRIVATE

## 2020-12-05 PROCEDURE — 97116 GAIT TRAINING THERAPY: CPT

## 2020-12-05 PROCEDURE — 74011250637 HC RX REV CODE- 250/637: Performed by: ORTHOPAEDIC SURGERY

## 2020-12-05 PROCEDURE — 74011250637 HC RX REV CODE- 250/637: Performed by: PHYSICIAN ASSISTANT

## 2020-12-05 RX ADMIN — HYDROMORPHONE HYDROCHLORIDE 2 MG: 2 TABLET ORAL at 22:53

## 2020-12-05 RX ADMIN — POLYETHYLENE GLYCOL 3350 17 G: 17 POWDER, FOR SOLUTION ORAL at 08:08

## 2020-12-05 RX ADMIN — HYDROMORPHONE HYDROCHLORIDE 4 MG: 2 TABLET ORAL at 04:22

## 2020-12-05 RX ADMIN — HYDROMORPHONE HYDROCHLORIDE 2 MG: 2 TABLET ORAL at 18:53

## 2020-12-05 RX ADMIN — ACETAMINOPHEN 650 MG: 325 TABLET ORAL at 04:22

## 2020-12-05 RX ADMIN — TRAZODONE HYDROCHLORIDE 100 MG: 100 TABLET ORAL at 22:53

## 2020-12-05 RX ADMIN — ACETAMINOPHEN 650 MG: 325 TABLET ORAL at 17:17

## 2020-12-05 RX ADMIN — DOCUSATE SODIUM 50MG AND SENNOSIDES 8.6MG 1 TABLET: 8.6; 5 TABLET, FILM COATED ORAL at 17:17

## 2020-12-05 RX ADMIN — Medication 81 MG: at 17:16

## 2020-12-05 RX ADMIN — FAMOTIDINE 20 MG: 20 TABLET, FILM COATED ORAL at 08:08

## 2020-12-05 RX ADMIN — Medication 10 ML: at 22:53

## 2020-12-05 RX ADMIN — FAMOTIDINE 20 MG: 20 TABLET, FILM COATED ORAL at 17:17

## 2020-12-05 RX ADMIN — ACETAMINOPHEN 650 MG: 325 TABLET ORAL at 12:03

## 2020-12-05 RX ADMIN — Medication 81 MG: at 08:08

## 2020-12-05 RX ADMIN — ACETAMINOPHEN 650 MG: 325 TABLET ORAL at 23:00

## 2020-12-05 RX ADMIN — Medication 10 ML: at 04:22

## 2020-12-05 RX ADMIN — HYDROMORPHONE HYDROCHLORIDE 2 MG: 2 TABLET ORAL at 14:19

## 2020-12-05 RX ADMIN — DOCUSATE SODIUM 50MG AND SENNOSIDES 8.6MG 1 TABLET: 8.6; 5 TABLET, FILM COATED ORAL at 08:09

## 2020-12-05 RX ADMIN — ESCITALOPRAM OXALATE 20 MG: 10 TABLET ORAL at 22:52

## 2020-12-05 RX ADMIN — Medication 10 ML: at 12:03

## 2020-12-05 NOTE — PROGRESS NOTES
Responded to code stroke, but noted that this patient is followed by [facility]. [facility] physician also responded to code. Patient admitted with knee replacement. Post op has gotten 10mg PO dilaudid over 18hr. Per MAR, patient is narcotic naive. Patient has no focal symptoms on my exam, and is AAOx3. Nursing notes that earlier she falls asleep easily and has garbled speech around those moments.   I will leave further care in the hands of FP group

## 2020-12-05 NOTE — PROGRESS NOTES
Problem: Mobility Impaired (Adult and Pediatric)  Goal: *Acute Goals and Plan of Care (Insert Text)  Description: FUNCTIONAL STATUS PRIOR TO ADMISSION: Patient was independent and active without use of DME.    HOME SUPPORT PRIOR TO ADMISSION: The patient lived with  and niece but did not require assist. 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 He will go into respite care for her recovery and her niece will be there to assist her. Physical Therapy Goals  Initiated 12/3/2020    1. Patient will move from supine to sit and sit to supine  in bed with independence within 4 days. 2. Patient will perform sit to stand with modified independence within 4 days. 3. Patient will ambulate with modified independence for 100 feet with the least restrictive device within 4 days. 4. Patient will ascend/descend 4 stairs with 1 handrail(s) with minimal assistance/contact guard assist within 4 days. 5. Patient will perform home exercise program per protocol with independence within 4 days. 6. Patient will demonstrate AROM 0-90 degrees in operative joint within 4 days. Outcome: Progressing Towards Goal  PHYSICAL THERAPY TREATMENT  Patient: Jair Vasquez (85 y.o. female)  Date: 12/5/2020  Diagnosis: Primary osteoarthritis of right knee [M17.11]  Primary osteoarthritis of right knee [M17.11]  Primary osteoarthritis of right knee [M17.11]   <principal problem not specified>  Procedure(s) (LRB):  RIGHT TOTAL KNEE ARTHROPLASTY MAKOPLASTY (Right) 2 Days Post-Op  Precautions: WBAT, Fall  Chart, physical therapy assessment, plan of care and goals were reviewed. ASSESSMENT  Patient continues with skilled PT services and is slowly progressing towards goals. She is POD #2 for right TKA. Patient now cleared for PT to see after an eventful morning of drowsiness and non-sensical talking. Nursing called RRT and then Code S and patient was seen by teleneurology.  Her CT of head was negative for acute process and earlier confusion thought to be due to hospital delirium or metabolic encephalopathy. Patient currently alert and oriented with PT and able to follow all commands, though her tolerance to PT is limited due to 8/10 pain to her knee. Notified nursing for pain medicine at patient's request.     Current Level of Function Impacting Discharge (mobility/balance): Patient was able to perform bed exercises with min assist. She has good SLR on operative leg. Patient stood and ambulated to bedside commode, then back to bed with rolling walker and CGA of 2. Patient unwilling to ambulate further or sit in chair due to pain. Returned to bed with RLE elevated and ice in place. Vitals stable. 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 PLAN :  Patient continues to benefit from skilled intervention to address the above impairments. Continue treatment per established plan of care. to address goals. Recommendation for discharge: (in order for the patient to meet his/her long term goals)  Outpatient physical therapy follow up recommended for TKA    This discharge recommendation:  Has not yet been discussed the attending provider and/or case management    IF patient discharges home will need the following DME: patient owns DME required for discharge       SUBJECTIVE:   Patient stated I think I have a UTI.    OBJECTIVE DATA SUMMARY:   Critical Behavior:  Neurologic State: Drowsy, Eyes open to stimulus  Orientation Level: Oriented to person, Oriented to place, Disoriented to situation, Disoriented to time  Cognition: Follows commands, Poor safety awareness  Safety/Judgement: Fall prevention, Home safety    Range of Motion:           RLE AROM  R Knee Flexion: 65  R Knee Extension: 10                Functional Mobility Training:  Bed Mobility:     Supine to Sit: Contact guard assistance; Additional time  Sit to Supine: Minimum assistance  Scooting: Stand-by assistance        Transfers:  Sit to Stand: Assist x2;Contact guard assistance  Stand to Sit: Contact guard assistance                             Balance:  Sitting: Intact  Standing: Intact; With support  Ambulation/Gait Training:  Distance (ft): 3 Feet (ft)(x2)  Assistive Device: Gait belt;Walker, rolling  Ambulation - Level of Assistance: Assist x2;Contact guard assistance        Gait Abnormalities: Step to gait; Decreased step clearance  Right Side Weight Bearing: As tolerated                                        Therapeutic Exercises:     EXERCISE   Sets   Reps   Active Active Assist   Passive Self ROM   Comments   Ankle Pumps   [x]                                        []                                        []                                        []                                           Quad Sets   [x]                                        []                                        []                                        []                                           Hamstring Sets   [x]                                        []                                        []                                        []                                           Short Arc Quads   [x]                                        []                                        []                                        []                                           Knee Extension Stretch     []                                          []                                          []                                          []                                           Heel Slides   [x]                                        []                                        []                                        []                                           Long Arc Quads   []                                        []                                        [] []                                           Knee Flexion Stretch   []                                        []                                        []                                        []                                           Straight Leg Raises   [x]                                        []                                        []                                        []                                               Pain Ratin/10 to right knee    Activity Tolerance:   Fair    After treatment patient left in no apparent distress:   Supine in bed, Call bell within reach, Bed / chair alarm activated, and Side rails x 3    COMMUNICATION/COLLABORATION:   The patients plan of care was discussed with: Registered nurse.      Gil Jackson, PT   Time Calculation: 30 mins

## 2020-12-05 NOTE — PROGRESS NOTES
Call from Dr Mariah Neal (hospitalist): Code Stroke called on this patient by Ksenia Barcenas RN, who follows with Dr Michael Jacinto outpatient. Patient was drowsy and her speech was nonsensical.     Patient seen and assessed at the bedside on her way out to the CT:     Patient states she is confused. A&O x3, no focal deficits, answers all questions appropriately but is mildly slurred, no facial droop, upper or lower extremity weakness or changes in sensation. BP (!) 108/59   Pulse 62   Temp 97.9 °F (36.6 °C)   Resp 16   Ht 5' 1\" (1.549 m)   Wt 143 lb (64.9 kg)   SpO2 91%   BMI 27.02 kg/m²     Assessment:   Patient is POD1 s/p R total knee arthroplasty. She is treated with Dilaudid for pain, last dose of 4 mg was at 4 am. Patient is HDS, VSS. Plan:  STAT CT head now   Tele-neuro consult after   Transfer to tele with cardiac monitoring  Neuro check q4h  NPO until passes bedside swallow     Consider narcan dose when is back from CT and reassess mentation.      Dudley Polo MD

## 2020-12-05 NOTE — PROGRESS NOTES
responded to a Code Stroke for Mrs. Almita Sanders on the [address] [place] [address] Surgical Ortho room. Numerous providers were assessing her at the time of the 's visit. 's are available for further referrals. An Bautista. Brandon Castanon.      Paging Service: [phone]

## 2020-12-05 NOTE — CONSULTS
[address] [place] [address]   Office [phone]  Fax [phone]       Initial Consult Note     Name: Justin Madrid MRN: [id number]  Sex: female    YOB: 1955  Age: 63 y.o. PCP: Julian Ruiz MD     Date of admission:    12/3/2020  Date of consultation:   12/5/2020  Requesting physician:   Dr Madison Colon  Reason for consultation:   Code Stroke    History of present illness  Justin Madrid is a 63 y.o. female with PMH of EDITH/MDD, OA, HTN, Asthma PONV and adverse effects of narcotics, causing hallucinations who is POD 2 s/p R total knee arthroplasty. Patient states she is doing well, but has had some and and was receiving pain medications. She woke up this morning and found herself confused, and when seen by the nurse was noted to have some difficulties answering questions. Code stroke was called and [facility] team was called to assist with further management of the event. Denies fever, chills, chest pain, shortness of breath, numbness, increased headaches, sore throat, eat pain, cough, sick contacts, n/v, abd pain, dysuria, hematuria, slurred speech, facial droop or unilateral weakness. Patient denies history of stroke or MI in the past. She follows with Dr Irene Welch outpatient for chronic conditions that are well controlled. Review of Systems  Review of Systems   Constitutional: Negative for chills, fatigue and fever. HENT: Negative for facial swelling and sore throat. Eyes: Negative for photophobia, discharge and visual disturbance. Respiratory: Negative for cough, choking and shortness of breath. Cardiovascular: Negative for chest pain and palpitations. Gastrointestinal: Negative for abdominal distention and nausea. Genitourinary: Negative for difficulty urinating. Musculoskeletal: Negative for neck pain and neck stiffness.    Neurological: Negative for dizziness, tremors, seizures, syncope, facial asymmetry, speech difficulty, weakness, light-headedness, numbness 64M hx of schizophrenia, seizure, presents from [facility] with a cc of agitation per EMS and transfer note pt had episode today where got agitated with house staff, got haldol prior to ED arrival. In ED, pt calm and cooperative, feels well though feels as if "the virus is taking him", cannot give clear history as to what happened earlier today, noted "food was bad and the staff was talking about it". Otherwise is without complaints. Denies pain. Denies n/v/f/c/cp/sob. Denies headache, syncope, lightheadedness, dizziness. Denies chest palpitations, abdominal pain. Denies dysuria, hematuria, hematochezia, BRBPR, tarry stools, diarrhea, constipation. Poor historian. and headaches. Psychiatric/Behavioral: Positive for confusion. Negative for agitation and sleep disturbance. Home Medications   Prior to Admission medications    Medication Sig Start Date End Date Taking? Authorizing Provider   aspirin delayed-release 81 mg tablet Take 1 Tab by mouth two (2) times a day. 12/3/20  Yes Shaun Burks MD   ibuprofen (MOTRIN) 800 mg tablet Take 1 Tab by mouth every six (6) hours as needed for Pain. 12/3/20  Yes Shaun Burks MD   pregabalin (Lyrica) 75 mg capsule Take 1 Cap by mouth daily (with dinner). Max Daily Amount: 75 mg. 12/3/20  Yes Shaun Burks MD   oxyCODONE IR (ROXICODONE) 5 mg immediate release tablet Take 1 Tab by mouth every four (4) hours as needed for Pain for up to 7 days. Max Daily Amount: 30 mg. Indications: pain 12/3/20 12/10/20 Yes Shaun Burks MD   traMADoL Christ Courtney) 50 mg tablet Take 1 Tab by mouth every six (6) hours as needed for Pain for up to 7 days. Max Daily Amount: 200 mg. 12/3/20 12/10/20 Yes Shaun Burks MD   acetaminophen (Acetaminophen Pain Relief) 500 mg tablet Take 2 Tabs by mouth every six (6) hours as needed for Pain. 12/3/20  Yes Shaun Burks MD   ondansetron (ZOFRAN ODT) 8 mg disintegrating tablet Take 0.5 Tabs by mouth every eight (8) hours as needed for Nausea. 12/3/20  Yes Shaun Burks MD   traZODone (DESYREL) 50 mg tablet Take 100 mg by mouth nightly. Yes Provider, Historical   acetaminophen (Tylenol Extra Strength) 500 mg tablet Take 1,000 mg by mouth every six (6) hours as needed for Pain. Yes Provider, Historical   busPIRone (BUSPAR) 7.5 mg tablet Take 1 Tab by mouth two (2) times daily as needed (anxiety).  10/23/20  Yes Bryant Nichols NP   escitalopram oxalate (LEXAPRO) 20 mg tablet TAKE 1 TABLET BY MOUTH ONCE DAILY 8/13/20  Yes Bryant Nichols NP       Allergies   Allergies   Allergen Reactions    Nsaids (Non-Steroidal Anti-Inflammatory Drug) Other (comments)     Gastric bypass       Past Medical History   Past Medical History:   Diagnosis Date    Adverse effect of other narcotics, sequela     Most pills cause hallucination and nausea    Anxiety     Asthma     Benign heart murmur     Difficult intubation 2012    Patient states she has a small mouth    Hypertension     resolved with weight loss    Hypoglycemia 10/2020    Hypothyroidism     Iron deficiency anemia     Osteoarthritis     PONV (postoperative nausea and vomiting)     Psychiatric disorder     anxiety, depression    Snoring     Syncopal episodes     Post Gastric bypass- food helps    Urinary incontinence        Past Surgical History  Past Surgical History:   Procedure Laterality Date    HX CATARACT REMOVAL Bilateral  &     HX CERVICAL FUSION      C2-7    HX  SECTION  X2    HX CHOLECYSTECTOMY  2018    HX GASTRIC BYPASS      HX HYSTERECTOMY  1992    HX KNEE ARTHROSCOPY Right 2008    HX TONSILLECTOMY      HX WISDOM TEETH EXTRACTION         Family History  Family History   Problem Relation Age of Onset    Heart Disease Mother     Post-op Nausea/Vomiting Mother     Cancer Father         PROSTATE TO SPINE    Clotting Disorder Neg Hx        Social History   Living arrangements: patient lives with their family. Ambulates: Independently     Alcohol history: Never    Smoking history: former smoker, quit 1 years ago    Illicit drug history: no history of illicit drug use    Physical Exam  Objective:  General Appearance:  Comfortable, in no acute distress and not in pain. Vital signs: (most recent): Blood pressure 115/69, pulse 60, temperature 98.7 °F (37.1 °C), resp. rate 18, height 5' 1\" (1.549 m), weight 143 lb (64.9 kg), SpO2 94 %. No fever. Output: Producing urine. HEENT: Normal HEENT exam.    Lungs:  Normal effort and normal respiratory rate. Breath sounds clear to auscultation. She is not in respiratory distress. Heart: Normal rate. Regular rhythm.   S1 normal.    Chest: Symmetric chest wall expansion. Abdomen: Abdomen is soft and non-distended. Bowel sounds are normal.     Pulses: Distal pulses are intact. Neurological: Patient is alert and oriented to person, place and time. Normal strength. Patient has normal reflexes, normal muscle tone and normal coordination. (CN 2-12 grossly intact. Hand  5/5 BL. ). Skin:  Warm and dry. O2 Flow Rate (L/min): 2 l/min O2 Device: Room air     Laboratory Data  No results found for this or any previous visit (from the past 8 hour(s)). Imaging  CXR Results  (Last 48 hours)    None        CT Results  (Last 48 hours)               12/05/20 1059  CT CODE NEURO HEAD WO CONTRAST Final result    Impression:  IMPRESSION:    No evidence of acute process. Narrative:  EXAM: CT CODE NEURO HEAD WO CONTRAST       INDICATION: Altered mental status; slurred speech       COMPARISON: None. CONTRAST: None. TECHNIQUE: Unenhanced CT of the head was performed using 5 mm images. Brain and   bone windows were generated. Coronal and sagittal reformats. CT dose reduction   was achieved through use of a standardized protocol tailored for this   examination and automatic exposure control for dose modulation. FINDINGS:   The ventricles and sulci are normal in size, shape and configuration. . There is   a chronic lacunar infarct in the right caudate head. . There is no intracranial   hemorrhage, extra-axial collection, or mass effect. The basilar cisterns are   open. No CT evidence of acute infarct. The bone windows demonstrate no abnormalities. The visualized portions of the   paranasal sinuses and mastoid air cells are clear. EKG:  preop EKG 11/18 with NSR. Impression / Recommendations       Irving Dakins is a 63 y.o. female  with PMH of EDITH/MDD, OA, HTN, Asthma PONV and adverse effects of narcotics, causing hallucinations who is POD 2 s/p R total knee arthroplasty.  The Family Medicine Service was consulted for Code Stroke management. S/p TKA POD 2:   - Management per promary    Confusion: likely 2/2 post op delirium vs opioid induced encephalopathy. Code stroke called by nursing, CT negative. NIHHS 1. Patient evaluated by tele-neuro who favors hospital delirium vs metabolic encephalopathy.   - Plain CT negative  - Tele neuro recommends MRI, this was ordered  - Neuro checks q4h  - ADAT once passed PO  - Cardiac monitoring   - Permissive HTN  - Discussed with primary team: patient is at baseline mentation, compared to his assessment from yesterday, Dr Rebekah Bolden limits dilaudid to 2 mg  - Will consider MRI if mentation worsens or develops neurological deficits, otherwise will be safe for discharge     Anemia: Normocytic. BL 10-11. Last iron profile in 2015 wnl  - Post op Hgb 7.9  - Consider starting on iron TID with bowel regimen on discharge   - Consider anemia work up outpatient     HTN: no home meds   - consider adding PRN medication if above 160/100 after 24 since onset on confusion   - follow up outpatient    Anxiety: stable   - Home lexapro and prn buspar restarted by primary     Insomnia: stable   - Home trazodone restarted by primary    Hx of Hypothyroidism: last TSH 0.7 no home meds  - follow up outpatient     Asthma: stable, no meds. FEN/GI - Per primary team.    Activity - Per primary team  DVT prophylaxis - Per primary team  GI prophylaxis - Not indicated at this time  Disposition - Plan to d/c to Per primary team.  Code Status - Full, discussed with patient / caregivers. Thank you very much for allowing us to participate in the care of this pleasant patient. The family medicine service will continue to follow the patient's medical progress along with you. Please do not hesitate to page with any questions or to discuss the case (Team phone [phone]).     Patient will be discussed with Dr. Peter Santos    Signed by:     Jessica Helms MD  Family Medicine Resident        For Billing    No chief complaint on file. Hospital Problems  Date Reviewed: 9/15/2020          Codes Class Noted POA    Insomnia ICD-10-CM: G47.00  ICD-9-CM: 780.52  12/5/2020 Unknown        Primary osteoarthritis of right knee ICD-10-CM: M17.11  ICD-9-CM: 715.16  12/3/2020 Unknown        Anemia ICD-10-CM: D64.9  ICD-9-CM: 285.9  8/25/2020 Yes        Hypothyroid ICD-10-CM: E03.9  ICD-9-CM: 244.9  2/25/2011 Yes        Osteoarthritis ICD-10-CM: M19.90  ICD-9-CM: 715.90  12/8/2010 Yes        Asthma ICD-10-CM: J45.909  ICD-9-CM: 493.90  12/8/2010 Yes        HTN (hypertension), malignant ICD-10-CM: I10  ICD-9-CM: 401.0  11/24/2010 Yes               [address] Medicine Residency Attending Addendum:  I saw and evaluated the patient on the day of the encounter with Dr. John Dumas, performing the key elements of the service. I discussed the findings, assessment and plan with the resident and agree with the resident's findings and plan as documented in the resident's note. Patient back to baseline. May be from overmedication. Pending MRI.      Cassandra Fay MD, FAAFP, CAQSM, RMSK

## 2020-12-05 NOTE — PROGRESS NOTES
TOTAL KNEE ARTHROPLASTY DAILY NOTE     ASSESSMENT / PLAN :   1. Pain Control : Acceptable - Some pain at times, but the patient does not wish to increase their medications  2. Overnight Issues : RR called this am for somnolence. Resolved. 3. Wound or incisional issue : Healing incision with no visible drainage  4. Therapy / Weight Bearing Recommendations : Weight bear as tolerated with use of a walker and two person assist while mobilizing  5. DVT Prophylaxis : Mechanical and Aspirin and mechanical lower extremity compression device  6. Disposition : Discharge to home with home health (maximum of 4 visits), likely on Sunday am for logistical reasons. 7. Medical Concerns : Stable, possible CVA with marked improvement in mentation. This is likely opioid related delerium. Will limit to Dilaudid 2mg for now. Discussed wit FP and will defer to them if further w/u is necessary at this time prior to discharge. MS presently has improved to baseline. 8. Comments : Stable, continued to be drowsy. POD  2 Days Post-Op s/p Procedure(s):  RIGHT TOTAL KNEE ARTHROPLASTY MAKOPLASTY     SUBJECTIVE :     Overnight complaints : Stable. OBJECTIVE :     Vitals:    12/05/20 0806 12/05/20 0935 12/05/20 0956 12/05/20 1144   BP: (!) 95/49 (!) 107/53 (!) 108/59 115/69   Pulse: (!) 56 (!) 56 62 60   Resp: 18 16 16 18   Temp: 97.9 °F (36.6 °C)   98.7 °F (37.1 °C)   SpO2: 93% 92% 91% 94%   Weight:       Height:           Alert and oriented x3. Examination of the right knee reveals that the dressing is clean, dry and intact. Motor Exam : Pt is able to perform a straight leg raise. Sensation is intact to light touch. No calf pain. MEDS / LABS :     Key Anti-Platelet Anticoagulant Meds             aspirin delayed-release 81 mg tablet (Taking) Take 1 Tab by mouth two (2) times a day.            Labs:  Recent Labs     12/04/20  0440   HGB 7.9*      K 3.8      CO2 24   BUN 18   CREA 0.68   GLU 80 Patient mobility  Gait  Gait Abnormalities: Step to gait, Decreased step clearance  Ambulation - Level of Assistance: Assist x2, Minimal assistance  Distance (ft): 4 Feet (ft)  Assistive Device: Gait belt, Walker, Dyan Vasquez MD  Cell [phone]  Hao Molina PA-C  Cell [phone]  Medical Assistants: Kimi Nolan [phone]                 Surgery Scheduler: COREY Maldonado [phone] ext [phone]

## 2020-12-05 NOTE — PROGRESS NOTES
responded to a Code RRT for Mrs. Graciela Bernstein on the Post Surgical Ortho unit. When  arrived to the unit, Mrs. Clayton's door was closed. Consulted with nursing staff who shared that numerous people are working with her at this time. 's are available upon further referral.   Orly Summers. Evalee Rubinstein.      Paging Service: [phone]

## 2020-12-05 NOTE — PROGRESS NOTES
1000-Responded to RRT at this time for patient with altered mental status. Received narcotics overnight and has been relatively sedated/drowsy since. Responds appropriately to commands and speech is mostly coherent but she does have garbled/nonsensical speech at times. She is markedly drowsy and falls asleep when stimulation is decreased at all. Vitals are stable. There is no unilateral weakness. No facial droop. Eyes do not deviate. ABG ordered to rule out CO2 retention; O2 sats are borderline low. Pt may need narcotic reversal with narcan; RN advised to notify attending MD of current situation and obtain further orders to include possible narcan administration vs. CT of head for AMS. Advised that I am available if needed for any further issues or concerns.  Iraida RAYA

## 2020-12-06 ENCOUNTER — HOSPITAL ENCOUNTER (OUTPATIENT)
Dept: MRI IMAGING | Age: 65
Discharge: HOME | End: 2020-12-06
Attending: FAMILY MEDICINE

## 2020-12-06 PROBLEM — R41.0 CONFUSION: Status: ACTIVE | Noted: 2020-12-06

## 2020-12-06 LAB
ANION GAP SERPL CALC-SCNC: 4 MMOL/L (ref 5–15)
BUN SERPL-MCNC: 12 MG/DL (ref 6–20)
BUN/CREAT SERPL: 27 (ref 12–20)
CALCIUM SERPL-MCNC: 8.1 MG/DL (ref 8.5–10.1)
CHLORIDE SERPL-SCNC: 110 MMOL/L (ref 97–108)
CHOLEST SERPL-MCNC: 120 MG/DL
CO2 SERPL-SCNC: 27 MMOL/L (ref 21–32)
CREAT SERPL-MCNC: 0.44 MG/DL (ref 0.55–1.02)
ERYTHROCYTE [DISTWIDTH] IN BLOOD BY AUTOMATED COUNT: 15.1 % (ref 11.5–14.5)
GLUCOSE SERPL-MCNC: 90 MG/DL (ref 65–100)
HCT VFR BLD AUTO: 24.9 % (ref 35–47)
HDLC SERPL-MCNC: 71 MG/DL
HDLC SERPL: 1.7 {RATIO} (ref 0–5)
HGB BLD-MCNC: 7.9 G/DL (ref 11.5–16)
LDLC SERPL CALC-MCNC: 38.8 MG/DL (ref 0–100)
LIPID PROFILE,FLP: NORMAL
MCH RBC QN AUTO: 27.5 PG (ref 26–34)
MCHC RBC AUTO-ENTMCNC: 31.7 G/DL (ref 30–36.5)
MCV RBC AUTO: 86.8 FL (ref 80–99)
NRBC # BLD: 0 K/UL (ref 0–0.01)
NRBC BLD-RTO: 0 PER 100 WBC
PLATELET # BLD AUTO: 209 K/UL (ref 150–400)
PMV BLD AUTO: 12.4 FL (ref 8.9–12.9)
POTASSIUM SERPL-SCNC: 4 MMOL/L (ref 3.5–5.1)
RBC # BLD AUTO: 2.87 M/UL (ref 3.8–5.2)
SODIUM SERPL-SCNC: 141 MMOL/L (ref 136–145)
TRIGL SERPL-MCNC: 51 MG/DL (ref ?–150)
VLDLC SERPL CALC-MCNC: 10.2 MG/DL
WBC # BLD AUTO: 5.6 K/UL (ref 3.6–11)

## 2020-12-06 PROCEDURE — 99232 SBSQ HOSP IP/OBS MODERATE 35: CPT | Performed by: FAMILY MEDICINE

## 2020-12-06 PROCEDURE — 85027 COMPLETE CBC AUTOMATED: CPT

## 2020-12-06 PROCEDURE — 97116 GAIT TRAINING THERAPY: CPT

## 2020-12-06 PROCEDURE — 65270000029 HC RM PRIVATE

## 2020-12-06 PROCEDURE — 74011250637 HC RX REV CODE- 250/637: Performed by: ORTHOPAEDIC SURGERY

## 2020-12-06 PROCEDURE — 74011250637 HC RX REV CODE- 250/637: Performed by: PHYSICIAN ASSISTANT

## 2020-12-06 PROCEDURE — 97530 THERAPEUTIC ACTIVITIES: CPT

## 2020-12-06 PROCEDURE — 97168 OT RE-EVAL EST PLAN CARE: CPT | Performed by: OCCUPATIONAL THERAPIST

## 2020-12-06 PROCEDURE — 74011250637 HC RX REV CODE- 250/637: Performed by: FAMILY MEDICINE

## 2020-12-06 PROCEDURE — 80061 LIPID PANEL: CPT

## 2020-12-06 PROCEDURE — 36415 COLL VENOUS BLD VENIPUNCTURE: CPT

## 2020-12-06 PROCEDURE — 80048 BASIC METABOLIC PNL TOTAL CA: CPT

## 2020-12-06 PROCEDURE — 99218 HC RM OBSERVATION: CPT

## 2020-12-06 PROCEDURE — 97535 SELF CARE MNGMENT TRAINING: CPT | Performed by: OCCUPATIONAL THERAPIST

## 2020-12-06 RX ORDER — LANOLIN ALCOHOL/MO/W.PET/CERES
1 CREAM (GRAM) TOPICAL 2 TIMES DAILY WITH MEALS
Status: DISCONTINUED | OUTPATIENT
Start: 2020-12-06 | End: 2020-12-07 | Stop reason: HOSPADM

## 2020-12-06 RX ADMIN — BUSPIRONE HYDROCHLORIDE 7.5 MG: 5 TABLET ORAL at 15:35

## 2020-12-06 RX ADMIN — HYDROMORPHONE HYDROCHLORIDE 2 MG: 2 TABLET ORAL at 16:59

## 2020-12-06 RX ADMIN — FAMOTIDINE 20 MG: 20 TABLET, FILM COATED ORAL at 08:17

## 2020-12-06 RX ADMIN — FERROUS SULFATE TAB 325 MG (65 MG ELEMENTAL FE) 325 MG: 325 (65 FE) TAB at 16:58

## 2020-12-06 RX ADMIN — HYDROMORPHONE HYDROCHLORIDE 2 MG: 2 TABLET ORAL at 12:51

## 2020-12-06 RX ADMIN — Medication 10 ML: at 20:57

## 2020-12-06 RX ADMIN — Medication 10 ML: at 05:27

## 2020-12-06 RX ADMIN — HYDROMORPHONE HYDROCHLORIDE 2 MG: 2 TABLET ORAL at 08:17

## 2020-12-06 RX ADMIN — POLYETHYLENE GLYCOL 3350 17 G: 17 POWDER, FOR SOLUTION ORAL at 08:17

## 2020-12-06 RX ADMIN — TRAZODONE HYDROCHLORIDE 100 MG: 100 TABLET ORAL at 20:55

## 2020-12-06 RX ADMIN — HYDROMORPHONE HYDROCHLORIDE 2 MG: 2 TABLET ORAL at 04:09

## 2020-12-06 RX ADMIN — DOCUSATE SODIUM 50MG AND SENNOSIDES 8.6MG 1 TABLET: 8.6; 5 TABLET, FILM COATED ORAL at 08:17

## 2020-12-06 RX ADMIN — HYDROMORPHONE HYDROCHLORIDE 2 MG: 2 TABLET ORAL at 20:56

## 2020-12-06 RX ADMIN — DOCUSATE SODIUM 50MG AND SENNOSIDES 8.6MG 1 TABLET: 8.6; 5 TABLET, FILM COATED ORAL at 17:00

## 2020-12-06 RX ADMIN — FAMOTIDINE 20 MG: 20 TABLET, FILM COATED ORAL at 17:00

## 2020-12-06 RX ADMIN — ACETAMINOPHEN 650 MG: 325 TABLET ORAL at 17:16

## 2020-12-06 RX ADMIN — ESCITALOPRAM OXALATE 20 MG: 10 TABLET ORAL at 20:56

## 2020-12-06 RX ADMIN — Medication 81 MG: at 17:00

## 2020-12-06 RX ADMIN — Medication 81 MG: at 08:17

## 2020-12-06 RX ADMIN — Medication 10 ML: at 12:52

## 2020-12-06 RX ADMIN — FERROUS SULFATE TAB 325 MG (65 MG ELEMENTAL FE) 325 MG: 325 (65 FE) TAB at 08:17

## 2020-12-06 RX ADMIN — ACETAMINOPHEN 650 MG: 325 TABLET ORAL at 05:27

## 2020-12-06 RX ADMIN — ACETAMINOPHEN 650 MG: 325 TABLET ORAL at 12:51

## 2020-12-06 NOTE — PROGRESS NOTES
Patient is doing well. Pain is well controlled. Confusion has resolved completely. VSS. HDS. Neuro exam unremarkable, patient is A&Ox3, normal speech, CN 2-12 grossly intact, no upper or lower extremity weakness, changes in sensation, reflexes 2+ on all 4 ext, hand  5/5. Labs reviewed:   Lipids: ASCVD risk 4%, LDL 38.8  A1C done in October 5.4  TSH 0.7   CBC and BMP wnl. [facility] team appreciates the opportunity to participate in care of this patient and will sign off at this time. Please call at [phone] if have questions.      Juana Uriarte MD

## 2020-12-06 NOTE — PROGRESS NOTES
[address] [facility] RESIDENCY PROGRAM  PROGRESS NOTE     12/5/2020  PCP: Hayden Wright MD     [address] [facility] Medicine Residency Attending Addendum:  I saw and evaluated the patient on the day of the encounter with Dr. Ta Phan, performing the key elements of the service. I discussed the findings, assessment and plan with the resident and agree with the resident's findings and plan as documented in the resident's note. Patient back to baseline. Comfortable at the bedside. No need for MRI. Cranial nerve exam continues to be intact. Jono White MD, FAAFP, CAQSM, RMSK      Assessment/Plan:     Angella Vizcaino is a 63 y.o. female  with PMH of EDITH/MDD, OA, HTN, Asthma, PONV, and adverse effects of narcotics, causing hallucinations who is POD 3 s/p R total knee arthroplasty with Dr. Getachew Paulson on 12/3. The Family Medicine Service was consulted for Code Stroke management. Overnight Events: No acute events overnight. Received Dilaudid 2 mg PO at 4 AM and 8 AM.     Telemetry Reviewed: Periods of sinus bradycardia (rate high 40s-50s) and NSR.      S/p TKA POD 2:   - Management per primary team     Confusion: likely 2/2 post op delirium vs opioid induced encephalopathy. Low suspicion for TIA. Continues to be neurologically intact with no focal neurological deficits. Head CT negative, brain MRI pending.    - Neuro checks q4h  - Cardiac monitoring   - Continue aspirin 81 mg daily   - If MRI is negative will be safe for discharge      Anemia: Normocytic. BL 10-11. Last iron profile in 2015 wnl.  Post op Hgb 7.9.  - Start iron BID and continue bowel regimen on discharge   - Consider anemia work up outpatient      HTN: no home meds   - Follow up outpatient     Anxiety: stable   - Home lexapro and prn buspar restarted by primary      Insomnia: stable   - Home trazodone restarted by primary     Hx of Hypothyroidism: last TSH 0.7 No home meds  - follow up outpatient      Asthma: stable, no meds.      FEN/GI - Per primary team.    Activity - Per primary team  DVT prophylaxis - Per primary team  GI prophylaxis - Not indicated at this time  Disposition - Plan to d/c to Per primary team.  Code Status - Full, discussed with patient / caregivers    Neelam Simon discussed with Dr. Dequan Houston. Subjective:   Pt was seen and examined at bedside. Afebrile and hemodynamically stable. She states that her SCDs were very tight overnight and she now has some right calf pain. Her right knee continues to be painful. Denies chest pain, SOB, nausea, vomiting, abdominal pain, dizziness, headache. Objective:   Physical examination  Patient Vitals for the past 24 hrs:   Temp Pulse Resp BP SpO2   20 1517 98.8 °F (37.1 °C) (!) 57 (!) 57 (!) 149/76 93 %   20 1414 -- (!) 53 -- -- --   20 1412 -- 70 -- (!) 142/69 96 %   20 1400 -- 61 -- 121/60 95 %   20 1345 -- 67 -- 113/64 94 %   20 1144 98.7 °F (37.1 °C) 60 18 115/69 94 %   20 0956 -- 62 16 (!) 108/59 91 %   20 0935 -- (!) 56 16 (!) 107/53 92 %   20 0806 97.9 °F (36.6 °C) (!) 56 18 (!) 95/49 93 %   20 0411 98.9 °F (37.2 °C) 66 19 102/70 94 %   20 2313 98.3 °F (36.8 °C) 82 18 109/61 93 %      Temp (24hrs), Av.5 °F (36.9 °C), Min:97.9 °F (36.6 °C), Max:98.9 °F (37.2 °C)     O2 Flow Rate (L/min): 2 l/min   O2 Device: Room air    Date 20 190 - 20 07 - 20 0659   Shift 0654-6348 24 Hour Total 9620-4285 0705-1489 24 Hour Total   INTAKE   P.O.   0  0     P. O.   0  0   Shift Total(mL/kg)   0(0)  0(0)   OUTPUT   Urine(mL/kg/hr)    400 400     Urine Voided    400 400     Urine Occurrence(s) 2 x 3 x      Emesis/NG output          Emesis Occurrence(s)  0 x      Stool          Stool Occurrence(s)  0 x      Shift Total(mL/kg)    400(6.2) 400(6.2)   NET   0 -400 -400   Weight (kg) 64.9 64.9 64.9 64.9 64.9     General:   Alert, cooperative, no acute distress   Head:   Atraumatic   Eyes: Conjunctivae clear   ENT:  Slightly dry mucous membranes    Neck:  Supple, trachea midline   Lungs:   Clear to auscultation bilaterally, no increased work of breathing     Heart:   Normal rate, regular rhythm, no murmur   Abdomen:    Soft, non-tender      Extremities:  No edema, no calf tenderness    Pulses:  Symmetric all extremities   Skin:  Warm and dry    No rashes or lesions   Neurologic:  Alert and oriented x4. CN 2-12 grossly intact. Sensation intact. No focal deficits. Data Review:     CBC:  Recent Labs     12/04/20  0440   HGB 7.9*     Metabolic Panel:  Recent Labs     12/04/20  0440      K 3.8      CO2 24   BUN 18   CREA 0.68   GLU 80   CA 7.6*     Micro:  Lab Results   Component Value Date/Time    Culture result: MRSA NOT PRESENT 11/18/2020 01:54 PM    Culture result:  11/18/2020 01:54 PM         Screening of patient nares for MRSA is for surveillance purposes and, if positive, to facilitate isolation considerations in high risk settings. It is not intended for automatic decolonization interventions per se as regimens are not sufficiently effective to warrant routine use. Imaging:  Xr Knee Rt Max 2 Vws    Result Date: 12/3/2020  INDICATION: Knee replacement. Primary osteoarthritis of right knee. FINDINGS: Portable AP and crosstable lateral radiographs of the right knee demonstrate status post total knee arthroplasty. Alignment is anatomic. No complication is evident. Postoperative changes are seen in the anterior soft tissues. IMPRESSION: Status post right total knee arthroplasty. No evidence of complication. [facility] Mammo Rt Dx Incl Cad    Result Date: 11/10/2020  INDICATION:  Abnormal screening mammogram COMPARISON:  October 23, 2020 BREAST COMPOSITION:  There are scattered areas of fibroglandular density. FINDINGS: Right unilateral digital diagnostic mammography was performed and is interpreted in conjunction with a computer assisted detection (CAD) system.  There are grouped large rodlike calcifications in the medial right breast. Previously seen associated asymmetry does not persist on spot compression or ML imaging. IMPRESSION: BI-RADS 2: Benign. No mammographic evidence of malignancy. Large rodlike calcifications in the medial right breast are benign. Previously seen right breast asymmetry does not persist on additional imaging, compatible with overlapping fibroglandular elements. Annual screening mammography is recommended. The patient was notified of these results. Ct Code Neuro Head Wo Contrast    Result Date: 12/5/2020  EXAM: CT CODE NEURO HEAD WO CONTRAST INDICATION: Altered mental status; slurred speech COMPARISON: None. CONTRAST: None. TECHNIQUE: Unenhanced CT of the head was performed using 5 mm images. Brain and bone windows were generated. Coronal and sagittal reformats. CT dose reduction was achieved through use of a standardized protocol tailored for this examination and automatic exposure control for dose modulation. FINDINGS: The ventricles and sulci are normal in size, shape and configuration. . There is a chronic lacunar infarct in the right caudate head. . There is no intracranial hemorrhage, extra-axial collection, or mass effect. The basilar cisterns are open. No CT evidence of acute infarct. The bone windows demonstrate no abnormalities. The visualized portions of the paranasal sinuses and mastoid air cells are clear. IMPRESSION: No evidence of acute process.      Medications reviewed  Current Facility-Administered Medications   Medication Dose Route Frequency    HYDROmorphone (DILAUDID) tablet 2 mg  2 mg Oral Q4H PRN    busPIRone (BUSPAR) tablet 7.5 mg  7.5 mg Oral BID PRN    traZODone (DESYREL) tablet 100 mg  100 mg Oral QHS    sodium chloride (NS) flush 5-40 mL  5-40 mL IntraVENous Q8H    sodium chloride (NS) flush 5-40 mL  5-40 mL IntraVENous PRN    acetaminophen (TYLENOL) tablet 650 mg  650 mg Oral Q6H    naloxone (NARCAN) injection 0.4 mg 0.4 mg IntraVENous PRN    famotidine (PEPCID) tablet 20 mg  20 mg Oral BID    senna-docusate (PERICOLACE) 8.6-50 mg per tablet 1 Tab  1 Tab Oral BID    polyethylene glycol (MIRALAX) packet 17 g  17 g Oral DAILY    bisacodyL (DULCOLAX) suppository 10 mg  10 mg Rectal DAILY PRN    aspirin delayed-release tablet 81 mg  81 mg Oral BID    scopolamine (TRANSDERM-SCOP) 1 mg over 3 days 1 Patch  1 Patch TransDERmal Q72H    escitalopram oxalate (LEXAPRO) tablet 20 mg  20 mg Oral QHS         Signed:   Max Au DO  Family Medicine Resident      Attending note: Attending note to follow. ..

## 2020-12-06 NOTE — PROGRESS NOTES
Notified RN that we couldn't do MRI exam till after 19:30 because room was closed for covid.   If needed tonight on-call would have to be called

## 2020-12-06 NOTE — PROGRESS NOTES
TOTAL KNEE ARTHROPLASTY DAILY NOTE     ASSESSMENT / PLAN :   1. Pain Control : Acceptable - Some pain at times, but the patient does not wish to increase their medications  2. Overnight Issues :  None  3. Wound or incisional issue : Healing incision with no visible drainage  4. Therapy / Weight Bearing Recommendations : Weight bear as tolerated with use of a walker and two person assist while mobilizing  5. DVT Prophylaxis : Mechanical and Aspirin and mechanical lower extremity compression device  6. Disposition : Discharge to home with home health (maximum of 4 visits)  7. Medical Concerns : Stable, cleared by FP  8. Comments : slow to mobilize with some dizziness today. Plan for an overnight in the hospital and home in the am.      POD  3 Days Post-Op s/p Procedure(s):  RIGHT TOTAL KNEE ARTHROPLASTY MAKOPLASTY     SUBJECTIVE :     Overnight complaints : moderate pain. OBJECTIVE :     Vitals:    12/05/20 2344 12/06/20 0346 12/06/20 0756 12/06/20 0816   BP: 123/63 129/75  (!) 158/60   Pulse: 73 67 65 60   Resp: 17 17  18   Temp: 98.4 °F (36.9 °C) 98.3 °F (36.8 °C)  98.2 °F (36.8 °C)   SpO2: 90% 90%  94%   Weight:       Height:           Alert and oriented x3. Examination of the right knee reveals that the dressing is clean, dry and intact. Motor Exam : Pt is able to perform a straight leg raise. Sensation is intact to light touch. No calf pain. MEDS / LABS :     Key Anti-Platelet Anticoagulant Meds             aspirin delayed-release 81 mg tablet (Taking) Take 1 Tab by mouth two (2) times a day.            Labs:  Recent Labs     12/06/20  0622   HGB 7.9*   HCT 24.9*      K 4.0   *   CO2 27   BUN 12   CREA 0.44*   GLU 90      Patient mobility  Gait  Gait Abnormalities: Decreased step clearance  Ambulation - Level of Assistance: Minimal assistance, Assist x1, Additional time  Distance (ft): 10 Feet (ft)  Assistive Device: Walker, rolling, Gait belt        Lena Rice MD  Cell ([phone] [address] [place], [place] [address]LEONOR  Cell [phone]  Medical Assistants: Kimi Nolan [phone]                 Surgery Scheduler: [address] [phone] ext [phone]

## 2020-12-06 NOTE — PROGRESS NOTES
Problem: Mobility Impaired (Adult and Pediatric)  Goal: *Acute Goals and Plan of Care (Insert Text)  Description: FUNCTIONAL STATUS PRIOR TO ADMISSION: Patient was independent and active without use of DME.    HOME SUPPORT PRIOR TO ADMISSION: The patient lived with  and niece but did not require assist. 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 He will go into respite care for her recovery and her niece will be there to assist her. Physical Therapy Goals  Initiated 12/3/2020    1. Patient will move from supine to sit and sit to supine  in bed with independence within 4 days. 2. Patient will perform sit to stand with modified independence within 4 days. 3. Patient will ambulate with modified independence for 100 feet with the least restrictive device within 4 days. 4. Patient will ascend/descend 4 stairs with 1 handrail(s) with minimal assistance/contact guard assist within 4 days. 5. Patient will perform home exercise program per protocol with independence within 4 days. 6. Patient will demonstrate AROM 0-90 degrees in operative joint within 4 days. 12/6/2020 1415 by Mally Bolden  Outcome: Progressing Towards Goal  Note:   PHYSICAL THERAPY TREATMENT  Patient: Ahsan Pain (69 y.o. female)  Date: 12/6/2020  Diagnosis: Primary osteoarthritis of right knee [M17.11]  Primary osteoarthritis of right knee [M17.11]  Primary osteoarthritis of right knee [M17.11]   <principal problem not specified>  Procedure(s) (LRB):  RIGHT TOTAL KNEE ARTHROPLASTY MAKOPLASTY (Right) 3 Days Post-Op  Precautions: WBAT, Fall  Chart, physical therapy assessment, plan of care and goals were reviewed. ASSESSMENT  Patient continues with skilled PT services and is progressing towards goals. Improved tolerance for activity this afternoon, largely CGA for gait training. No knee instability noted.  Pt has zero stairs to enter home, with bedroom on first level of home, tho would like to attempt stair training prior to discharge tomorrow morning 2/2 having two story home. Current Level of Function Impacting Discharge (mobility/balance): CGA    Other factors to consider for discharge:          PLAN :  Patient continues to benefit from skilled intervention to address the above impairments. Continue treatment per established plan of care. to address goals. Recommendation for discharge: (in order for the patient to meet his/her long term goals)  Outpatient physical therapy follow up recommended for TKA    This discharge recommendation:  Has been made in collaboration with the attending provider and/or case management    IF patient discharges home will need the following DME: patient owns DME required for discharge       SUBJECTIVE:   Patient stated this is better now.     OBJECTIVE DATA SUMMARY:   Critical Behavior:  Neurologic State: Alert  Orientation Level: Oriented X4  Cognition: Follows commands, Decreased attention/concentration  Safety/Judgement: Awareness of environment, Fall prevention, Insight into deficits    Range of Motion:  AROM: Within functional limits  PROM: Within functional limits                      Functional Mobility Training:  Bed Mobility:     Supine to Sit: Stand-by assistance  Sit to Supine: Stand-by assistance  Scooting: Supervision        Transfers:  Sit to Stand: Contact guard assistance;Assist x1;Additional time  Stand to Sit: Stand-by assistance;Assist x1                             Balance:  Sitting: Intact  Standing: Intact; With support  Ambulation/Gait Training:  Distance (ft): 100 Feet (ft)  Assistive Device: Walker, rolling;Gait belt  Ambulation - Level of Assistance: Contact guard assistance;Assist x1        Gait Abnormalities: Decreased step clearance                                      Stairs:               Therapeutic Exercises:     EXERCISE   Sets   Reps   Active Active Assist   Passive Self ROM   Comments   Ankle Pumps   [x] []                                        []                                        []                                           Quad Sets   [x]                                        []                                        []                                        []                                           Hamstring Sets   []                                        []                                        []                                        []                                           Short Arc Quads   []                                        []                                        []                                        []                                           Knee Extension Stretch     []                                          []                                          [x]                                          []                                           Heel Slides   [x]                                        []                                        []                                        []                                           Long Arc Quads   []                                        []                                        []                                        []                                           Knee Flexion Stretch   []                                        []                                        []                                        []                                           Straight Leg Raises   [x]                                        [x]                                        []                                        []                                               Pain Ratin/10    Activity Tolerance:   Good    After treatment patient left in no apparent distress:   Supine in bed, Call bell within reach, and Bed / chair alarm activated    COMMUNICATION/COLLABORATION:   The patients plan of care was discussed with: Registered nurse.     Christine Smiley   Time Calculation: 28 mins          12/6/2020 1041 by Robbi Lopez  Outcome: Progressing Towards Goal  12/6/2020 1037 by Robbi Lopez  Outcome: Progressing Towards Goal  Note:   PHYSICAL THERAPY TREATMENT  Patient: Yelena Marvin (85 y.o. female)  Date: 12/6/2020  Diagnosis: Primary osteoarthritis of right knee [M17.11]  Primary osteoarthritis of right knee [M17.11]  Primary osteoarthritis of right knee [M17.11]   <principal problem not specified>  Procedure(s) (LRB):  RIGHT TOTAL KNEE ARTHROPLASTY MAKOPLASTY (Right) 3 Days Post-Op  Precautions: WBAT, Fall  Chart, physical therapy assessment, plan of care and goals were reviewed. ASSESSMENT  Patient continues with skilled PT services and slowly progressing towards goals. Requires up to Min A for functional transfers and short distance gait training, reports increased nausea and dizziness initially up sitting EOB, declined attempt to walk into BR d/t dizziness. Min A  for SPT to [facility] and gait training around end of bed. Pt requested to return to bed d/t pain. Reviewed HEP. Encouraged OOB to chair with nursing assist for lunch meal.     Current Level of Function Impacting Discharge (mobility/balance): Min A    Other factors to consider for discharge:          PLAN :  Patient continues to benefit from skilled intervention to address the above impairments. Continue treatment per established plan of care. to address goals. Recommendation for discharge: (in order for the patient to meet his/her long term goals)  Outpatient physical therapy follow up recommended for TKA    This discharge recommendation:  Has been made in collaboration with the attending provider and/or case management    IF patient discharges home will need the following DME: rolling walker       SUBJECTIVE:   Patient stated i I can't move my leg and it hurts.     OBJECTIVE DATA SUMMARY:   Critical Behavior:  Neurologic State: Alert  Orientation Level: Oriented X4  Cognition: Follows commands, Decreased attention/concentration  Safety/Judgement: Awareness of environment, Fall prevention, Insight into deficits    Range of Motion:  AROM: Within functional limits  PROM: Within functional limits                      Functional Mobility Training:  Bed Mobility:     Supine to Sit: Contact guard assistance  Sit to Supine: Minimum assistance  Scooting: Supervision        Transfers:  Sit to Stand: Contact guard assistance;Assist x1;Additional time  Stand to Sit: Stand-by assistance                             Balance:  Sitting: Intact  Standing: Intact; With support  Ambulation/Gait Training:  Distance (ft): 10 Feet (ft)  Assistive Device: Walker, rolling;Gait belt  Ambulation - Level of Assistance: Minimal assistance;Assist x1;Additional time        Gait Abnormalities: Decreased step clearance                                      Stairs:               Therapeutic Exercises:     EXERCISE   Sets   Reps   Active Active Assist   Passive Self ROM   Comments   Ankle Pumps   [x]                                        []                                        []                                        []                                           Quad Sets   [x]                                        []                                        []                                        []                                           Hamstring Sets   []                                        []                                        []                                        []                                           Short Arc Quads   [x]                                        []                                        []                                        []                                           Knee Extension Stretch     []                                          []                                          [x]                                          [] Heel Slides   [x]                                        []                                        []                                        []                                        In sitting   Long Arc Quads   []                                        []                                        []                                        []                                           Knee Flexion Stretch   []                                        []                                        []                                        []                                           Straight Leg Raises   []                                        [x]                                        []                                        []                                               Pain Ratin/10    Activity Tolerance:   Good    After treatment patient left in no apparent distress:   Supine in bed, Call bell within reach, and Bed / chair alarm activated    COMMUNICATION/COLLABORATION:   The patients plan of care was discussed with: Registered nurse.      Luan Beltran   Time Calculation: 27 mins

## 2020-12-06 NOTE — PROGRESS NOTES
Problem: Mobility Impaired (Adult and Pediatric)  Goal: *Acute Goals and Plan of Care (Insert Text)  Description: FUNCTIONAL STATUS PRIOR TO ADMISSION: Patient was independent and active without use of DME.    HOME SUPPORT PRIOR TO ADMISSION: The patient lived with  and niece but did not require assist. 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 He will go into respite care for her recovery and her niece will be there to assist her. Physical Therapy Goals  Initiated 12/3/2020    1. Patient will move from supine to sit and sit to supine  in bed with independence within 4 days. 2. Patient will perform sit to stand with modified independence within 4 days. 3. Patient will ambulate with modified independence for 100 feet with the least restrictive device within 4 days. 4. Patient will ascend/descend 4 stairs with 1 handrail(s) with minimal assistance/contact guard assist within 4 days. 5. Patient will perform home exercise program per protocol with independence within 4 days. 6. Patient will demonstrate AROM 0-90 degrees in operative joint within 4 days. Outcome: Progressing Towards Goal  Note:   PHYSICAL THERAPY TREATMENT  Patient: Anastacio Oliver (86 y.o. female)  Date: 12/6/2020  Diagnosis: Primary osteoarthritis of right knee [M17.11]  Primary osteoarthritis of right knee [M17.11]  Primary osteoarthritis of right knee [M17.11]   <principal problem not specified>  Procedure(s) (LRB):  RIGHT TOTAL KNEE ARTHROPLASTY MAKOPLASTY (Right) 3 Days Post-Op  Precautions: WBAT, Fall  Chart, physical therapy assessment, plan of care and goals were reviewed. ASSESSMENT  Patient continues with skilled PT services and slowly progressing towards goals. Requires up to Min A for functional transfers and short distance gait training, reports increased nausea and dizziness initially up sitting EOB, declined attempt to walk into BR d/t dizziness.  Min A  for SPT to [facility] and gait training around end of bed. Pt requested to return to bed d/t pain. Reviewed HEP. Encouraged OOB to chair with nursing assist for lunch meal.     Current Level of Function Impacting Discharge (mobility/balance): Min A    Other factors to consider for discharge:          PLAN :  Patient continues to benefit from skilled intervention to address the above impairments. Continue treatment per established plan of care. to address goals. Recommendation for discharge: (in order for the patient to meet his/her long term goals)  Outpatient physical therapy follow up recommended for TKA    This discharge recommendation:  Has been made in collaboration with the attending provider and/or case management    IF patient discharges home will need the following DME: rolling walker       SUBJECTIVE:   Patient stated i I can't move my leg and it hurts.     OBJECTIVE DATA SUMMARY:   Critical Behavior:  Neurologic State: Alert  Orientation Level: Oriented X4  Cognition: Follows commands, Decreased attention/concentration  Safety/Judgement: Awareness of environment, Fall prevention, Insight into deficits    Range of Motion:  AROM: Within functional limits  PROM: Within functional limits                      Functional Mobility Training:  Bed Mobility:     Supine to Sit: Contact guard assistance  Sit to Supine: Minimum assistance  Scooting: Supervision        Transfers:  Sit to Stand: Contact guard assistance;Assist x1;Additional time  Stand to Sit: Stand-by assistance                             Balance:  Sitting: Intact  Standing: Intact; With support  Ambulation/Gait Training:  Distance (ft): 10 Feet (ft)  Assistive Device: Walker, rolling;Gait belt  Ambulation - Level of Assistance: Minimal assistance;Assist x1;Additional time        Gait Abnormalities: Decreased step clearance                                      Stairs:               Therapeutic Exercises:     EXERCISE   Sets   Reps   Active Active Assist   Passive Self ROM Comments   Ankle Pumps   [x]                                        []                                        []                                        []                                           Quad Sets   [x]                                        []                                        []                                        []                                           Hamstring Sets   []                                        []                                        []                                        []                                           Short Arc Quads   [x]                                        []                                        []                                        []                                           Knee Extension Stretch     []                                          []                                          [x]                                          []                                           Heel Slides   [x]                                        []                                        []                                        []                                        In sitting   Long Arc Quads   []                                        []                                        []                                        []                                           Knee Flexion Stretch   []                                        []                                        []                                        []                                           Straight Leg Raises   []                                        [x]                                        []                                        []                                               Pain Ratin/10    Activity Tolerance:   Good    After treatment patient left in no apparent distress:   Supine in bed, Call bell within reach, and Bed / chair alarm activated    COMMUNICATION/COLLABORATION:   The patients plan of care was discussed with: Registered nurse.      Js Wheeler   Time Calculation: 27 mins

## 2020-12-06 NOTE — PROGRESS NOTES
OCCUPATIONAL THERAPY TREATMENT: Re-Evaluation and Discharge  Patient: Jair Vasquez (17 y.o. female)  Date: 12/6/2020  Diagnosis: Primary osteoarthritis of right knee [M17.11]  Primary osteoarthritis of right knee [M17.11]  Primary osteoarthritis of right knee [M17.11] <principal problem not specified>  Procedure(s) (LRB):  RIGHT TOTAL KNEE ARTHROPLASTY MAKOPLASTY (Right) 3 Days Post-Op  Precautions: WBAT, Fall  Chart, occupational therapy assessment, plan of care, and goals were reviewed. ASSESSMENT  Patient s/o code S called yesterday  Due to drowsiness and nonsensical speech. CT of head negative for acute process and pt back to her initial cognitive baseline observed on Friday. She is at an overall SBA and set-up level with LE ADLs, toileting and functional mobility using her RW to amb. She remains limited by high anxiety, R knee pain and decreased safety awareness. Recommend supervision from family at home for safety based on above. No further skilled OT required at this time. Current Level of Function Impacting Discharge (ADLs): SBA and set-up level with LE ADLs, toileting and functional mobility using her RW to amb    Other factors to consider for discharge: see above         PLAN :  Patient continues to benefit from skilled intervention to address the above impairments. Continue treatment per established plan of care. to address goals. Recommend with staff: Recommend with nursing patient to complete as able in order to maintain strength, endurance and independence: ADLs with SBA/setup, OOB to chair 3x/day and mobilizing to the [facility] for toileting with CG assist. Thank you for your assistance. Discharge OT    Recommendation for discharge: (in order for the patient to meet his/her long term goals)  No skilled occupational therapy/ follow up rehabilitation needs identified at this time.     This discharge recommendation:  Has not yet been discussed the attending provider and/or case management    IF patient discharges home will need the following DME: TBD       SUBJECTIVE:   Patient stated I am hurting too bad and have hypoglycemia to do to much.     OBJECTIVE DATA SUMMARY:   Cognitive/Behavioral Status:  Neurologic State: Alert  Orientation Level: Oriented X4  Cognition: Follows commands;Decreased attention/concentration  Perception: Appears intact  Perseveration: Perseverates during ADLS;Perseverates during conversation  Safety/Judgement: Awareness of environment; Fall prevention; Insight into deficits    Functional Mobility and Transfers for ADLs:  Bed Mobility:  Supine to Sit: Supervision; Additional time  Sit to Supine: Minimum assistance; Additional time(A for RLE)  Scooting: Supervision    Transfers:  Sit to Stand: Stand-by assistance; Additional time  Functional Transfers  Toilet Transfer : Stand-by assistance; Additional time(RW and BSC- pt hoping with no WB RLE due to pain)       Balance:  Sitting: Intact  Standing: Intact; With support    ADL Intervention:  Cognitive Retraining  Safety/Judgement: Awareness of environment; Fall prevention; Insight into deficits    Bathing: Patient instructed when bathing to not submerge wound in water, stand to shower or sponge bathe, cover wound with plastic and tape to ensure no water reaches bandage/wound without cues. Patient indicated understanding. Dressing joint: Patient instructed and demonstrated to don/doff Right LE first/last verbal cues. Patient instructed and demonstrated to don all clothing while sitting prior to standing, doff all clothing to knees while standing, then sit to doff clothing off from knees to feet in order to facilitate fall prevention, pain management, and energy conservation with Stand-by assistance. Dressing joint reach exercise:  To increase independence with lower body dressing, patient instructed and demonstrated to reach down Right LE in a seated position slowly to prevent tearing/shearing until slight pull is felt, hold at end range for 10 seconds, then return to starting upright position with Stand-by assistance. Patient instructed to complete three sets of three repetitions each daily. Home safety: Patient instructed on home modifications and safety (raise height of ADL objects, appropriate height of chair surfaces, recliner safety, change of floor surfaces, clear pathways) to increase independence and fall prevention. Patient indicated understanding. Standing: Patient instructed and demonstrated during ADLs to walk up to sink/counter top/surfaces, step into walker to increase safety of joint and fall prevention with Stand-by assistance. Patient educated about knee anatomy verbally and with pictures and educated to avoid rotation of Right LE. Instructed to apply concept to ADLs within the home (no twisting of knee during reaching across body, square off while using objects, slide objects along surfaces). Patient instructed to increase amount of time standing, observe standing position during ADLs in order to increase even weight bearing through bilateral LEs in order to increase independence with ADLs. Goal to be reached 30 days post - op, per orthopedic surgeon or per PT. Patient indicated understanding. Tub transfer: Patient instructed regarding when it is safe to begin transfer into tub (complete stairs with PT, advance exercises with PT high enough to clear tub height). Patient instructed to use the same technique as used with stairs when entering and exiting tub (\"up with the non-surgical, down with the surgical leg\"). Patient indicated understanding. Pain:  10/10    Activity Tolerance:   Fair and requires rest breaks      After treatment patient left in no apparent distress:   Supine in bed, Call bell within reach and Bed / chair alarm activated    COMMUNICATION/COLLABORATION:   The patients plan of care was discussed with: Physical therapy assistant and Registered nurse.      Katya Ward OT  Time Calculation: 27 mins

## 2020-12-06 NOTE — PROGRESS NOTES
10:34 AM  12/06/20      Transition Care plan     RUR NA    1. Patient will discharge with care from her niece to home. Niece will transport  2. Pateint will attend outpatient PT   3. Patient will follow up with Dr Bartolo Gilford as instructed   4.  CM will continue to follow for DC needs    Basil Salazar RN CCM

## 2020-12-06 NOTE — PROGRESS NOTES
Problem: Falls - Risk of  Goal: *Absence of Falls  Description: Document Lilly Barcenas Fall Risk and appropriate interventions in the flowsheet.   Outcome: Progressing Towards Goal  Note: Fall Risk Interventions:  Mobility Interventions: Bed/chair exit alarm, Communicate number of staff needed for ambulation/transfer, OT consult for ADLs, Patient to call before getting OOB, PT Consult for assist device competence, PT Consult for mobility concerns, Strengthening exercises (ROM-active/passive), Utilize walker, cane, or other assistive device, Utilize gait belt for transfers/ambulation         Medication Interventions: Bed/chair exit alarm, Patient to call before getting OOB, Teach patient to arise slowly, Utilize gait belt for transfers/ambulation    Elimination Interventions: Bed/chair exit alarm    History of Falls Interventions: Bed/chair exit alarm         Problem: Patient Education: Go to Patient Education Activity  Goal: Patient/Family Education  Outcome: Progressing Towards Goal     Problem: Patient Education: Go to Patient Education Activity  Goal: Patient/Family Education  Outcome: Progressing Towards Goal

## 2020-12-07 VITALS
RESPIRATION RATE: 18 BRPM | SYSTOLIC BLOOD PRESSURE: 160 MMHG | OXYGEN SATURATION: 97 % | HEIGHT: 61 IN | DIASTOLIC BLOOD PRESSURE: 58 MMHG | HEART RATE: 66 BPM | BODY MASS INDEX: 27 KG/M2 | TEMPERATURE: 98.7 F | WEIGHT: 143 LBS

## 2020-12-07 PROBLEM — M17.11 ARTHRITIS OF RIGHT KNEE: Status: ACTIVE | Noted: 2020-12-07

## 2020-12-07 PROCEDURE — 74011250637 HC RX REV CODE- 250/637: Performed by: ORTHOPAEDIC SURGERY

## 2020-12-07 PROCEDURE — 97116 GAIT TRAINING THERAPY: CPT

## 2020-12-07 PROCEDURE — 65660000000 HC RM CCU STEPDOWN

## 2020-12-07 PROCEDURE — 97530 THERAPEUTIC ACTIVITIES: CPT

## 2020-12-07 PROCEDURE — 99218 HC RM OBSERVATION: CPT

## 2020-12-07 PROCEDURE — 74011250637 HC RX REV CODE- 250/637: Performed by: PHYSICIAN ASSISTANT

## 2020-12-07 RX ADMIN — DOCUSATE SODIUM 50MG AND SENNOSIDES 8.6MG 1 TABLET: 8.6; 5 TABLET, FILM COATED ORAL at 09:03

## 2020-12-07 RX ADMIN — POLYETHYLENE GLYCOL 3350 17 G: 17 POWDER, FOR SOLUTION ORAL at 09:03

## 2020-12-07 RX ADMIN — Medication 81 MG: at 09:03

## 2020-12-07 RX ADMIN — HYDROMORPHONE HYDROCHLORIDE 2 MG: 2 TABLET ORAL at 13:13

## 2020-12-07 RX ADMIN — FAMOTIDINE 20 MG: 20 TABLET, FILM COATED ORAL at 09:02

## 2020-12-07 RX ADMIN — ACETAMINOPHEN 650 MG: 325 TABLET ORAL at 00:59

## 2020-12-07 RX ADMIN — HYDROMORPHONE HYDROCHLORIDE 2 MG: 2 TABLET ORAL at 05:23

## 2020-12-07 RX ADMIN — ACETAMINOPHEN 650 MG: 325 TABLET ORAL at 05:23

## 2020-12-07 RX ADMIN — Medication 10 ML: at 05:23

## 2020-12-07 RX ADMIN — HYDROMORPHONE HYDROCHLORIDE 2 MG: 2 TABLET ORAL at 09:03

## 2020-12-07 RX ADMIN — HYDROMORPHONE HYDROCHLORIDE 2 MG: 2 TABLET ORAL at 00:59

## 2020-12-07 RX ADMIN — ACETAMINOPHEN 650 MG: 325 TABLET ORAL at 12:27

## 2020-12-07 NOTE — PROGRESS NOTES
TOTAL KNEE ARTHROPLASTY DAILY NOTE     ASSESSMENT / PLAN :   1. Pain Control : Excellent - Able to sleep and participate with therapy  2. Overnight Issues : none  3. Wound or incisional issue : Healing incision with no visible drainage  4. Therapy / Weight Bearing Recommendations : Weight bear as tolerated with use of a walker and two person assist while mobilizing  5. DVT Prophylaxis : Mechanical and Aspirin and mechanical lower extremity compression device  6. Disposition : Home - outpatient PT  7. Medical Concerns : none - stable  8. Comments : Discharge home today after cleared by PT     POD  4 Days Post-Op s/p Procedure(s):  RIGHT TOTAL KNEE ARTHROPLASTY MAKOPLASTY     SUBJECTIVE :     Overnight complaints : none today. OBJECTIVE :     Vitals:    12/07/20 0518 12/07/20 0526 12/07/20 0700 12/07/20 0857   BP: (!) 153/73 (!) 145/75  (!) 166/61   Pulse:  60 67 64   Resp:  16  18   Temp: 98.2 °F (36.8 °C) 98.2 °F (36.8 °C)  98.8 °F (37.1 °C)   SpO2:       Weight:       Height:           Alert and oriented x3. Examination of the right knee reveals that the dressing is clean, dry and intact. Motor Exam : Pt is able to perform a straight leg raise. Sensation is intact to light touch. No calf pain. MEDS / LABS :     Key Anti-Platelet Anticoagulant Meds             aspirin delayed-release 81 mg tablet (Taking) Take 1 Tab by mouth two (2) times a day.            Labs:  Recent Labs     12/06/20  0622   HGB 7.9*   HCT 24.9*      K 4.0   *   CO2 27   BUN 12   CREA 0.44*   GLU 90      Patient mobility  Gait  Gait Abnormalities: Decreased step clearance  Ambulation - Level of Assistance: Contact guard assistance, Stand-by assistance  Distance (ft): 100 Feet (ft)  Assistive Device: Walker, rolling, Gait belt  Rail Use: Both  Stairs - Level of Assistance: Contact guard assistance  Number of Stairs Trained: 1 Technology MD Gregg  Cell [phone]  Pedro Layton PA-C  Cell [phone]  Medical Assistants: Kg Tyson [phone]                 Surgery Scheduler: Shante Lynch, [address] [phone] ext [phone]

## 2020-12-07 NOTE — PROGRESS NOTES
Bedside and Verbal shift change report given to Pierce Alva (oncoming nurse) by Deangelo Powers (offgoing nurse). Report included the following information SBAR and Kardex.

## 2020-12-07 NOTE — PROGRESS NOTES
Discharged patient to home. Educated patient on medications and instructions. IV removed iwthout complications. Dressing was clean dry and intact. Pain was managed with 2 mg. Of dilaudid q 4. VSS, no dizziness, no nausea or vomiting. Patient tolerating regular diet.

## 2020-12-07 NOTE — PROGRESS NOTES
Speech pathology note  Reviewed chart and note patient with Code S called on 12/5/20 due to changes in speech, and there was concern for CVA. Note CT showed no evidence of acute process and MRI was cancelled as symptoms resolved and were determined to be related to medication. Note patient passed the STAND and a regular diet was ordered. Introduced self and role of SLP to patient. Patient denied dysphagia, including no coughing or choking with PO intake. Patient reported that she has returned to her baseline speech/cognitive function. Patient reported that her  had a large CVA and she is familiar with aphasia and apraxia, and she has no issues with language. Formal SLP evaluation not clinically indicated at this time. Will sign off. Please re-consult if further needs arise. Thank you.     Julianne Lambert, Nato Carmona., CCC-SLP

## 2020-12-07 NOTE — PROGRESS NOTES
Problem: Mobility Impaired (Adult and Pediatric)  Goal: *Acute Goals and Plan of Care (Insert Text)  Description: FUNCTIONAL STATUS PRIOR TO ADMISSION: Patient was independent and active without use of DME.    HOME SUPPORT PRIOR TO ADMISSION: The patient lived with  and niece but did not require assist. 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 He will go into respite care for her recovery and her niece will be there to assist her. Physical Therapy Goals  Initiated 12/3/2020    1. Patient will move from supine to sit and sit to supine  in bed with independence within 4 days. 2. Patient will perform sit to stand with modified independence within 4 days. 3. Patient will ambulate with modified independence for 100 feet with the least restrictive device within 4 days. 4. Patient will ascend/descend 4 stairs with 1 handrail(s) with minimal assistance/contact guard assist within 4 days. 5. Patient will perform home exercise program per protocol with independence within 4 days. 6. Patient will demonstrate AROM 0-90 degrees in operative joint within 4 days. Outcome: Progressing Towards Goal  Note:   PHYSICAL THERAPY TREATMENT  Patient: Dalton Riding (72 y.o. female)  Date: 12/7/2020  Diagnosis: Primary osteoarthritis of right knee [M17.11]  Primary osteoarthritis of right knee [M17.11]  Primary osteoarthritis of right knee [M17.11]   <principal problem not specified>  Procedure(s) (LRB):  RIGHT TOTAL KNEE ARTHROPLASTY MAKOPLASTY (Right) 4 Days Post-Op  Precautions: WBAT, Fall  Chart, physical therapy assessment, plan of care and goals were reviewed. ASSESSMENT  Patient continues with skilled PT services and is progressing towards goals. Assisted to BR with CGA, declining to attempt stair training until afer receiving pain medications.  .     Current Level of Function Impacting Discharge (mobility/balance): CGA    Other factors to consider for discharge: PLAN :  Patient continues to benefit from skilled intervention to address the above impairments. Continue treatment per established plan of care. to address goals. Recommendation for discharge: (in order for the patient to meet his/her long term goals)  Outpatient physical therapy follow up recommended for TKA    This discharge recommendation:  Has been made in collaboration with the attending provider and/or case management    IF patient discharges home will need the following DME: patient owns DME required for discharge       SUBJECTIVE:   Patient stated i need pain meds before we do anymore.     OBJECTIVE DATA SUMMARY:   Critical Behavior:  Neurologic State: Alert  Orientation Level: Oriented X4  Cognition: Appropriate for age attention/concentration  Safety/Judgement: Awareness of environment, Fall prevention, Insight into deficits    Range of Motion:                            Functional Mobility Training:  Bed Mobility:     Supine to Sit: Stand-by assistance  Sit to Supine: Minimum assistance  Scooting: Stand-by assistance        Transfers:  Sit to Stand: Contact guard assistance  Stand to Sit: Stand-by assistance                             Balance:  Sitting: Intact  Standing: Intact; With support  Ambulation/Gait Training:  Distance (ft): 20 Feet (ft)  Assistive Device: Walker, rolling;Gait belt  Ambulation - Level of Assistance: Contact guard assistance        Gait Abnormalities: Decreased step clearance; Antalgic                                      Stairs:               Therapeutic Exercises:     EXERCISE   Sets   Reps   Active Active Assist   Passive Self ROM   Comments   Ankle Pumps   [x]                                        []                                        []                                        []                                           Quad Sets   [x]                                        []                                        []                                        [] Hamstring Sets   []                                        []                                        []                                        []                                           Short Arc Quads   []                                        []                                        []                                        []                                           Knee Extension Stretch     []                                          []                                          [x]                                          []                                           Heel Slides   [x]                                        []                                        []                                        []                                           Long Arc Quads   []                                        []                                        []                                        []                                           Knee Flexion Stretch   []                                        []                                        []                                        []                                           Straight Leg Raises   [x]                                        [x]                                        []                                        []                                               Pain Ratin/10    Activity Tolerance:   Good    After treatment patient left in no apparent distress:   Supine in bed and Call bell within reach    COMMUNICATION/COLLABORATION:   The patients plan of care was discussed with: Registered nurse.      Kandyce Gowers   Time Calculation: 10 mins

## 2020-12-07 NOTE — PROGRESS NOTES
12/7/2020     3:08 PM  Care Management Interventions  PCP Verified by CM: Yes(alfred)  Mode of Transport at Discharge: Other (see comment)(Family)  Transition of Care Consult (CM Consult): Discharge Planning  MyChart Signup: No  Discharge Durable Medical Equipment: No  Physical Therapy Consult: Yes  Occupational Therapy Consult: Yes  Speech Therapy Consult: No  Current Support Network: Lives with Spouse(Pt's granddaughter will stay with pt. )  Confirm Follow Up Transport: Family  Discharge Location  Discharge Placement: Home with outpatient services    8:57 AM  RUR:  NA - OBS  Risk Level: [x]Low []Moderate []High  Value-based purchasing: [] Yes [x] No  Bundle patient: [] Yes [x] No   Specify:     Transition of care plan:  1. Awaiting medical clearance and DC order. PT/OT treating. 2. Home with OP. 3. Outpatient follow-up. 4. Pt's family to transport.

## 2020-12-07 NOTE — PROGRESS NOTES
Problem: Mobility Impaired (Adult and Pediatric)  Goal: *Acute Goals and Plan of Care (Insert Text)  Description: FUNCTIONAL STATUS PRIOR TO ADMISSION: Patient was independent and active without use of DME.    HOME SUPPORT PRIOR TO ADMISSION: The patient lived with  and niece but did not require assist. 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 He will go into respite care for her recovery and her niece will be there to assist her. Physical Therapy Goals  Initiated 12/3/2020    1. Patient will move from supine to sit and sit to supine  in bed with independence within 4 days. 2. Patient will perform sit to stand with modified independence within 4 days. 3. Patient will ambulate with modified independence for 100 feet with the least restrictive device within 4 days. 4. Patient will ascend/descend 4 stairs with 1 handrail(s) with minimal assistance/contact guard assist within 4 days. 5. Patient will perform home exercise program per protocol with independence within 4 days. 6. Patient will demonstrate AROM 0-90 degrees in operative joint within 4 days. 12/7/2020 1026 by Siddharth Patel  Outcome: Progressing Towards Goal  Note:   PHYSICAL THERAPY TREATMENT  Patient: Wen Howard (83 y.o. female)  Date: 12/7/2020  Diagnosis: Primary osteoarthritis of right knee [M17.11]  Primary osteoarthritis of right knee [M17.11]  Primary osteoarthritis of right knee [M17.11]   <principal problem not specified>  Procedure(s) (LRB):  RIGHT TOTAL KNEE ARTHROPLASTY MAKOPLASTY (Right) 4 Days Post-Op  Precautions: WBAT, Fall  Chart, physical therapy assessment, plan of care and goals were reviewed. ASSESSMENT  Patient continues with skilled PT services and is progressing towards goals. Pt reports improved pan control, requiring up to [id number] for functional mobility tasks using RW for steadying.  No knee instability noted with stair training tho pt does not have to ascend/descend stairs immediately upon return home. Pt verbalized understanding to use RW for all gait and transfers. Reviewed HEP and proper car transfers. Pt is cleared for discharge from PT standpoint RN notified. Current Level of Function Impacting Discharge (mobility/balance): CGA    Other factors to consider for discharge:          PLAN :  Patient continues to benefit from skilled intervention to address the above impairments. Continue treatment per established plan of care. to address goals. Recommendation for discharge: (in order for the patient to meet his/her long term goals)  Outpatient physical therapy follow up recommended for TKA    This discharge recommendation:  Has been made in collaboration with the attending provider and/or case management    IF patient discharges home will need the following DME: patient owns DME required for discharge       SUBJECTIVE:   Patient stated would you expect my knee to bed this sore.     OBJECTIVE DATA SUMMARY:   Critical Behavior:  Neurologic State: Alert  Orientation Level: Oriented X4  Cognition: Appropriate for age attention/concentration  Safety/Judgement: Awareness of environment, Fall prevention, Insight into deficits    Range of Motion:                            Functional Mobility Training:  Bed Mobility:     Supine to Sit: Stand-by assistance  Sit to Supine: Stand-by assistance  Scooting: Stand-by assistance        Transfers:  Sit to Stand: Contact guard assistance;Stand-by assistance  Stand to Sit: Stand-by assistance                             Balance:  Sitting: Intact  Standing: Intact; With support  Ambulation/Gait Training:  Distance (ft): 100 Feet (ft)  Assistive Device: Walker, rolling;Gait belt  Ambulation - Level of Assistance: Contact guard assistance;Stand-by assistance        Gait Abnormalities: Decreased step clearance                                      Stairs:  Number of Stairs Trained: 4  Stairs - Level of Assistance: Contact guard assistance   Rail Use: Both    Therapeutic Exercises:     EXERCISE   Sets   Reps   Active Active Assist   Passive Self ROM   Comments   Ankle Pumps   [x]                                        []                                        []                                        []                                           Quad Sets   [x]                                        []                                        []                                        []                                           Hamstring Sets   []                                        []                                        []                                        []                                           Short Arc Quads   [x]                                        []                                        []                                        []                                           Knee Extension Stretch     []                                          []                                          [x]                                          []                                           Heel Slides   [x]                                        []                                        []                                        []                                           Long Arc Quads   []                                        []                                        []                                        []                                           Knee Flexion Stretch   []                                        []                                        []                                        []                                           Straight Leg Raises   [x]                                        []                                        []                                        []                                               Pain Ratin/10    Activity Tolerance:   Good    After treatment patient left in no apparent distress:   Supine in bed and Call bell within reach    COMMUNICATION/COLLABORATION:   The patients plan of care was discussed with: Registered nurse. Jonathan Isbell   Time Calculation: 30 mins          12/7/2020 0902 by Matt Love  Outcome: Progressing Towards Goal  Note:   PHYSICAL THERAPY TREATMENT  Patient: Cristian Durbin (72 y.o. female)  Date: 12/7/2020  Diagnosis: Primary osteoarthritis of right knee [M17.11]  Primary osteoarthritis of right knee [M17.11]  Primary osteoarthritis of right knee [M17.11]   <principal problem not specified>  Procedure(s) (LRB):  RIGHT TOTAL KNEE ARTHROPLASTY MAKOPLASTY (Right) 4 Days Post-Op  Precautions: WBAT, Fall  Chart, physical therapy assessment, plan of care and goals were reviewed. ASSESSMENT  Patient continues with skilled PT services and is progressing towards goals. Assisted to BR with CGA, declining to attempt stair training until afer receiving pain medications. .     Current Level of Function Impacting Discharge (mobility/balance): CGA    Other factors to consider for discharge:          PLAN :  Patient continues to benefit from skilled intervention to address the above impairments. Continue treatment per established plan of care. to address goals. Recommendation for discharge: (in order for the patient to meet his/her long term goals)  Outpatient physical therapy follow up recommended for TKA    This discharge recommendation:  Has been made in collaboration with the attending provider and/or case management    IF patient discharges home will need the following DME: patient owns DME required for discharge       SUBJECTIVE:   Patient stated i need pain meds before we do anymore.     OBJECTIVE DATA SUMMARY:   Critical Behavior:  Neurologic State: Alert  Orientation Level: Oriented X4  Cognition: Appropriate for age attention/concentration  Safety/Judgement: Awareness of environment, Fall prevention, Insight into deficits    Range of Motion:                            Functional Mobility Training:  Bed Mobility:     Supine to Sit: Stand-by assistance  Sit to Supine: Minimum assistance  Scooting: Stand-by assistance        Transfers:  Sit to Stand: Contact guard assistance  Stand to Sit: Stand-by assistance                             Balance:  Sitting: Intact  Standing: Intact; With support  Ambulation/Gait Training:  Distance (ft): 20 Feet (ft)  Assistive Device: Walker, rolling;Gait belt  Ambulation - Level of Assistance: Contact guard assistance        Gait Abnormalities: Decreased step clearance; [place]alg[place]                                      Stairs:               Therapeutic Exercises:     EXERCISE   Sets   Reps   Active Active Assist   Passive Self ROM   Comments   Ankle Pumps   [x]                                        []                                        []                                        []                                           Quad Sets   [x]                                        []                                        []                                        []                                           Hamstring Sets   []                                        []                                        []                                        []                                           Short Arc Quads   []                                        []                                        []                                        []                                           Knee Extension Stretch     []                                          []                                          [x]                                          []                                           Heel Slides   [x]                                        []                                        []                                        []                                           Long Arc Quads   []                                        [] []                                        []                                           Knee Flexion Stretch   []                                        []                                        []                                        []                                           Straight Leg Raises   [x]                                        [x]                                        []                                        []                                               Pain Ratin/10    Activity Tolerance:   Good    After treatment patient left in no apparent distress:   Supine in bed and Call bell within reach    COMMUNICATION/COLLABORATION:   The patients plan of care was discussed with: Registered nurse.      Rebecca Blackwood   Time Calculation: 10 mins

## 2020-12-08 ENCOUNTER — PATIENT OUTREACH (OUTPATIENT)
Dept: CASE MANAGEMENT | Age: 65
End: 2020-12-08

## 2020-12-08 NOTE — PROGRESS NOTES
Patient contacted regarding recent discharge and COVID-19 risk. Discussed COVID-19 related testing which was available at this time. Test results were negative. Patient informed of results, if available? yes  performed as pre op test  Care Transition Nurse/ Ambulatory Care Manager/ LPN Care Coordinator contacted the patient by telephone to perform post discharge assessment. Verified name and  with patient as identifiers. Patient has following risk factors of: asthma. CTN/ACM/LPN reviewed discharge instructions, medical action plan and red flags related to discharge diagnosis. Reviewed and educated them on any new and changed medications related to discharge diagnosis. Advised obtaining a 90-day supply of all daily and as-needed medications. Advance Care Planning:   Does patient have an Advance Directive: health care decision makers updated    Education provided regarding infection prevention, and signs and symptoms of COVID-19 and when to seek medical attention with patient who verbalized understanding. Discussed exposure protocols and quarantine from [address] you at higher risk for severe illness  and given an opportunity for questions and concerns. The patient agrees to contact the COVID-19 hotline [phone] or PCP office for questions related to their healthcare. CTN/ACM/LPN provided contact information for future reference. From CDC: Are you at higher risk for severe illness?  Wash your hands often.  Avoid close contact (6 feet, which is about two arm lengths) with people who are sick.  Put distance between yourself and other people if COVID-19 is spreading in your community.  Clean and disinfect frequently touched surfaces.  Avoid all cruise travel and non-essential air travel.  Call your healthcare professional if you have concerns about COVID-19 and your underlying condition or if you are sick.     For more information on steps you can take to protect yourself, see CDC's How to Protect Yourself      Patient/family/caregiver given information for [facility] Loop and agrees to enroll yes  Patient's preferred e-mail:  na  Patient's preferred phone number: [phone]  Based on Loop alert triggers, patient will be contacted by nurse care manager for worsening symptoms. Pt will be further monitored by COVID Loop Team based on severity of symptoms and risk factors.

## 2020-12-08 NOTE — ADT AUTH CERT NOTES
PA Letter of Determination by Anand Osorio         Review Status  Review Entered    In Primary  2020 09:53        Criteria Review    We recommend that the following pt's hospitalization under OBSERVATION [104]  status   is upgraded to INPATIENT If you agree, please place a new ADMIT order  in [facility]  as recommended.        Name:          Cristian Durbin   :            1955   ROBEL# :         [id number]  Insurance:   [address]      Clinical summary        patient status post TKA  Vitals                           elevated blood pressure  Labs and Imaging       hemoglobin of 7.9  MCG criteria applies   YES  Comments       patient status post TKA, post TKA patient had delirium and confusion,  patient hallucinating, work-up initiated, likely secondary to narcotics, patient  symptoms have improved, patient with hemoglobin of 7.9, work-up in progress,  needing medical optimization        This chart was reviewed at 7:52 AM 2020     Taty Fortune MD MD   Physician [facility]   Cell [phone]       Knee Arthroplasty, Total - Care Day (2020) by Caron Boyce         Review Status  Review Entered    Completed  2020 11:51        Criteria Review       Care Day: 4 Care Date: 2020 Level of Care: Inpatient Floor    Guideline Day 3    Clinical Status    (X) * Hemodynamic stability    2020 11:51:56 EST by Caron Boyce      98.2, 158/60, 60, 18, 94%RA    ( ) * Adequate flexion    2020 11:51:56 EST by Caron Boyce      Pt is able to perform a straight leg raise.     (X) * No evidence of lower extremity vascular or neurologic compromise    2020 11:51:56 EST by Caron Boyce      Sensation is intact to light touch.    No calf pain    (X) * No evidence of postoperative or surgical site infection    2020 11:51:56 EST by aCron Boyce      Examination of the right knee reveals that the dressing is clean, dry and intact.    (X) * Pain absent or managed    12/6/2020 11:51:56 EST by Ebenezer Gay      Acceptable - Some pain at times, but the patient does not wish to increase their medications    ( ) * Voids urine    ( ) * Discharge plans and education understood    Activity    (X) * Ambulatory or acceptable for next level of care    12/6/2020 11:51:56 EST by Ebenezer Gay      PT nacny, d/c to [facility], Weight bear as tolerated with use of a walker and two person assist while mobilizing    Routes    (X) * Oral hydration, medications, and diet    12/6/2020 11:51:56 EST by Ebenezer Gay      regular diet    Interventions    (X) Physical therapy    12/6/2020 11:51:56 EST by Ebenezer Gay      A: continues with skilled PT services and slowly progressing towards goals. Requires up to [address] [place] [address] for functional transfers and short distance gait training  recs: Outpatient physical therapy follow up recommended for TKA    (X) DVT prophylaxis    12/6/2020 11:51:56 EST by Ebenezer Gay      SCDs, Asa 81 mg x 1 (bid)    Medications    ( ) * DVT prophylaxis for next level of care arranged    (X) Oral analgesics as indicated    12/6/2020 11:51:56 EST by Ebenezer Gay      dilaudid 2 mg po x2 (q4hrs prn)    * Milestone    Additional Notes    12/6:    ortho PN:    Subjective: Overnight complaints : moderate pain.         Physical exam:    Alert and oriented x3.     Examination of the right knee reveals that the dressing is clean, dry and intact. Motor Exam : Pt is able to perform a straight leg raise.     Sensation is intact to light touch.     No calf pain.         ASSESSMENT / PLAN :    Pain Control : Acceptable - Some pain at times, but the patient does not wish to increase their medications    Overnight Issues :  None    Wound or incisional issue : Healing incision with no visible drainage    Therapy / Weight Bearing Recommendations : Weight bear as tolerated with use of a walker and two person assist while mobilizing    DVT Prophylaxis : Mechanical and Aspirin and mechanical lower extremity compression device    Disposition : Discharge to home with home health (maximum of 4 visits)    Medical Concerns : Stable, cleared by FP    Comments : slow to mobilize with some dizziness today. Plan for an overnight in the hospital and home in the am.              IM PN:    No acute events overnight. Received Dilaudid 2 mg PO at 4 AM and 8 AM. Afebrile and hemodynamically stable. She states that her SCDs were very tight overnight and she now has some right calf pain. Her right knee continues to be painful. Denies chest pain, SOB, nausea, vomiting, abdominal pain, dizziness, headache.     Confusion: likely 2/2 post op delirium vs opioid induced encephalopathy. Low suspicion for TIA. Continues to be neurologically intact with no focal neurological deficits.  Head CT negative, brain MRI pending.     - Neuro checks q4h    - Cardiac monitoring    - Continue aspirin 81 mg daily    - If MRI is negative will be safe for discharge    Anemia:     Start iron BID and continue bowel regimen on discharge              results:    HGB 7.9    HCT 24.9        Knee Arthroplasty, Total - Care Day (12/5/2020) by Austin Pro         Review Status  Review Entered    Completed  12/5/2020 13:02        Criteria Review       Care Day: 3 Care Date: 12/5/2020 Level of Care: Inpatient Floor    Guideline Day 2    Level Of Care    (X) Floor    12/5/2020 13:02:15 EST by Austin Pro      transferred to remote tele    Clinical Status    (X) * Procedure completed    12/5/2020 13:02:15 EST by Austin Pro      POD2    (X) * Hemodynamic stability    12/5/2020 13:02:15 EST by Austin Pro      97.9, 56, 107/53, 18, 93%RA    (X) * No evidence of lower extremity vascular or neurologic compromise    12/5/2020 13:02:15 EST by Austin Pro      Healing incision with no visible drainage    (X) * Transfers independently and tolerates supported ambulation    12/5/2020 13:02:15 EST by Austin Pro   Weight bear as tolerated with use of a walker and two person assist while mobilizing    Activity    (X) * Assisted ambulation    12/5/2020 13:02:15 EST by Emogene [place]      Weight bear as tolerated with use of a walker and two person assist while mobilizing    Routes    ( ) * Oral hydration, medications    12/5/2020 13:02:15 EST by Emogene L[place]      NPO    ( ) * Advance diet    12/5/2020 13:02:15 EST by Emogene Lary      NPO    Interventions    (X) DVT prophylaxis    12/5/2020 13:02:15 EST by Emogene Lary      SCDs    Medications    ( ) Prophylactic antibiotics discontinued    12/5/2020 13:02:15 EST by Emogene [place]      Ancef 2g IV q8hrs    (X) * Epidural analgesics and PCA absent [K]    12/5/2020 13:02:15 EST by Emogene Lary      absent    * Milestone    Additional Notes    12/5:    IM PN:    Responded to code stroke, but noted that this patient is followed by  HOANGFAZAL CONNELLY LATOYA.  [facility] physician also responded to code.  Patient admitted with knee replacement.  Post op has gotten 10mg PO dilaudid over 18hr.  Per MAR, patient is narcotic naive. Rebekah Carl has no focal symptoms on my exam, and is AAOx3.  Nursing notes that earlier she falls asleep easily and has garbled speech around those moments.  I will leave further care in the hands of [facility] group (see below for [facility] notes)       Call from Dr Donna Grace (hospitalist): Code Stroke called on this patient by Marty Blanc RN, who follows with Dr Joe Mas outpatient. Patient was drowsy and her speech was nonsensical.    Patient seen and assessed at the bedside on her way out to the CT:    Patient states she is confused.  A&O x3, no focal deficits, answers all questions appropriately but is mildly slurred, no facial droop, upper or lower extremity weakness or changes in sensation.     BP (!) 108/59   Pulse 62   Temp 97.9 °F (36.6 °C)   Resp 16   Ht 5' 1\" (1.549 m)   Wt 143 lb (64.9 kg)   SpO2 91%   BMI 27.02 kg/m²    Assessment:    Patient is POD1 s/p R total knee arthroplasty. She is treated with Dilaudid for pain, last dose of 4 mg was at 4 am. Patient is HDS, VSS. Plan:    STAT CT head now    Tele-neuro consult after    Transfer to tele with cardiac monitoring    Neuro check q4h    NPO until passes bedside swallow    Consider narcan dose when is back from CT and reassess mentation.              Ortho PN:    SUBJECTIVE : Overnight complaints : Stable         Physical exam:    Alert and oriented x3.     Examination of the right knee reveals that the dressing is clean, dry and intact. Motor Exam : Pt is able to perform a straight leg raise. Sensation is intact to light touch.     No calf pain.         ASSESSMENT / PLAN :    Pain Control : Acceptable - Some pain at times, but the patient does not wish to increase their medications    Overnight Issues : RR called this am for somnolence. Resolved. Wound or incisional issue : Healing incision with no visible drainage    Therapy / Weight Bearing Recommendations : Weight bear as tolerated with use of a walker and two person assist while mobilizing    DVT Prophylaxis : Mechanical and Aspirin and mechanical lower extremity compression device    Disposition : Discharge to home with home health (maximum of 4 visits), likely on Sunday am for logistical reasons. Medical Concerns : Stable, possible CVA with marked improvement in mentation. This is likely opioid related delerium. Will limit to Dilaudid 2mg for now. Discussed wit FP and will defer to them if further w/u is necessary at this time prior to discharge. MS presently has improved to baseline.     Comments : Stable, continued to be drowsy              Deferred PT d/t RRT              meds not previously listed:    Dilaudid 4 mg IV x1        Knee Arthroplasty, Total - Care Day (12/4/2020) by Lee Kessler Status  Review Entered    Completed  12/4/2020 14:35        Criteria Review       Care Day: 2 Care Date: 12/4/2020 Level of Care: Inpatient Floor Guideline Day 1    Level Of Care    (X) OR to floor    12/4/2020 14:35:29 EST by Bruce Calhoun      ortho floor    Clinical Status    ( ) * Clinical Indications met [G]    Routes    (X) IV fluids, medications    12/4/2020 14:35:29 EST by Bruce Calhoun      0.9% sod chlor cont ivf bolus 500mL x1  0.9% sod chor cont iv 125cc/hr    (X) Diet as tolerated postoperatively    12/4/2020 14:35:29 EST by Bruce Calhoun      Regular diet    Medications    (X) Prophylactic antibiotics    12/4/2020 14:35:29 EST by Bruce Calhoun      Ancef 2g iv x2    * Milestone    Additional Notes    98.2-86/48-51-17-96% RA       1. Pain Control : Acceptable - Some pain at times, but the patient does not wish to increase their medications. Some hypotension with Oxycodone, will trial hydrocodone. 2. Overnight Issues : none    3. Wound or incisional issue : Healing incision with no visible drainage    4. Therapy / Weight Bearing Recommendations : Weight bear as tolerated with use of a walker and two person assist while mobilizing    5. DVT Prophylaxis : Mechanical and Aspirin and mechanical lower extremity compression device    6. Disposition : Discharge to home with home health (maximum of 4 visits)    7. Medical Concerns : stable    Comments : none       POD  1 Day Post-Op s/p Procedure(s):    RIGHT TOTAL KNEE ARTHROPLASTY MAKOPLASTY       Alert and oriented x3.      Examination of the right knee reveals that the dressing is clean, dry and intact. Motor Exam : Pt is able to perform a straight leg raise.     Sensation is intact to light touch.      No calf pain.         Ca 7.6 hgb 7.9    PT eval and treat    Incentive spirometry q1hr    Out of bed with assistance    Aspirin 81mg po x1    Dilaudid 4mg po x1    Oxycodone 5mg po x2           Knee Arthroplasty, Total - Care Day  (12/3/2020) by Lee Nunez         Review Status  Review Entered    Completed  12/4/2020 10:41        Criteria Review       Care Day: 1 Care Date: 12/3/2020 Level of Care: Inpatient Floor    Guideline Day 1    Level Of Care    (X) OR to floor    12/4/2020 10:41:36 EST by Natalia Reed      ortho floor    Clinical Status    ( ) * Clinical Indications met [G]    Routes    (X) IV fluids, medications    12/4/2020 10:41:37 EST by Natalia Reed      0.9% sod chlor cont iv 125cc/hr Lactated ringers cont iv 125cc/hr    (X) Diet as tolerated postoperatively    12/4/2020 10:41:37 EST by Natalia Reed      Regular diet    Interventions    (X) Limb x-ray postoperatively    12/4/2020 10:41:37 EST by Natalia Reed      XR knee right- Status post right total knee arthroplasty. No evidence of complication. Medications    (X) Prophylactic antibiotics    12/4/2020 10:41:37 EST by Natalia Reed      Ancef 2g iv x2    * Milestone    Additional Notes    97.8-116/46-43-% 2L NC       INDICATIONS:    The patient is a 63 y.o., female who has complained of a long history of knee pain.  The patient has failed conservative treatment and presents for definitive operative care.         Date of Procedure: 12/3/2020    Preoperative Diagnosis: Primary osteoarthritis of right knee [M17.11]    Postoperative Diagnosis: * No post-op diagnosis entered *      Procedure: Procedure(s):    RIGHT TOTAL KNEE ARTHROPLASTY MAKOPLASTY       Glu 104    Continuous pulse ox    PT/OT eval and treat    Consult care management    Incentive spirometry q1hr    Out of bed with assistance    Asprin 81mg po x1    Zofran 4mg iv x1    Oxycodone 10mg po x1    Oxycodone 5mg po x3              Knee Arthroplasty, Total - Clinical Indications for Procedure by Laura Phillips         Review Status  Review Entered    Completed  12/4/2020 10:39        Criteria Review       Clinical Indications for Procedure    Most Recent : Natalia Reed Most Recent Date: 12/4/2020 10:39:35 EST    ( ) Procedure is indicated for  1 or more  of the following  (1) (2) (3):       ( ) Degenerative joint disease as indicated by Chayo Desai the following  (5) (6):          (X) Presence of significant radiographic findings, including knee joint destruction, angular deformity,          or severe narrowing          12/4/2020 10:39:35 EST by Alejandrina Kathleen            osteoarthritis          (X) Patient has failed or is not candidate for more conservative measures (eg, osteotomy).           12/4/2020 10:39:35 EST by Alejandrina Kathleen            failed conservative treatment          (X) Treatment indicated due to  1 or more  of the following :             (X) Disabling pain             12/4/2020 10:39:35 EST by Alejandrina Kathleen               long history of knee pain

## 2020-12-09 ENCOUNTER — TELEPHONE (OUTPATIENT)
Dept: SURGERY | Age: 65
End: 2020-12-09

## 2020-12-09 NOTE — TELEPHONE ENCOUNTER
Post Discharge Phone placed to patient after Joint Replacement surgery   Spoke with patient    Patient is taking narcotics  States pain is tolerable and pain medication is effective. Patient was able to fill prescriptions, lyrica prescription was not at the pharmacy, pt will text Dr. Kindra Mcmillan.   Medication questions answered   States discharge instructions were clear and easy to understand has no questions  Patient is using a walker without difficulty, moving every hour  Able to do exercises regularly  Bruising is minimal  Swelling is moderate  Patient is elevating leg and using ice with good relief  Education r/t positioning provided  States incision is intact without drainage  Denies N/V, appetite is ok, drinking protein shakes  Constipation is mild, had BM  Follow up appointment is scheduled with surgeon   PT is scheduled yesterday  Denies fever, cough, chest pain, SOB  Denies any unusual symptoms  Discussed calling surgeon or PCP for concerns  Opportunity given for patient to ask questions

## 2021-01-13 ENCOUNTER — VIRTUAL VISIT (OUTPATIENT)
Dept: FAMILY MEDICINE CLINIC | Age: 66
End: 2021-01-13
Payer: COMMERCIAL

## 2021-01-13 ENCOUNTER — TELEPHONE (OUTPATIENT)
Dept: ONCOLOGY | Age: 66
End: 2021-01-13

## 2021-01-13 DIAGNOSIS — D64.9 ANEMIA, UNSPECIFIED TYPE: ICD-10-CM

## 2021-01-13 DIAGNOSIS — I10 HTN (HYPERTENSION), MALIGNANT: ICD-10-CM

## 2021-01-13 DIAGNOSIS — R11.0 NAUSEA: ICD-10-CM

## 2021-01-13 DIAGNOSIS — R53.83 FATIGUE, UNSPECIFIED TYPE: ICD-10-CM

## 2021-01-13 DIAGNOSIS — E55.9 VITAMIN D DEFICIENCY: ICD-10-CM

## 2021-01-13 DIAGNOSIS — Z96.651 S/P TKR (TOTAL KNEE REPLACEMENT), RIGHT: ICD-10-CM

## 2021-01-13 DIAGNOSIS — M17.11 ARTHRITIS OF RIGHT KNEE: Primary | ICD-10-CM

## 2021-01-13 DIAGNOSIS — E03.9 HYPOTHYROIDISM, UNSPECIFIED TYPE: ICD-10-CM

## 2021-01-13 DIAGNOSIS — R73.9 HYPERGLYCEMIA: ICD-10-CM

## 2021-01-13 PROCEDURE — 99214 OFFICE O/P EST MOD 30 MIN: CPT | Performed by: FAMILY MEDICINE

## 2021-01-13 NOTE — TELEPHONE ENCOUNTER
Patient called and stated she had surgery on 12/3/20 and her iron levels have dropped very low. She is being referred by Dr. Kady Mueller. The first available appt would be the second week in February, is there any way we could see her any sooner? Please advise and call patient back.      # [phone]

## 2021-01-13 NOTE — PROGRESS NOTES
Patient is here today after having a knee replacement in early December. At the time she was discharged home with a hemoglobin of 7.9.  1 month prior hemoglobin was 10.9. Patient is taking iron for the last month but is continued to have fatigue and dizziness. Patient has history of gastric bypass. In addition she is due to check her thyroid. Patient undergoing rehab and doing well. Consent: Tatyana Mosley, who was seen by synchronous (real-time) audio-video technology, and/or her healthcare decision maker, is aware that this patient-initiated, Telehealth encounter on 1/13/2021 is a billable service, with coverage as determined by her insurance carrier. She is aware that she may receive a bill and has provided verbal consent to proceed: YES-Consent obtained within past 12 months        712  Subjective:   Tatyana Mosley is a 63 y.o. female who was seen for No chief complaint on file. Prior to Admission medications    Medication Sig Start Date End Date Taking? Authorizing Provider   aspirin delayed-release 81 mg tablet Take 1 Tab by mouth two (2) times a day. 12/3/20   Stephanie Hussein MD   ibuprofen (MOTRIN) 800 mg tablet Take 1 Tab by mouth every six (6) hours as needed for Pain. 12/3/20   Stephanie Hussein MD   pregabalin (Lyrica) 75 mg capsule Take 1 Cap by mouth daily (with dinner). Max Daily Amount: 75 mg. 12/3/20   Stephanie Hussein MD   acetaminophen (Acetaminophen Pain Relief) 500 mg tablet Take 2 Tabs by mouth every six (6) hours as needed for Pain. 12/3/20   Stephanie Hussein MD   ondansetron (ZOFRAN ODT) 8 mg disintegrating tablet Take 0.5 Tabs by mouth every eight (8) hours as needed for Nausea. 12/3/20   Stephanie Hussein MD   traZODone (DESYREL) 50 mg tablet Take 100 mg by mouth nightly. Provider, Historical   acetaminophen (Tylenol Extra Strength) 500 mg tablet Take 1,000 mg by mouth every six (6) hours as needed for Pain.     Provider, Historical   busPIRone (BUSPAR) 7.5 mg tablet Take 1 Tab by mouth two (2) times daily as needed (anxiety). 10/23/20   Jo Adams NP   escitalopram oxalate (LEXAPRO) 20 mg tablet TAKE 1 TABLET BY MOUTH ONCE DAILY 8/13/20   Jo Adams NP     Allergies   Allergen Reactions    Nsaids (Non-Steroidal Anti-Inflammatory Drug) Other (comments)     Gastric bypass       ROS    Objective:   Vital Signs: (As obtained by patient/caregiver at home)  There were no vitals taken for this visit. [      Assessment & Plan:   Diagnoses and all orders for this visit:    1. Arthritis of right knee  -Status post knee replacement and doing well continue with PT    2. Anemia, unspecified type  -     CBC WITH AUTOMATED DIFF; Future  -     METABOLIC PANEL, COMPREHENSIVE; Future  -     IRON PROFILE; Future  -     FERRITIN; Future  -     REFERRAL TO ONCOLOGY  -Patient is very anemic upon discharge and currently symptomatic  -Suspect she is absorbing iron due to gastric bypass  -We will check labs refer to oncology and hematology    3. S/P TKR (total knee replacement), right  -As above    4. Fatigue, unspecified type  -Due to #2    5. Nausea  -Most likely due to #2    6. Hyperglycemia  -     HEMOGLOBIN A1C WITH EAG; Future    7. HTN (hypertension), malignant  -Blood pressure is good at home    8. Vitamin D deficiency  -     VITAMIN D, 25 HYDROXY; Future    9. Hypothyroidism, unspecified type  -     TSH 3RD GENERATION; Future                    We discussed the expected course, resolution and complications of the diagnosis(es) in detail. Medication risks, benefits, costs, interactions, and alternatives were discussed as indicated. I advised her to contact the office if her condition worsens, changes or fails to improve as anticipated. She expressed understanding with the diagnosis(es) and plan. Connor Short is a 63 y.o. female being evaluated by a video visit encounter for concerns as above. A caregiver was present when appropriate.  Due to this being a TeleHealth encounter (During FWFKQ-80 public health emergency), evaluation of the following organ systems was limited: Vitals/Constitutional/EENT/Resp/CV/GI//MS/Neuro/Skin/Heme-Lymph-Imm. Pursuant to the emergency declaration under the [address] [place] [address], [facility] waiver authority and the [employer] Act, this Virtual  Visit was conducted, with patient's (and/or legal guardian's) consent, to reduce the patient's risk of exposure to COVID-19 and provide necessary medical care. Services were provided through a video synchronous discussion virtually to substitute for in-person clinic visit. Patient and provider were located at their individual homes.         Iram Bernabe MD

## 2021-01-14 NOTE — PROGRESS NOTES
[address] [facility] Oncology at [address] [place] [address]  [phone]    Hematology / Oncology Consult    Reason for Visit:   Jagdish Ward is a 63 y.o. female who is seen by synchronous (real-time) audio-video technology on 1/15/2021 request of Dr. Chirstina Naylor for evaluation of anemia. Hematology Oncology Treatment History:   N/A    History of Present Illness:   Jagdish Ward is a 63 y.o. female who is here for evaluation of anemia. Medical history significant for gastric bypass, hypothyroidism and asthma. She had a knee replacement early 2020. Reports blood loss during surgery. She was discharged on 20 with Hgb of 7.9. Recent labs on 1/15/21 show improvement in hgb to 9.5 but iron saturation remains low at 8. She is taking iron supplements once daily for the last month. She is also taking bariatric vitamins with iron. Taking stool softeners twice daily for constipation with iron supplements. Normal colonoscopy and endoscopy 7 years ago per patient Denies melena/hematochezia/epistaxis/hematuria. Reports fatigue, nausea, dizziness. Denies SOB. Reports ice cravings Denies CP, heart palpitations.      Past Medical History:   Diagnosis Date    Adverse effect of other narcotics, sequela     Most pills cause hallucination and nausea    Anxiety     Asthma     Benign heart murmur     Difficult intubation 2012    Patient states she has a small mouth    Hypertension     resolved with weight loss    Hypoglycemia 10/2020    Hypothyroidism     Iron deficiency anemia     Osteoarthritis     PONV (postoperative nausea and vomiting)     Psychiatric disorder     anxiety, depression    Snoring     Syncopal episodes     Post Gastric bypass- food helps    Urinary incontinence       Past Surgical History:   Procedure Laterality Date    HX CATARACT REMOVAL Bilateral  & 2012    HX CERVICAL FUSION      C2-7    HX  SECTION  X2    HX CHOLECYSTECTOMY  2018    HX GASTRIC BYPASS      HX HYSTERECTOMY  1992    HX KNEE ARTHROSCOPY Right 2008    HX TONSILLECTOMY      HX WISDOM TEETH EXTRACTION        Social History     Tobacco Use    Smoking status: Former Smoker     Packs/day: 0.25     Types: Cigarettes     Quit date:      Years since quittin.0    Smokeless tobacco: Never Used   Substance Use Topics    Alcohol use: No     Alcohol/week: 0.0 standard drinks      Family History   Problem Relation Age of Onset    Heart Disease Mother     Post-op Nausea/Vomiting Mother     Cancer Father         PROSTATE TO SPINE    Clotting Disorder Neg Hx      Current Outpatient Medications   Medication Sig    aspirin delayed-release 81 mg tablet Take 1 Tab by mouth two (2) times a day.  ibuprofen (MOTRIN) 800 mg tablet Take 1 Tab by mouth every six (6) hours as needed for Pain.  pregabalin (Lyrica) 75 mg capsule Take 1 Cap by mouth daily (with dinner). Max Daily Amount: 75 mg.  acetaminophen (Acetaminophen Pain Relief) 500 mg tablet Take 2 Tabs by mouth every six (6) hours as needed for Pain.  ondansetron (ZOFRAN ODT) 8 mg disintegrating tablet Take 0.5 Tabs by mouth every eight (8) hours as needed for Nausea.  traZODone (DESYREL) 50 mg tablet Take 100 mg by mouth nightly.  acetaminophen (Tylenol Extra Strength) 500 mg tablet Take 1,000 mg by mouth every six (6) hours as needed for Pain.  busPIRone (BUSPAR) 7.5 mg tablet Take 1 Tab by mouth two (2) times daily as needed (anxiety).  escitalopram oxalate (LEXAPRO) 20 mg tablet TAKE 1 TABLET BY MOUTH ONCE DAILY     No current facility-administered medications for this visit. Allergies   Allergen Reactions    Nsaids (Non-Steroidal Anti-Inflammatory Drug) Other (comments)     Gastric bypass        Review of Systems: A complete review of systems was obtained, negative except as described above.     Physical Exam:     Due to this being a TeleHealth evaluation, many elements of the physical examination are unable to be assessed. Constitutional: Appears well-developed and well-nourished in no apparent distress   Mental status: Alert and awake, Oriented to person/place/time, Able to follow commands  Eyes: EOM normal, Sclera normal, No visible ocular discharge  HENT: Normocephalic, atraumatic; Mouth/Throat: Moist mucous membranes, External Ears normal  Neck: No visualized mass  Pulmonary/Chest: Respiratory effort normal, No visualized signs of difficulty breathing or respiratory distress   Musculoskeletal: Normal gait with no signs of ataxia, Normal range of motion of neck  Neurological: No facial asymmetry (Cranial nerve 7 motor function), No gaze palsy  Skin: No significant exanthematous lesions or discoloration noted on facial skin  Psychiatric: Normal affect, normal judgment/insight. No hallucinations         Results:     Lab Results   Component Value Date/Time    WBC 5.6 12/06/2020 06:22 AM    HGB 7.9 (L) 12/06/2020 06:22 AM    HCT 24.9 (L) 12/06/2020 06:22 AM    PLATELET 206 51/09/1163 06:22 AM    MCV 86.8 12/06/2020 06:22 AM    ABS. NEUTROPHILS 5.9 11/18/2020 01:54 PM     Lab Results   Component Value Date/Time    Sodium 141 12/06/2020 06:22 AM    Potassium 4.0 12/06/2020 06:22 AM    Chloride 110 (H) 12/06/2020 06:22 AM    CO2 27 12/06/2020 06:22 AM    Glucose 90 12/06/2020 06:22 AM    BUN 12 12/06/2020 06:22 AM    Creatinine 0.44 (L) 12/06/2020 06:22 AM    GFR est AA >60 12/06/2020 06:22 AM    GFR est non-AA >60 12/06/2020 06:22 AM    Calcium 8.1 (L) 12/06/2020 06:22 AM    Glucose (POC) 83 12/05/2020 10:11 AM     Lab Results   Component Value Date/Time    Bilirubin, total 0.3 11/18/2020 01:54 PM    ALT (SGPT) 32 11/18/2020 01:54 PM    Alk.  phosphatase 58 11/18/2020 01:54 PM    Protein, total 6.3 (L) 11/18/2020 01:54 PM    Albumin 3.6 11/18/2020 01:54 PM    Globulin 2.7 11/18/2020 01:54 PM     Lab Results   Component Value Date/Time    Iron 114 11/02/2015 03:41 PM    TIBC 330 11/02/2015 03:41 PM    Iron % saturation 35 11/02/2015 03:41 PM    Ferritin 104 11/02/2015 03:41 PM       Lab Results   Component Value Date/Time    Vitamin B12 294 05/22/2020 09:45 AM    Folate >19.9 11/02/2015 03:41 PM     Lab Results   Component Value Date/Time    TSH 0.70 10/23/2020 11:36 AM     Lab Results   Component Value Date/Time    HEP C VIRUS AB <0.1 11/02/2015 03:41 PM         Imaging:     Radiology report(s) reviewed. .    Assessment & Plan:   Rowena Berumen is a 63 y.o. female is seen for anemia. 1. Iron deficiency anemia: Likely 2/2 to blood loss during recent knee surgery and malabsorption due to history of gastric bypass. Hgb after surgery 12/2020 dropped to 7.9. Now slightly improved to Hgb 9.5 on 1/15/21. However, patient would benefit from parenteral iron given severe symptoms. -- Continue ferrous sulfate once daily and bariatric supplement with iron once daily, continue PRN stool softeners. -- Injectafer x 2 now. -- Follow up in 10 weeks repeat labs, MD visit. -- Sending communication to Dr. Tom Lr. 2. Vitamin B12 deficiency / h/o gastric bypass: 5/22/20 B12 level 294. Per patient she stopped taking B12 supplements b/c worried about interaction w lexapro. No interaction listed and pt advised to restart. -- Advised restart cyanocobalamin 1000mcg daily  -- Recheck in 3 months. I appreciate the opportunity to participate in Ms. Kriss Sepulveda's care. I have personally seen and evaluated the patient in conjunction with Elizabeth Jara NP. I find the patient's history and physical exam are consistent with the NP's documentation. I agree with the above assessment and plan, which I have edited if needed. I was in the office while conducting this encounter. The patient was at her home    Consent:  She and/or her healthcare decision maker is aware that this patient-initiated Telehealth encounter is a billable service, with coverage as determined by her insurance carrier.  She is aware that she may receive a bill and has provided verbal consent to proceed: YES    Pursuant to the emergency declaration under the [facility], [address] [place] [address] authority and the [employer] Act, this Virtual  Visit was conducted, with patient's (and/or legal guardian's) consent, to reduce the patient's risk of exposure to COVID-19 and provide necessary medical care. Services were provided through a video synchronous discussion virtually to substitute for in-person visit.       Signed By: Leslee Apple MD     January 15, 2021

## 2021-01-15 ENCOUNTER — VIRTUAL VISIT (OUTPATIENT)
Dept: ONCOLOGY | Age: 66
End: 2021-01-15
Payer: COMMERCIAL

## 2021-01-15 DIAGNOSIS — Z98.84 HISTORY OF GASTRIC BYPASS: ICD-10-CM

## 2021-01-15 DIAGNOSIS — D50.0 IRON DEFICIENCY ANEMIA DUE TO CHRONIC BLOOD LOSS: Primary | ICD-10-CM

## 2021-01-15 DIAGNOSIS — E53.8 VITAMIN B12 DEFICIENCY: ICD-10-CM

## 2021-01-15 LAB
25(OH)D3 SERPL-MCNC: 26.3 NG/ML (ref 30–100)
ALBUMIN SERPL-MCNC: 3.7 G/DL (ref 3.5–5)
ALBUMIN/GLOB SERPL: 1.5 {RATIO} (ref 1.1–2.2)
ALP SERPL-CCNC: 63 U/L (ref 45–117)
ALT SERPL-CCNC: 21 U/L (ref 12–78)
ANION GAP SERPL CALC-SCNC: 5 MMOL/L (ref 5–15)
AST SERPL-CCNC: 19 U/L (ref 15–37)
BASOPHILS # BLD: 0.1 K/UL (ref 0–0.1)
BASOPHILS NFR BLD: 2 % (ref 0–1)
BILIRUB SERPL-MCNC: 0.3 MG/DL (ref 0.2–1)
BUN SERPL-MCNC: 20 MG/DL (ref 6–20)
BUN/CREAT SERPL: 37 (ref 12–20)
CALCIUM SERPL-MCNC: 8.8 MG/DL (ref 8.5–10.1)
CHLORIDE SERPL-SCNC: 105 MMOL/L (ref 97–108)
CO2 SERPL-SCNC: 28 MMOL/L (ref 21–32)
CREAT SERPL-MCNC: 0.54 MG/DL (ref 0.55–1.02)
DIFFERENTIAL METHOD BLD: ABNORMAL
EOSINOPHIL # BLD: 0.1 K/UL (ref 0–0.4)
EOSINOPHIL NFR BLD: 1 % (ref 0–7)
ERYTHROCYTE [DISTWIDTH] IN BLOOD BY AUTOMATED COUNT: 17.3 % (ref 11.5–14.5)
EST. AVERAGE GLUCOSE BLD GHB EST-MCNC: 103 MG/DL
FERRITIN SERPL-MCNC: 12 NG/ML (ref 8–252)
GLOBULIN SER CALC-MCNC: 2.5 G/DL (ref 2–4)
GLUCOSE SERPL-MCNC: 70 MG/DL (ref 65–100)
HBA1C MFR BLD: 5.2 % (ref 4–5.6)
HCT VFR BLD AUTO: 31.9 % (ref 35–47)
HGB BLD-MCNC: 9.5 G/DL (ref 11.5–16)
IMM GRANULOCYTES # BLD AUTO: 0 K/UL (ref 0–0.04)
IMM GRANULOCYTES NFR BLD AUTO: 0 % (ref 0–0.5)
IRON SATN MFR SERPL: 8 % (ref 20–50)
IRON SERPL-MCNC: 29 UG/DL (ref 35–150)
LYMPHOCYTES # BLD: 1.5 K/UL (ref 0.8–3.5)
LYMPHOCYTES NFR BLD: 23 % (ref 12–49)
MCH RBC QN AUTO: 26.3 PG (ref 26–34)
MCHC RBC AUTO-ENTMCNC: 29.8 G/DL (ref 30–36.5)
MCV RBC AUTO: 88.4 FL (ref 80–99)
MONOCYTES # BLD: 0.3 K/UL (ref 0–1)
MONOCYTES NFR BLD: 5 % (ref 5–13)
NEUTS SEG # BLD: 4.5 K/UL (ref 1.8–8)
NEUTS SEG NFR BLD: 69 % (ref 32–75)
NRBC # BLD: 0 K/UL (ref 0–0.01)
NRBC BLD-RTO: 0 PER 100 WBC
PLATELET # BLD AUTO: 355 K/UL (ref 150–400)
PMV BLD AUTO: 12.8 FL (ref 8.9–12.9)
POTASSIUM SERPL-SCNC: 4.6 MMOL/L (ref 3.5–5.1)
PROT SERPL-MCNC: 6.2 G/DL (ref 6.4–8.2)
RBC # BLD AUTO: 3.61 M/UL (ref 3.8–5.2)
SODIUM SERPL-SCNC: 138 MMOL/L (ref 136–145)
TIBC SERPL-MCNC: 378 UG/DL (ref 250–450)
TSH SERPL DL<=0.05 MIU/L-ACNC: 1.03 UIU/ML (ref 0.36–3.74)
WBC # BLD AUTO: 6.6 K/UL (ref 3.6–11)

## 2021-01-15 PROCEDURE — 99204 OFFICE O/P NEW MOD 45 MIN: CPT | Performed by: INTERNAL MEDICINE

## 2021-01-15 RX ORDER — DIPHENHYDRAMINE HYDROCHLORIDE 50 MG/ML
50 INJECTION, SOLUTION INTRAMUSCULAR; INTRAVENOUS AS NEEDED
Status: CANCELLED
Start: 2021-01-27

## 2021-01-15 RX ORDER — ONDANSETRON 2 MG/ML
8 INJECTION INTRAMUSCULAR; INTRAVENOUS AS NEEDED
Status: CANCELLED | OUTPATIENT
Start: 2021-01-21

## 2021-01-15 RX ORDER — DIPHENHYDRAMINE HYDROCHLORIDE 50 MG/ML
50 INJECTION, SOLUTION INTRAMUSCULAR; INTRAVENOUS AS NEEDED
Status: CANCELLED
Start: 2021-01-21

## 2021-01-15 RX ORDER — HEPARIN 100 UNIT/ML
300-500 SYRINGE INTRAVENOUS AS NEEDED
Status: CANCELLED
Start: 2021-01-26

## 2021-01-15 RX ORDER — ACETAMINOPHEN 325 MG/1
650 TABLET ORAL AS NEEDED
Status: CANCELLED
Start: 2021-01-21

## 2021-01-15 RX ORDER — HEPARIN 100 UNIT/ML
300-500 SYRINGE INTRAVENOUS AS NEEDED
Status: CANCELLED
Start: 2021-01-19

## 2021-01-15 RX ORDER — DIPHENHYDRAMINE HYDROCHLORIDE 50 MG/ML
25 INJECTION, SOLUTION INTRAMUSCULAR; INTRAVENOUS AS NEEDED
Status: CANCELLED
Start: 2021-01-21

## 2021-01-15 RX ORDER — SODIUM CHLORIDE 0.9 % (FLUSH) 0.9 %
10 SYRINGE (ML) INJECTION AS NEEDED
Status: CANCELLED | OUTPATIENT
Start: 2021-01-21

## 2021-01-15 RX ORDER — SODIUM CHLORIDE 0.9 % (FLUSH) 0.9 %
10 SYRINGE (ML) INJECTION AS NEEDED
Status: CANCELLED | OUTPATIENT
Start: 2021-01-27

## 2021-01-15 RX ORDER — EPINEPHRINE 1 MG/ML
0.3 INJECTION, SOLUTION, CONCENTRATE INTRAVENOUS AS NEEDED
Status: CANCELLED | OUTPATIENT
Start: 2021-01-21

## 2021-01-15 RX ORDER — SODIUM CHLORIDE 9 MG/ML
25 INJECTION, SOLUTION INTRAVENOUS CONTINUOUS
Status: CANCELLED
Start: 2021-01-27

## 2021-01-15 RX ORDER — ALBUTEROL SULFATE 0.83 MG/ML
2.5 SOLUTION RESPIRATORY (INHALATION) AS NEEDED
Status: CANCELLED
Start: 2021-01-21

## 2021-01-15 RX ORDER — ALBUTEROL SULFATE 0.83 MG/ML
2.5 SOLUTION RESPIRATORY (INHALATION) AS NEEDED
Status: CANCELLED
Start: 2021-01-27

## 2021-01-15 RX ORDER — SODIUM CHLORIDE 9 MG/ML
10 INJECTION INTRAMUSCULAR; INTRAVENOUS; SUBCUTANEOUS AS NEEDED
Status: CANCELLED | OUTPATIENT
Start: 2021-01-21

## 2021-01-15 RX ORDER — SODIUM CHLORIDE 9 MG/ML
25 INJECTION, SOLUTION INTRAVENOUS CONTINUOUS
Status: CANCELLED
Start: 2021-01-21

## 2021-01-15 RX ORDER — HYDROCORTISONE SODIUM SUCCINATE 100 MG/2ML
100 INJECTION, POWDER, FOR SOLUTION INTRAMUSCULAR; INTRAVENOUS AS NEEDED
Status: CANCELLED | OUTPATIENT
Start: 2021-01-27

## 2021-01-15 RX ORDER — FERROUS SULFATE 324(65)MG
TABLET, DELAYED RELEASE (ENTERIC COATED) ORAL
COMMUNITY
End: 2021-11-02

## 2021-01-15 RX ORDER — DIPHENHYDRAMINE HYDROCHLORIDE 50 MG/ML
25 INJECTION, SOLUTION INTRAMUSCULAR; INTRAVENOUS AS NEEDED
Status: CANCELLED
Start: 2021-01-27

## 2021-01-15 RX ORDER — ONDANSETRON 2 MG/ML
8 INJECTION INTRAMUSCULAR; INTRAVENOUS AS NEEDED
Status: CANCELLED | OUTPATIENT
Start: 2021-01-27

## 2021-01-15 RX ORDER — ACETAMINOPHEN 325 MG/1
650 TABLET ORAL AS NEEDED
Status: CANCELLED
Start: 2021-01-27

## 2021-01-15 RX ORDER — EPINEPHRINE 1 MG/ML
0.3 INJECTION, SOLUTION, CONCENTRATE INTRAVENOUS AS NEEDED
Status: CANCELLED | OUTPATIENT
Start: 2021-01-27

## 2021-01-15 RX ORDER — SODIUM CHLORIDE 9 MG/ML
10 INJECTION INTRAMUSCULAR; INTRAVENOUS; SUBCUTANEOUS AS NEEDED
Status: CANCELLED | OUTPATIENT
Start: 2021-01-27

## 2021-01-15 RX ORDER — HYDROCORTISONE SODIUM SUCCINATE 100 MG/2ML
100 INJECTION, POWDER, FOR SOLUTION INTRAMUSCULAR; INTRAVENOUS AS NEEDED
Status: CANCELLED | OUTPATIENT
Start: 2021-01-21

## 2021-01-15 NOTE — PROGRESS NOTES
Sanjiv Louis is a 63 y.o. female here for evaluation of anemia. Patient with no complaints of pain at this time.

## 2021-01-21 ENCOUNTER — HOSPITAL ENCOUNTER (OUTPATIENT)
Dept: INFUSION THERAPY | Age: 66
Discharge: HOME | End: 2021-01-21
Payer: COMMERCIAL

## 2021-01-21 VITALS
SYSTOLIC BLOOD PRESSURE: 123 MMHG | OXYGEN SATURATION: 98 % | DIASTOLIC BLOOD PRESSURE: 59 MMHG | TEMPERATURE: 97.3 F | HEART RATE: 69 BPM | RESPIRATION RATE: 18 BRPM

## 2021-01-21 DIAGNOSIS — D50.0 IRON DEFICIENCY ANEMIA DUE TO CHRONIC BLOOD LOSS: Primary | ICD-10-CM

## 2021-01-21 PROCEDURE — 74011250636 HC RX REV CODE- 250/636: Performed by: NURSE PRACTITIONER

## 2021-01-21 PROCEDURE — 96365 THER/PROPH/DIAG IV INF INIT: CPT

## 2021-01-21 PROCEDURE — 74011000258 HC RX REV CODE- 258: Performed by: NURSE PRACTITIONER

## 2021-01-21 RX ORDER — SODIUM CHLORIDE 0.9 % (FLUSH) 0.9 %
10 SYRINGE (ML) INJECTION AS NEEDED
Status: DISCONTINUED | OUTPATIENT
Start: 2021-01-21 | End: 2021-01-22 | Stop reason: HOSPADM

## 2021-01-21 RX ORDER — SODIUM CHLORIDE 9 MG/ML
25 INJECTION, SOLUTION INTRAVENOUS CONTINUOUS
Status: DISCONTINUED | OUTPATIENT
Start: 2021-01-21 | End: 2021-01-23 | Stop reason: HOSPADM

## 2021-01-21 RX ORDER — SODIUM CHLORIDE 9 MG/ML
10 INJECTION INTRAMUSCULAR; INTRAVENOUS; SUBCUTANEOUS AS NEEDED
Status: DISCONTINUED | OUTPATIENT
Start: 2021-01-21 | End: 2021-01-22 | Stop reason: HOSPADM

## 2021-01-21 RX ADMIN — FERRIC CARBOXYMALTOSE INJECTION 750 MG: 50 INJECTION, SOLUTION INTRAVENOUS at 13:55

## 2021-01-21 RX ADMIN — SODIUM CHLORIDE 25 ML/HR: 900 INJECTION, SOLUTION INTRAVENOUS at 13:52

## 2021-01-21 NOTE — PROGRESS NOTES
[facility] Progress Note    Date: 2021    Name: Jagdish Ward    MRN: [id number]         : 1955    Ms. Anabela Salmeron Arrived ambulatory and in no distress for Injectafer Infusion. Assessment was completed, no acute issues at this time, no new complaints voiced. 24 G PIV established to Right arm, + blood return. Covid questionnaire completed. 1. Do you have any symptoms of COVID-19? SOB, coughing, fever, or generally not feeling well - no    2. Have you been exposed to COVID-19 recently? - no    3. Have you had any recent contact with family/friend that has a pending COVID test? - no      Ms. Sepulveda's vitals were reviewed. Visit Vitals  BP (!) 123/59 (BP 1 Location: Right arm, BP Patient Position: At rest)   Pulse 69   Temp 97.3 °F (36.3 °C)   Resp 18   SpO2 98%       Medications:  Medications Administered     0.9% sodium chloride infusion     Admin Date  2021 Action  New Bag Dose  25 mL/hr Rate  25 mL/hr Route  IntraVENous Administered By  Mukund Simmons RN          ferric carboxymaltose (INJECTAFER) 750 mg in 0.9% sodium chloride 250 mL, overfill volume 25 mL IVPB     Admin Date  2021 Action  Given Dose  750 mg Rate  870 mL/hr Route  IntraVENous Administered By  Mukund Simmons RN                Ms. Anabela Salmeron tolerated treatment well and was discharged from [place] [address] in stable condition at 1430. PIV flushed & removed. She is to return on 2021 at 1430pm for her next appointment.     Ciera Cotter RN  2021

## 2021-01-27 ENCOUNTER — HOSPITAL ENCOUNTER (OUTPATIENT)
Dept: INFUSION THERAPY | Age: 66
Discharge: HOME | End: 2021-01-27
Payer: COMMERCIAL

## 2021-01-27 VITALS
DIASTOLIC BLOOD PRESSURE: 61 MMHG | SYSTOLIC BLOOD PRESSURE: 129 MMHG | TEMPERATURE: 97.3 F | HEART RATE: 67 BPM | RESPIRATION RATE: 18 BRPM

## 2021-01-27 DIAGNOSIS — D50.0 IRON DEFICIENCY ANEMIA DUE TO CHRONIC BLOOD LOSS: Primary | ICD-10-CM

## 2021-01-27 PROCEDURE — 74011000258 HC RX REV CODE- 258: Performed by: NURSE PRACTITIONER

## 2021-01-27 PROCEDURE — 74011250636 HC RX REV CODE- 250/636: Performed by: NURSE PRACTITIONER

## 2021-01-27 PROCEDURE — 96374 THER/PROPH/DIAG INJ IV PUSH: CPT

## 2021-01-27 RX ORDER — SODIUM CHLORIDE 9 MG/ML
25 INJECTION, SOLUTION INTRAVENOUS CONTINUOUS
Status: DISCONTINUED | OUTPATIENT
Start: 2021-01-27 | End: 2021-01-29 | Stop reason: HOSPADM

## 2021-01-27 RX ORDER — SODIUM CHLORIDE 0.9 % (FLUSH) 0.9 %
10 SYRINGE (ML) INJECTION AS NEEDED
Status: DISCONTINUED | OUTPATIENT
Start: 2021-01-27 | End: 2021-01-28 | Stop reason: HOSPADM

## 2021-01-27 RX ADMIN — FERRIC CARBOXYMALTOSE INJECTION 750 MG: 50 INJECTION, SOLUTION INTRAVENOUS at 12:54

## 2021-01-27 RX ADMIN — Medication 10 ML: at 12:40

## 2021-01-27 RX ADMIN — SODIUM CHLORIDE 25 ML/HR: 900 INJECTION, SOLUTION INTRAVENOUS at 12:40

## 2021-01-27 NOTE — PROGRESS NOTES
Outpatient Infusion Center Short Visit Progress Note    Patient admitted to [facility] for injectafer ambulatory in stable condition. Assessment completed. No new concerns voiced. Still having a lot of fatigue. Covid Screening      1. Do you have any symptoms of COVID-19? SOB, coughing, fever, or generally not feeling well ?no  2. Have you been exposed to COVID-19 recently? no  3. Have you had any recent contact with family/friend that has a pending COVID test? no    Vital Signs:  Visit Vitals  /70 (BP 1 Location: Right arm, BP Patient Position: At rest)   Pulse 68   Temp 97.3 °F (36.3 °C)   Resp 18         R arm PIV with positive blood return. Medications:  Medications Administered     0.9% sodium chloride infusion     Admin Date  01/27/2021 Action  New Bag Dose  25 mL/hr Rate  25 mL/hr Route  IntraVENous Administered By  Angelina Conde          ferric carboxymaltose (INJECTAFER) 750 mg in 0.9% sodium chloride 250 mL, overfill volume 25 mL IVPB     Admin Date  01/27/2021 Action  Given Dose  750 mg Rate  870 mL/hr Route  IntraVENous Administered By  Angelina Conde          sodium chloride (NS) flush 10 mL     Admin Date  01/27/2021 Action  Given Dose  10 mL Route  IntraVENous Administered By  Angelina Conde                Patient tolerated treatment well. Patient discharged from [place] [address] ambulatory in no distress. Patient aware of next appointment.     Yumiko Pedroza RN    Future Appointments   Date Time Provider Vane Grier   1/27/2021  2:30 PM SS INF2 CH4 <2H [facility]   3/25/2021  9:30 AM Thorn, Stacia Crigler, MD [facility] BS AMB

## 2021-03-18 LAB
BASOPHILS # BLD: 0.1 K/UL (ref 0–0.1)
BASOPHILS NFR BLD: 1 % (ref 0–1)
DIFFERENTIAL METHOD BLD: ABNORMAL
EOSINOPHIL # BLD: 0.1 K/UL (ref 0–0.4)
EOSINOPHIL NFR BLD: 1 % (ref 0–7)
ERYTHROCYTE [DISTWIDTH] IN BLOOD BY AUTOMATED COUNT: 21 % (ref 11.5–14.5)
FERRITIN SERPL-MCNC: 462 NG/ML (ref 26–388)
HCT VFR BLD AUTO: 41.8 % (ref 35–47)
HGB BLD-MCNC: 13.4 G/DL (ref 11.5–16)
IMM GRANULOCYTES # BLD AUTO: 0.1 K/UL (ref 0–0.04)
IMM GRANULOCYTES NFR BLD AUTO: 1 % (ref 0–0.5)
IRON SATN MFR SERPL: 54 % (ref 20–50)
IRON SERPL-MCNC: 128 UG/DL (ref 35–150)
LYMPHOCYTES # BLD: 1.4 K/UL (ref 0.8–3.5)
LYMPHOCYTES NFR BLD: 24 % (ref 12–49)
MCH RBC QN AUTO: 28 PG (ref 26–34)
MCHC RBC AUTO-ENTMCNC: 32.1 G/DL (ref 30–36.5)
MCV RBC AUTO: 87.4 FL (ref 80–99)
MONOCYTES # BLD: 0.3 K/UL (ref 0–1)
MONOCYTES NFR BLD: 6 % (ref 5–13)
NEUTS SEG # BLD: 3.8 K/UL (ref 1.8–8)
NEUTS SEG NFR BLD: 67 % (ref 32–75)
NRBC # BLD: 0 K/UL (ref 0–0.01)
NRBC BLD-RTO: 0 PER 100 WBC
PLATELET # BLD AUTO: 311 K/UL (ref 150–400)
PMV BLD AUTO: 11.6 FL (ref 8.9–12.9)
RBC # BLD AUTO: 4.78 M/UL (ref 3.8–5.2)
RBC MORPH BLD: ABNORMAL
RBC MORPH BLD: ABNORMAL
TIBC SERPL-MCNC: 236 UG/DL (ref 250–450)
WBC # BLD AUTO: 5.8 K/UL (ref 3.6–11)

## 2021-03-22 ENCOUNTER — OFFICE VISIT (OUTPATIENT)
Dept: ONCOLOGY | Age: 66
End: 2021-03-22
Payer: COMMERCIAL

## 2021-03-22 VITALS
OXYGEN SATURATION: 98 % | DIASTOLIC BLOOD PRESSURE: 83 MMHG | SYSTOLIC BLOOD PRESSURE: 132 MMHG | TEMPERATURE: 96.8 F | RESPIRATION RATE: 18 BRPM | BODY MASS INDEX: 25.49 KG/M2 | HEART RATE: 59 BPM | HEIGHT: 61 IN | WEIGHT: 135 LBS

## 2021-03-22 DIAGNOSIS — E53.8 VITAMIN B12 DEFICIENCY: ICD-10-CM

## 2021-03-22 DIAGNOSIS — E16.2 HYPOGLYCEMIA: ICD-10-CM

## 2021-03-22 DIAGNOSIS — Z98.84 HISTORY OF GASTRIC BYPASS: ICD-10-CM

## 2021-03-22 DIAGNOSIS — Z86.2 HISTORY OF IRON DEFICIENCY ANEMIA: Primary | ICD-10-CM

## 2021-03-22 DIAGNOSIS — D50.0 IRON DEFICIENCY ANEMIA DUE TO CHRONIC BLOOD LOSS: ICD-10-CM

## 2021-03-22 PROCEDURE — 99214 OFFICE O/P EST MOD 30 MIN: CPT | Performed by: INTERNAL MEDICINE

## 2021-03-22 RX ORDER — DICLOFENAC SODIUM 10 MG/G
GEL TOPICAL
COMMUNITY
Start: 2021-03-08 | End: 2021-10-26 | Stop reason: SDUPTHER

## 2021-03-22 RX ORDER — METHYLPREDNISOLONE 4 MG/1
TABLET ORAL
COMMUNITY
Start: 2021-03-09 | End: 2021-04-14

## 2021-03-22 RX ORDER — GABAPENTIN 300 MG/1
CAPSULE ORAL
COMMUNITY
Start: 2021-03-09 | End: 2021-04-14 | Stop reason: SDUPTHER

## 2021-03-22 NOTE — PROGRESS NOTES
Chief Complaint   Patient presents with   Cassi Jaffe is a pleasant 61year old male who presents today as a follow up for anemia.  She reports knee pain

## 2021-03-22 NOTE — PROGRESS NOTES
[address] [facility] Oncology at [facility]  [phone]    Hematology / Oncology Established Visit    Reason for Visit:   Mamadou Meyers is a 61 y.o. female who is seen for follow up of anemia. PCP is Dr. Lady Root. History of Present Illness:   Mamadou Meyers is a 61 y.o. female who is seen for evaluation of anemia. Medical history significant for gastric bypass, hypothyroidism and asthma. She had a knee replacement early December 2020. Reports blood loss during surgery. She was discharged on 12/7/20 with Hgb of 7.9. Recent labs on 1/15/21 show improvement in hgb to 9.5 but iron saturation remains low at 8. She is taking iron supplements once daily for the last month. She is also taking bariatric vitamins with iron. Taking stool softeners twice daily for constipation with iron supplements. Normal colonoscopy and endoscopy 7 years ago per patient Denies melena/hematochezia/epistaxis/hematuria. Reports fatigue, nausea, dizziness. Denies SOB. Reports ice cravings Denies CP, heart palpitations. Interval History:  Pt states she is feeling mildly improved energy, but not significant improvement. She received parenteral iron (Injectafer x 2) in 1/2021. She is taking oral B12, but no longer taking iron supplements due to constipation. She also notes episodes of hypoglycemia.      Past Medical History:   Diagnosis Date    Adverse effect of other narcotics, sequela     Most pills cause hallucination and nausea    Anxiety     Asthma     Benign heart murmur     Difficult intubation 02/16/2012    Patient states she has a small mouth    Hypertension     resolved with weight loss    Hypoglycemia 10/2020    Hypothyroidism     Iron deficiency anemia     Osteoarthritis     PONV (postoperative nausea and vomiting)     Psychiatric disorder     anxiety, depression    Snoring     Syncopal episodes     Post Gastric bypass- food helps    Urinary incontinence       Past Surgical History:   Procedure Laterality Date    HX CATARACT REMOVAL Bilateral  & 2012    HX CERVICAL FUSION      C2-7    HX  SECTION  X2    HX CHOLECYSTECTOMY  2018    HX GASTRIC BYPASS      HX HYSTERECTOMY  1992    HX KNEE ARTHROSCOPY Right 2008    HX TONSILLECTOMY      HX WISDOM TEETH EXTRACTION        Social History     Tobacco Use    Smoking status: Former Smoker     Packs/day: 0.25     Types: Cigarettes     Quit date: 2019     Years since quittin.2    Smokeless tobacco: Never Used   Substance Use Topics    Alcohol use: No     Alcohol/week: 0.0 standard drinks      Family History   Problem Relation Age of Onset    Heart Disease Mother     Post-op Nausea/Vomiting Mother     Cancer Father         PROSTATE TO SPINE    Clotting Disorder Neg Hx      Current Outpatient Medications   Medication Sig    ferrous sulfate 324 mg (65 mg iron) tablet Take  by mouth Daily (before breakfast).  aspirin delayed-release 81 mg tablet Take 1 Tab by mouth two (2) times a day.  ibuprofen (MOTRIN) 800 mg tablet Take 1 Tab by mouth every six (6) hours as needed for Pain.  pregabalin (Lyrica) 75 mg capsule Take 1 Cap by mouth daily (with dinner). Max Daily Amount: 75 mg.  acetaminophen (Acetaminophen Pain Relief) 500 mg tablet Take 2 Tabs by mouth every six (6) hours as needed for Pain.  ondansetron (ZOFRAN ODT) 8 mg disintegrating tablet Take 0.5 Tabs by mouth every eight (8) hours as needed for Nausea.  traZODone (DESYREL) 50 mg tablet Take 100 mg by mouth nightly.  acetaminophen (Tylenol Extra Strength) 500 mg tablet Take 1,000 mg by mouth every six (6) hours as needed for Pain.  busPIRone (BUSPAR) 7.5 mg tablet Take 1 Tab by mouth two (2) times daily as needed (anxiety).  escitalopram oxalate (LEXAPRO) 20 mg tablet TAKE 1 TABLET BY MOUTH ONCE DAILY     No current facility-administered medications for this visit.        Allergies   Allergen Reactions    Nsaids (Non-Steroidal Anti-Inflammatory Drug) Other (comments)     Gastric bypass        Review of Systems: A complete review of systems was obtained, negative except as described above. Physical Exam:     Visit Vitals  /83   Pulse (!) 59   Temp 96.8 °F (36 °C)   Resp 18   Ht 5' 1\" (1.549 m)   Wt 135 lb (61.2 kg)   SpO2 98%   BMI 25.51 kg/m²       ECOG PS: 1  General: Well developed, no acute distress  Eyes: Anicteric sclerae  HENT: Atraumatic, OP clear  Neck: Supple  Lymphatic: No cervical, supraclavicular, axillary LAD  Respiratory: CTAB, normal respiratory effort  CV: Normal rate, regular rhythm, no murmurs, no peripheral edema  GI: Soft, nontender, nondistended, no masses  MS: Anatalgic gait. Digits without clubbing or cyanosis. Skin: No rashes, ecchymoses, or petechiae. Normal temperature, turgor, and texture. Neuro/Psych: Alert, oriented. Moving all 4 extremities. Appropriate affect, normal judgment/insight. Results:     Lab Results   Component Value Date/Time    WBC 5.8 03/18/2021 12:13 PM    HGB 13.4 03/18/2021 12:13 PM    HCT 41.8 03/18/2021 12:13 PM    PLATELET 378 45/83/5935 12:13 PM    MCV 87.4 03/18/2021 12:13 PM    ABS. NEUTROPHILS 3.8 03/18/2021 12:13 PM     Lab Results   Component Value Date/Time    Sodium 138 01/14/2021 12:00 PM    Potassium 4.6 01/14/2021 12:00 PM    Chloride 105 01/14/2021 12:00 PM    CO2 28 01/14/2021 12:00 PM    Glucose 70 01/14/2021 12:00 PM    BUN 20 01/14/2021 12:00 PM    Creatinine 0.54 (L) 01/14/2021 12:00 PM    GFR est AA >60 01/14/2021 12:00 PM    GFR est non-AA >60 01/14/2021 12:00 PM    Calcium 8.8 01/14/2021 12:00 PM    Glucose (POC) 83 12/05/2020 10:11 AM     Lab Results   Component Value Date/Time    Bilirubin, total 0.3 01/14/2021 12:00 PM    ALT (SGPT) 21 01/14/2021 12:00 PM    Alk.  phosphatase 63 01/14/2021 12:00 PM    Protein, total 6.2 (L) 01/14/2021 12:00 PM    Albumin 3.7 01/14/2021 12:00 PM    Globulin 2.5 01/14/2021 12:00 PM     Lab Results   Component Value Date/Time    Iron 128 03/18/2021 12:13 PM    TIBC 236 (L) 03/18/2021 12:13 PM    Iron % saturation 54 (H) 03/18/2021 12:13 PM    Ferritin 462 (H) 03/18/2021 12:13 PM       Lab Results   Component Value Date/Time    Vitamin B12 294 05/22/2020 09:45 AM    Folate >19.9 11/02/2015 03:41 PM     Lab Results   Component Value Date/Time    TSH 1.03 01/14/2021 12:00 PM     Lab Results   Component Value Date/Time    HEP C VIRUS AB <0.1 11/02/2015 03:41 PM         Imaging:     Radiology report(s) reviewed. .    Assessment & Plan:   Charlie Dsouza is a 61 y.o. female is seen for anemia. 1. History of Iron deficiency anemia / history of gastric bypass: Improved after Injectafer x 2 in late Jan 2021. Was likely 2/2 to blood loss during prior knee surgery and malabsorption related to gastric bypass. Hgb after surgery 12/2020 dropped to 7.9, then improved to 9.5 on 1/15/21. Hgb and iron profile improved per 3/2021 labs. -- Return in 6 months for repeat labs and follow up    2. Vitamin B12 deficiency / h/o gastric bypass: 5/22/20 B12 level 294. Per patient she stopped taking B12 supplements b/c worried about interaction w lexapro. No interaction listed and pt advised to restart. -- Continue cyanocobalamin 1000mcg daily    3. H/o hypoglycemia: Pt reports recurrent episodes. Urged pt to contact her PCP regarding this as hypoglycemia can be dangerous. I appreciate the opportunity to participate in Ms. Kriss Sepulveda's care.     Signed By: Anabel Fisher MD     March 22, 2021

## 2021-04-14 ENCOUNTER — OFFICE VISIT (OUTPATIENT)
Dept: FAMILY MEDICINE CLINIC | Age: 66
End: 2021-04-14
Payer: COMMERCIAL

## 2021-04-14 VITALS
OXYGEN SATURATION: 99 % | HEART RATE: 63 BPM | BODY MASS INDEX: 25.3 KG/M2 | WEIGHT: 134 LBS | TEMPERATURE: 97.1 F | DIASTOLIC BLOOD PRESSURE: 64 MMHG | SYSTOLIC BLOOD PRESSURE: 104 MMHG | HEIGHT: 61 IN | RESPIRATION RATE: 14 BRPM

## 2021-04-14 DIAGNOSIS — E16.2 HYPOGLYCEMIA: ICD-10-CM

## 2021-04-14 DIAGNOSIS — I10 HTN (HYPERTENSION), MALIGNANT: ICD-10-CM

## 2021-04-14 DIAGNOSIS — E03.1 CONGENITAL HYPOTHYROIDISM WITHOUT GOITER: ICD-10-CM

## 2021-04-14 DIAGNOSIS — E55.9 VITAMIN D DEFICIENCY: ICD-10-CM

## 2021-04-14 DIAGNOSIS — Z96.651 S/P TKR (TOTAL KNEE REPLACEMENT), RIGHT: ICD-10-CM

## 2021-04-14 DIAGNOSIS — Z98.84 S/P BARIATRIC SURGERY: ICD-10-CM

## 2021-04-14 DIAGNOSIS — E16.2 HYPOGLYCEMIA: Primary | ICD-10-CM

## 2021-04-14 DIAGNOSIS — D50.0 IRON DEFICIENCY ANEMIA DUE TO CHRONIC BLOOD LOSS: ICD-10-CM

## 2021-04-14 PROCEDURE — 99214 OFFICE O/P EST MOD 30 MIN: CPT | Performed by: FAMILY MEDICINE

## 2021-04-14 RX ORDER — FLASH GLUCOSE SENSOR
2 KIT MISCELLANEOUS
Qty: 2 KIT | Refills: 5 | Status: SHIPPED | OUTPATIENT
Start: 2021-04-14 | End: 2021-04-14 | Stop reason: SDUPTHER

## 2021-04-14 RX ORDER — FLASH GLUCOSE SENSOR
2 KIT MISCELLANEOUS
Qty: 2 KIT | Refills: 5 | Status: SHIPPED | OUTPATIENT
Start: 2021-04-14 | End: 2021-08-05 | Stop reason: ALTCHOICE

## 2021-04-14 RX ORDER — GABAPENTIN 300 MG/1
CAPSULE ORAL
Qty: 90 CAP | Refills: 5 | Status: SHIPPED | OUTPATIENT
Start: 2021-04-14 | End: 2021-07-09 | Stop reason: SDUPTHER

## 2021-04-14 RX ORDER — FLASH GLUCOSE SCANNING READER
EACH MISCELLANEOUS
Qty: 1 EACH | Refills: 0 | Status: SHIPPED | OUTPATIENT
Start: 2021-04-14 | End: 2021-04-14 | Stop reason: SDUPTHER

## 2021-04-14 RX ORDER — FLASH GLUCOSE SCANNING READER
EACH MISCELLANEOUS
Qty: 1 EACH | Refills: 0 | Status: SHIPPED | OUTPATIENT
Start: 2021-04-14 | End: 2022-02-24

## 2021-04-14 NOTE — PROGRESS NOTES
Chief Complaint   Patient presents with    Blood sugar problem     R/T Bariatric surgery 2013    Labs     coffee today w/ protein     Altered mental status     Brain fog/jittery: loss of conciousness     1. Have you been to the ER, urgent care clinic since your last visit? Hospitalized since your last visit? No    2. Have you seen or consulted any other health care providers outside of the [address] [place] [address] since your last visit? Include any pap smears or colon screening. No             Chief Complaint   Patient presents with    Blood sugar problem     R/T Bariatric surgery 2013    Labs     coffee today w/ protein     Altered mental status     Brain fog/jittery: loss of conciousness     She is a 61 y.o. female who presents for evalution. Reviewed PmHx, RxHx, FmHx, SocHx, AllgHx and updated and dated in the chart. Patient Active Problem List    Diagnosis    Iron deficiency anemia due to chronic blood loss    S/P TKR (total knee replacement), right    Arthritis of right knee    Confusion    Insomnia    Primary osteoarthritis of right knee    Anemia    S/P bariatric surgery    Hyperglycemia    Cervical stenosis of spine    Vitamin D deficiency    Hypothyroid    Osteoarthritis    Asthma    Depression    HTN (hypertension), malignant       Review of Systems - negative except as listed above in the HPI    Objective:     Vitals:    04/14/21 0816   BP: 104/64   Pulse: 63   Resp: 14   Temp: 97.1 °F (36.2 °C)   TempSrc: Oral   SpO2: 99%   Weight: 134 lb (60.8 kg)   Height: 5' 1\" (1.549 m)     Physical Examination: General appearance - alert, well appearing, and in no distress  R knee with swelling     Assessment/ Plan:   Diagnoses and all orders for this visit:    1. Hypoglycemia  -     flash glucose scanning reader (FreeStyle Danna 14 Day [place]) misc;  Check sugars  -     flash glucose sensor (FreeStyle Danna 14 Day Sensor) kit; 2 Each by Does Not Apply route every fourteen (14) days.  -dwp need to increase carbs from 20 to 60 per day    2. HTN (hypertension), malignant  -at goal    3. Congenital hypothyroidism without goiter  -labs utd and at goal    4. S/P bariatric surgery  -stable    5. Iron deficiency anemia due to chronic blood loss  -HH  Better after iron transfusion    6. Vitamin D deficiency    7. S/P TKR (total knee replacement), right  -     gabapentin (NEURONTIN) 300 mg capsule; TAKE 1 CAPSULE BY MOUTH THREE TIMES DAILY AS NEEDED FOR PAIN             I have discussed the diagnosis with the patient and the intended plan as seen in the above orders. The patient understands and agrees with the plan. The patient has received an after-visit summary and questions were answered concerning future plans. Medication Side Effects and Warnings were discussed with patient  Patient Labs were reviewed and or requested:  Patient Past Records were reviewed and or requested    Trung Ch M.D. There are no Patient Instructions on file for this visit.

## 2021-04-15 ENCOUNTER — TELEPHONE (OUTPATIENT)
Dept: FAMILY MEDICINE CLINIC | Age: 66
End: 2021-04-15

## 2021-04-15 DIAGNOSIS — F32.A DEPRESSION, UNSPECIFIED DEPRESSION TYPE: ICD-10-CM

## 2021-04-15 PROBLEM — E16.2 HYPOGLYCEMIA: Status: ACTIVE | Noted: 2021-04-15

## 2021-04-15 RX ORDER — ESCITALOPRAM OXALATE 20 MG/1
TABLET ORAL
Qty: 90 TAB | Refills: 1 | Status: SHIPPED | OUTPATIENT
Start: 2021-04-15 | End: 2021-04-19 | Stop reason: SDUPTHER

## 2021-04-15 NOTE — TELEPHONE ENCOUNTER
PA for [facility] reader & sensor denied- states pt must used insulin at least 3 or more times a day, have to test blood glucose at least 3 or more times a day with insulin usage. Preferred devices are One-touch Ultra or Verio. Copy placed in scan folder to be scanned to chart.

## 2021-04-15 NOTE — TELEPHONE ENCOUNTER
Returned call to patient. She states insurance wont cover [facility]. Informed her that [employer] is only covered for patient with insulin. Patient states they would cover One Touch meter . One touch meter and test strips faxed to [address] [place] [address]. Patient also discussed eed for endocrinology. Dr. Ramírez Better number given to patient. Order for referral faxed to Dr. Qi Anthony.

## 2021-04-15 NOTE — TELEPHONE ENCOUNTER
Patient would like to discuss [employer] [place].  She would need a PA but would like to ask a few questions first. Please call patient at: [phone]

## 2021-04-19 ENCOUNTER — TELEPHONE (OUTPATIENT)
Dept: FAMILY MEDICINE CLINIC | Age: 66
End: 2021-04-19

## 2021-04-19 DIAGNOSIS — F32.A DEPRESSION, UNSPECIFIED DEPRESSION TYPE: ICD-10-CM

## 2021-04-19 DIAGNOSIS — E03.1 CONGENITAL HYPOTHYROIDISM WITHOUT GOITER: Primary | ICD-10-CM

## 2021-04-19 RX ORDER — ESCITALOPRAM OXALATE 20 MG/1
TABLET ORAL
Qty: 90 TAB | Refills: 1 | Status: SHIPPED | OUTPATIENT
Start: 2021-04-19 | End: 2021-07-09 | Stop reason: SDUPTHER

## 2021-04-19 NOTE — TELEPHONE ENCOUNTER
Pt wants a written RX for [employer] 2  [facility] [employer]  [place] pt wants one copy to the pharm and one mailed to her please  advise

## 2021-04-20 ENCOUNTER — TELEPHONE (OUTPATIENT)
Dept: FAMILY MEDICINE CLINIC | Age: 66
End: 2021-04-20

## 2021-04-20 NOTE — TELEPHONE ENCOUNTER
Pt wants a written RX for [employer] 2  [facility]  [place] pt wants one copy to the pharm and one mailed to her please advise pt will pay out of packet

## 2021-04-20 NOTE — TELEPHONE ENCOUNTER
rx for zeke 2 faxed to [facility] and originals mailed to patient. She will try to find somewhere cheaper due to paying out of pocket.

## 2021-04-21 ENCOUNTER — TRANSCRIBE ORDER (OUTPATIENT)
Dept: SCHEDULING | Age: 66
End: 2021-04-21

## 2021-04-21 DIAGNOSIS — M43.16 SPONDYLOLISTHESIS AT L4-L5 LEVEL: ICD-10-CM

## 2021-04-21 DIAGNOSIS — M47.26 OTHER SPONDYLOSIS WITH RADICULOPATHY, LUMBAR REGION: Primary | ICD-10-CM

## 2021-07-09 ENCOUNTER — TELEPHONE (OUTPATIENT)
Dept: FAMILY MEDICINE CLINIC | Age: 66
End: 2021-07-09

## 2021-07-09 DIAGNOSIS — Z96.651 S/P TKR (TOTAL KNEE REPLACEMENT), RIGHT: ICD-10-CM

## 2021-07-09 DIAGNOSIS — F32.A DEPRESSION, UNSPECIFIED DEPRESSION TYPE: ICD-10-CM

## 2021-07-09 RX ORDER — TRAZODONE HYDROCHLORIDE 50 MG/1
100 TABLET ORAL
Qty: 90 TABLET | Refills: 1 | Status: SHIPPED | OUTPATIENT
Start: 2021-07-09 | End: 2021-11-04 | Stop reason: SDUPTHER

## 2021-07-09 RX ORDER — GABAPENTIN 300 MG/1
CAPSULE ORAL
Qty: 90 CAPSULE | Refills: 5 | Status: SHIPPED | OUTPATIENT
Start: 2021-07-09 | End: 2021-07-26 | Stop reason: SDUPTHER

## 2021-07-09 RX ORDER — ESCITALOPRAM OXALATE 20 MG/1
TABLET ORAL
Qty: 90 TABLET | Refills: 1 | Status: SHIPPED | OUTPATIENT
Start: 2021-07-09 | End: 2021-11-04 | Stop reason: SDUPTHER

## 2021-07-09 NOTE — TELEPHONE ENCOUNTER
Labs faxed over [facility] 1/14/21 visit as requested.

## 2021-07-09 NOTE — TELEPHONE ENCOUNTER
Patient requesting labs sent to Dr Sol/Fax: [phone] for an appointment 07.13.2021.  Patient's phone: [phone]

## 2021-07-26 ENCOUNTER — OFFICE VISIT (OUTPATIENT)
Dept: FAMILY MEDICINE CLINIC | Age: 66
End: 2021-07-26
Payer: COMMERCIAL

## 2021-07-26 VITALS
TEMPERATURE: 98 F | OXYGEN SATURATION: 97 % | RESPIRATION RATE: 20 BRPM | BODY MASS INDEX: 24.73 KG/M2 | SYSTOLIC BLOOD PRESSURE: 100 MMHG | HEIGHT: 61 IN | WEIGHT: 131 LBS | HEART RATE: 78 BPM | DIASTOLIC BLOOD PRESSURE: 68 MMHG

## 2021-07-26 DIAGNOSIS — I10 HTN (HYPERTENSION), MALIGNANT: Primary | ICD-10-CM

## 2021-07-26 DIAGNOSIS — D50.0 IRON DEFICIENCY ANEMIA DUE TO CHRONIC BLOOD LOSS: ICD-10-CM

## 2021-07-26 DIAGNOSIS — E03.1 CONGENITAL HYPOTHYROIDISM WITHOUT GOITER: ICD-10-CM

## 2021-07-26 DIAGNOSIS — Z96.651 S/P TKR (TOTAL KNEE REPLACEMENT), RIGHT: ICD-10-CM

## 2021-07-26 PROCEDURE — 99214 OFFICE O/P EST MOD 30 MIN: CPT | Performed by: FAMILY MEDICINE

## 2021-07-26 RX ORDER — GABAPENTIN 300 MG/1
CAPSULE ORAL
Qty: 90 CAPSULE | Refills: 5 | Status: SHIPPED | OUTPATIENT
Start: 2021-07-26 | End: 2022-06-24

## 2021-07-26 NOTE — PROGRESS NOTES
Patient here for fasting labs. 1. Have you been to the ER, urgent care clinic since your last visit? Hospitalized since your last visit? No    2. Have you seen or consulted any other health care providers outside of the [address] [place] [address] since your last visit? Include any pap smears or colon screening. No              Chief Complaint   Patient presents with    Labs     fasting    Referral Follow Up     has appt with bob for 8/02/2021     She is a 61 y.o. female who presents for evalution. Reviewed PmHx, RxHx, FmHx, SocHx, AllgHx and updated and dated in the chart. Patient Active Problem List    Diagnosis    Hypoglycemia    Iron deficiency anemia due to chronic blood loss    S/P TKR (total knee replacement), right    Arthritis of right knee    Confusion    Insomnia    Primary osteoarthritis of right knee    Anemia    S/P bariatric surgery    Hyperglycemia    Cervical stenosis of spine    Vitamin D deficiency    Hypothyroid    Osteoarthritis    Asthma    Depression    HTN (hypertension), malignant       Review of Systems - negative except as listed above in the HPI    Objective:     Vitals:    07/26/21 0927   BP: 100/68   Pulse: 78   Resp: 20   Temp: 98 °F (36.7 °C)   SpO2: 97%   Weight: 131 lb (59.4 kg)   Height: 5' 1\" (1.549 m)         Assessment/ Plan:   Diagnoses and all orders for this visit:    1. HTN (hypertension), malignant  -     LIPID PANEL; Future  -     METABOLIC PANEL, COMPREHENSIVE; Future  -at goal    2. S/P TKR (total knee replacement), right  -     gabapentin (NEURONTIN) 300 mg capsule; TAKE 1 CAPSULE BY MOUTH THREE TIMES DAILY AS NEEDED FOR PAIN  -     LIPID PANEL; Future    3. Congenital hypothyroidism without goiter  -     LIPID PANEL; Future  -     TSH 3RD GENERATION; Future  -     HEMOGLOBIN A1C WITH EAG; Future  -     VITAMIN D, 25 HYDROXY; Future  -     VITAMIN B12; Future  -going to see Endo and on new diet    4.  Iron deficiency anemia due to chronic blood loss  -     CBC WITH AUTOMATED DIFF; Future  -     IRON PROFILE; Future  -     FERRITIN; Future             I have discussed the diagnosis with the patient and the intended plan as seen in the above orders. The patient understands and agrees with the plan. The patient has received an after-visit summary and questions were answered concerning future plans. Medication Side Effects and Warnings were discussed with patient  Patient Labs were reviewed and or requested:  Patient Past Records were reviewed and or requested    Amalia Wise M.D. There are no Patient Instructions on file for this visit.

## 2021-07-27 LAB
25(OH)D3 SERPL-MCNC: 30 NG/ML (ref 30–100)
ALBUMIN SERPL-MCNC: 4.2 G/DL (ref 3.5–5)
ALBUMIN/GLOB SERPL: 1.7 {RATIO} (ref 1.1–2.2)
ALP SERPL-CCNC: 56 U/L (ref 45–117)
ALT SERPL-CCNC: 83 U/L (ref 12–78)
ANION GAP SERPL CALC-SCNC: 4 MMOL/L (ref 5–15)
AST SERPL-CCNC: 44 U/L (ref 15–37)
BASOPHILS # BLD: 0.1 K/UL (ref 0–0.1)
BASOPHILS NFR BLD: 2 % (ref 0–1)
BILIRUB SERPL-MCNC: 0.4 MG/DL (ref 0.2–1)
BUN SERPL-MCNC: 17 MG/DL (ref 6–20)
BUN/CREAT SERPL: 27 (ref 12–20)
CALCIUM SERPL-MCNC: 9.6 MG/DL (ref 8.5–10.1)
CHLORIDE SERPL-SCNC: 108 MMOL/L (ref 97–108)
CHOLEST SERPL-MCNC: 163 MG/DL
CO2 SERPL-SCNC: 29 MMOL/L (ref 21–32)
CREAT SERPL-MCNC: 0.62 MG/DL (ref 0.55–1.02)
DIFFERENTIAL METHOD BLD: ABNORMAL
EOSINOPHIL # BLD: 0.1 K/UL (ref 0–0.4)
EOSINOPHIL NFR BLD: 3 % (ref 0–7)
ERYTHROCYTE [DISTWIDTH] IN BLOOD BY AUTOMATED COUNT: 14.6 % (ref 11.5–14.5)
EST. AVERAGE GLUCOSE BLD GHB EST-MCNC: 97 MG/DL
FERRITIN SERPL-MCNC: 240 NG/ML (ref 26–388)
GLOBULIN SER CALC-MCNC: 2.5 G/DL (ref 2–4)
GLUCOSE SERPL-MCNC: 79 MG/DL (ref 65–100)
HBA1C MFR BLD: 5 % (ref 4–5.6)
HCT VFR BLD AUTO: 41.8 % (ref 35–47)
HDLC SERPL-MCNC: 71 MG/DL
HDLC SERPL: 2.3 {RATIO} (ref 0–5)
HGB BLD-MCNC: 13 G/DL (ref 11.5–16)
IMM GRANULOCYTES # BLD AUTO: 0 K/UL (ref 0–0.04)
IMM GRANULOCYTES NFR BLD AUTO: 0 % (ref 0–0.5)
IRON SATN MFR SERPL: 33 % (ref 20–50)
IRON SERPL-MCNC: 85 UG/DL (ref 35–150)
LDLC SERPL CALC-MCNC: 84.2 MG/DL (ref 0–100)
LYMPHOCYTES # BLD: 1.2 K/UL (ref 0.8–3.5)
LYMPHOCYTES NFR BLD: 28 % (ref 12–49)
MCH RBC QN AUTO: 30.4 PG (ref 26–34)
MCHC RBC AUTO-ENTMCNC: 31.1 G/DL (ref 30–36.5)
MCV RBC AUTO: 97.7 FL (ref 80–99)
MONOCYTES # BLD: 0.3 K/UL (ref 0–1)
MONOCYTES NFR BLD: 6 % (ref 5–13)
NEUTS SEG # BLD: 2.5 K/UL (ref 1.8–8)
NEUTS SEG NFR BLD: 61 % (ref 32–75)
NRBC # BLD: 0 K/UL (ref 0–0.01)
NRBC BLD-RTO: 0 PER 100 WBC
PLATELET # BLD AUTO: 186 K/UL (ref 150–400)
POTASSIUM SERPL-SCNC: 5 MMOL/L (ref 3.5–5.1)
PROT SERPL-MCNC: 6.7 G/DL (ref 6.4–8.2)
RBC # BLD AUTO: 4.28 M/UL (ref 3.8–5.2)
SODIUM SERPL-SCNC: 141 MMOL/L (ref 136–145)
TIBC SERPL-MCNC: 254 UG/DL (ref 250–450)
TRIGL SERPL-MCNC: 39 MG/DL (ref ?–150)
TSH SERPL DL<=0.05 MIU/L-ACNC: 1.29 UIU/ML (ref 0.36–3.74)
VIT B12 SERPL-MCNC: 316 PG/ML (ref 193–986)
VLDLC SERPL CALC-MCNC: 7.8 MG/DL
WBC # BLD AUTO: 4.2 K/UL (ref 3.6–11)

## 2021-07-28 NOTE — PROGRESS NOTES
Spoke with pt, she is aware of her labs and verbalized understanding.  She wants her labs faxed to dr Tristian Drummond at [phone] so that he has a copy of the results

## 2021-08-05 ENCOUNTER — TELEPHONE (OUTPATIENT)
Dept: FAMILY MEDICINE CLINIC | Age: 66
End: 2021-08-05

## 2021-08-05 ENCOUNTER — OFFICE VISIT (OUTPATIENT)
Dept: ENDOCRINOLOGY | Age: 66
End: 2021-08-05
Payer: COMMERCIAL

## 2021-08-05 VITALS
OXYGEN SATURATION: 96 % | SYSTOLIC BLOOD PRESSURE: 112 MMHG | WEIGHT: 132 LBS | RESPIRATION RATE: 18 BRPM | DIASTOLIC BLOOD PRESSURE: 63 MMHG | HEIGHT: 61 IN | HEART RATE: 74 BPM | TEMPERATURE: 98.2 F | BODY MASS INDEX: 24.92 KG/M2

## 2021-08-05 DIAGNOSIS — E16.2 HYPOGLYCEMIA: Primary | ICD-10-CM

## 2021-08-05 PROCEDURE — 99204 OFFICE O/P NEW MOD 45 MIN: CPT | Performed by: INTERNAL MEDICINE

## 2021-08-05 RX ORDER — FLASH GLUCOSE SENSOR
KIT MISCELLANEOUS
Qty: 2 KIT | Refills: 5 | Status: SHIPPED | OUTPATIENT
Start: 2021-08-05 | End: 2022-06-24

## 2021-08-05 RX ORDER — BLOOD-GLUCOSE METER
EACH MISCELLANEOUS
Qty: 1 EACH | Refills: 0
Start: 2021-08-05 | End: 2021-11-02

## 2021-08-05 RX ORDER — BLOOD SUGAR DIAGNOSTIC
STRIP MISCELLANEOUS
Qty: 10 STRIP | Refills: 0
Start: 2021-08-05 | End: 2021-11-02

## 2021-08-05 NOTE — PROGRESS NOTES
HISTORY OF PRESENT ILLNESS  Silver High is a 61 y.o. female.   HPI  Initial visit for  Hypoglycemias     She is referred by pcp  Supposed to see Dr. Radha Samano       As patient required immediate help, she is changed to my schedule     S/p bariatric surgery  In  2013 @ [facility]   By Dr. Rocio Estrella   5 years after surgery  - she started  Noticing hypoglycemias     The hypoglycemias as happening more often and are going to as low as 45 mg   She is afraid that her  cannot be of help because of the stroke he had and is non verbal     She is on Danna 2 - she notices the discrepancy and hopes to get dexcom         Past Medical History:   Diagnosis Date    Adverse effect of other narcotics, sequela     Most pills cause hallucination and nausea    Anxiety     Asthma     Benign heart murmur     Difficult intubation 2012    Patient states she has a small mouth    Hypertension     resolved with weight loss    Hypoglycemia 10/2020    Hypothyroidism     Iron deficiency anemia     Osteoarthritis     PONV (postoperative nausea and vomiting)     Psychiatric disorder     anxiety, depression    Snoring     Syncopal episodes     Post Gastric bypass- food helps    Urinary incontinence        Social History     Socioeconomic History    Marital status:      Spouse name: Not on file    Number of children: Not on file    Years of education: Not on file    Highest education level: Not on file   Occupational History    Not on file   Tobacco Use    Smoking status: Former Smoker     Packs/day: 0.25     Types: Cigarettes     Quit date: 2019     Years since quittin.6    Smokeless tobacco: Never Used   Substance and Sexual Activity    Alcohol use: No     Alcohol/week: 0.0 standard drinks    Drug use: No    Sexual activity: Not on file   Other Topics Concern    Not on file   Social History Narrative    Not on file     Social Determinants of Health     Financial Resource Strain:    [facility] Difficulty of Paying Living Expenses:    Food Insecurity:     Worried About Running Out of Food in the Last Year:     [address] in the Last Year:    Transportation Needs:     Lack of Transportation (Medical):  Lack of Transportation (Non-Medical):    Physical Activity:     Days of Exercise per Week:     Minutes of Exercise per Session:    Stress:     Feeling of Stress :    Social Connections:     Frequency of Communication with Friends and Family:     Frequency of Social Gatherings with Friends and Family:     Attends Synagogue Services:     Active Member of Clubs or Organizations:     Attends Club or Organization Meetings:     Marital Status:    Intimate Partner Violence:     Fear of Current or Ex-Partner:     Emotionally Abused:     Physically Abused:     Sexually Abused:        Family History   Problem Relation Age of Onset    Heart Disease Mother     Post-op Nausea/Vomiting Mother     Cancer Father         PROSTATE TO SPINE    Clotting Disorder Neg Hx                Review of Systems   Constitutional: Negative. HENT: Negative. Eyes: Negative for pain and redness. Respiratory: Negative. Cardiovascular: Negative for chest pain, palpitations and leg swelling. Gastrointestinal: Negative. Negative for constipation. Genitourinary: Negative. Musculoskeletal: Negative for myalgias. Skin: Negative. Neurological: Negative. Endo/Heme/Allergies: Negative. Psychiatric/Behavioral: Negative for depression and memory loss. The patient does not have insomnia. Physical Exam  Constitutional:       Appearance: She is well-developed. HENT:      Head: Normocephalic. Eyes:      Conjunctiva/sclera: Conjunctivae normal.      Pupils: Pupils are equal, round, and reactive to light. Neck:      Thyroid: No thyromegaly. Vascular: No JVD. Trachea: No tracheal deviation. Cardiovascular:      Rate and Rhythm: Normal rate and regular rhythm.       Heart sounds: Normal heart sounds. No murmur heard. Pulmonary:      Breath sounds: Normal breath sounds. Abdominal:      General: Bowel sounds are normal.      Palpations: Abdomen is soft. Musculoskeletal:         General: Normal range of motion. Cervical back: Normal range of motion and neck supple. Lymphadenopathy:      Cervical: No cervical adenopathy. Skin:     General: Skin is warm. Neurological:      Mental Status: She is alert and oriented to person, place, and time. Deep Tendon Reflexes: Reflexes are normal and symmetric. ASSESSMENT and PLAN      1. Hypoglycemia - s/p gastric bypass   She is on  \" purple diet \"  Shibboleth  , 20 gm of carbs ; 80 to 90 gm protein    Reviewed the zeke readings     likely reactive , but she is on good diet form discussion. I reviewed above diet and asked her to exclude a few fruits and peanut butter   She will maintain a journal   To check at pharmacy - if she got basic or zeke 2 sensors   - discussed over reading of lows   dexcom can be considered for accuracy   -  Suggested use of meds - acarbose, metformin and trulicity    Each one be tried at a time and we gave trulicity the first chance   - do fasting  Labs       2. S/p gastric bypass surgery     3.   Educated on hypoglycemia management        Reviewed results with patient and discussed the labs being ordered today/bnv  Patient voiced understanding of plan of care

## 2021-08-05 NOTE — PROGRESS NOTES
Karime Blancas is a 61 y.o. female here for   Chief Complaint   Patient presents with    New Patient     referred for Thyroid and Hypoglycemia       1. Have you been to the ER, urgent care clinic since your last visit? Hospitalized since your last visit? -n/a    2. Have you seen or consulted any other health care providers outside of the [address] [place] [address] since your last visit?   Include any pap smears or colon screening.-n/a

## 2021-08-05 NOTE — PATIENT INSTRUCTIONS
SPECIFIC INSTRUCTIONS BELOW     Start on trulicity  0.31  Mg once a week ( voucher )        -------------PAY ATTENTION TO THESE GENERAL INSTRUCTIONS -----------------    -ANY tests other than blood work, which you opt to do  outside the  [facility] imaging facilities, you are responsible for prior authorizations if  required    - 18 [address] UP TO DATE ON YOUR AVS- PLEASE IGNORE   - YOUR MED LIST IS NOT UP TO DATE AS SOME CHANGES ARE BEING MADE AFTER THE VISIT - [address]     Results     *Normal results will not be notified by a phone call starting January 1 2021   *If you have an upcoming visit, the results will be discussed at the visit   *Please sign up for MY CHART if you want access to your lab and test results  *Abnormal results which require immediate attention will be notified by phone call   *Abnormal results which do not require immediate assistance will be notified in 1-2 weeks       Refills    -    have your pharmacy send us a refill request . Refills are done max for one year and a visit is a must before refills are extended    Follow up appointments -  highly encourage you to make it when you are checking out. We can accommodate you into the schedule based on your clinical situation, but not for extending refills beyond a year. Labs are important to give refills and is important to get labs before the visit     Phone calls  -  Allow  24 hrs.  for non-urgent calls to be returned  Prior authorization - It may take 2-4 weeks to process  Forms  -  FMLA, DMV etc., will take up to 2 weeks to process  Cancellations - please notify the office 2 days in advance   Samples  - will only be dispensed at visits       If not showing for the appointments and cancelling appointments within 24 hours are kept track of and three  of such situations in  two consecutive years will likely be considered for termination from the practice    -------------------------------------------------------------------------------------------------------------------

## 2021-08-05 NOTE — LETTER
8/8/2021    Patient: Moo Carpenter   YOB: 1955   Date of Visit: 8/5/2021     Radha Menard, [address] [address]  [place] [facility] [address]    Dear Radha Menard MD,      Thank you for referring Ms. Naty Thomas to [address] [place] [address] for evaluation. My notes for this consultation are attached. If you have questions, please do not hesitate to call me. I look forward to following your patient along with you.       Sincerely,    Layla Fitch MD

## 2021-08-06 RX ORDER — DULAGLUTIDE 0.75 MG/.5ML
0.75 INJECTION, SOLUTION SUBCUTANEOUS
Qty: 4 PEN | Refills: 6 | Status: SHIPPED | OUTPATIENT
Start: 2021-08-06 | End: 2021-12-13

## 2021-08-08 ENCOUNTER — DOCUMENTATION ONLY (OUTPATIENT)
Dept: ENDOCRINOLOGY | Age: 66
End: 2021-08-08

## 2021-08-12 LAB
ALBUMIN SERPL-MCNC: 4.3 G/DL (ref 3.8–4.8)
ALBUMIN/GLOB SERPL: 2.7 {RATIO} (ref 1.2–2.2)
ALP SERPL-CCNC: 58 IU/L (ref 48–121)
ALT SERPL-CCNC: 77 IU/L (ref 0–32)
AST SERPL-CCNC: 55 IU/L (ref 0–40)
BILIRUB SERPL-MCNC: 0.4 MG/DL (ref 0–1.2)
BUN SERPL-MCNC: 14 MG/DL (ref 8–27)
BUN/CREAT SERPL: 24 (ref 12–28)
C PEPTIDE SERPL-MCNC: 1.2 NG/ML (ref 1.1–4.4)
CALCIUM SERPL-MCNC: 9.1 MG/DL (ref 8.7–10.3)
CHLORIDE SERPL-SCNC: 103 MMOL/L (ref 96–106)
CO2 SERPL-SCNC: 27 MMOL/L (ref 20–29)
CREAT SERPL-MCNC: 0.58 MG/DL (ref 0.57–1)
GAD65 AB SER IA-ACNC: <5 U/ML (ref 0–5)
GLOBULIN SER CALC-MCNC: 1.6 G/DL (ref 1.5–4.5)
GLUCOSE SERPL-MCNC: 77 MG/DL (ref 65–99)
INSULIN SERPL-ACNC: 3 UIU/ML (ref 2.6–24.9)
POTASSIUM SERPL-SCNC: 4.6 MMOL/L (ref 3.5–5.2)
PROINSULIN SERPL-SCNC: 1.8 PMOL/L (ref 0–10)
PROT SERPL-MCNC: 5.9 G/DL (ref 6–8.5)
SODIUM SERPL-SCNC: 140 MMOL/L (ref 134–144)

## 2021-09-01 ENCOUNTER — OFFICE VISIT (OUTPATIENT)
Dept: FAMILY MEDICINE CLINIC | Age: 66
End: 2021-09-01
Payer: COMMERCIAL

## 2021-09-01 VITALS
HEIGHT: 61 IN | HEART RATE: 59 BPM | TEMPERATURE: 98.3 F | DIASTOLIC BLOOD PRESSURE: 65 MMHG | SYSTOLIC BLOOD PRESSURE: 103 MMHG | OXYGEN SATURATION: 98 % | RESPIRATION RATE: 15 BRPM | BODY MASS INDEX: 24.41 KG/M2 | WEIGHT: 129.3 LBS

## 2021-09-01 DIAGNOSIS — R79.89 ELEVATED LFTS: Primary | ICD-10-CM

## 2021-09-01 DIAGNOSIS — I10 HTN (HYPERTENSION), MALIGNANT: ICD-10-CM

## 2021-09-01 PROCEDURE — 99214 OFFICE O/P EST MOD 30 MIN: CPT | Performed by: FAMILY MEDICINE

## 2021-09-01 NOTE — PROGRESS NOTES
Chief Complaint   Patient presents with    Diabetes     Shibboleth Lifestyle Diet    Labs     Fasting today     1. Have you been to the ER, urgent care clinic since your last visit? Hospitalized since your last visit? Yes Where: [facility]: Ear pain    2. Have you seen or consulted any other health care providers outside of the [address] [place] [address] since your last visit? Include any pap smears or colon screening. Lizeth Choe  9/1/2021  Provider:   Peri:  Diabetes Report Card   1) Have you seen the eye doctor in past year?no    2) How would you  rate your Diabetic Diet? Amazinng   3) How well do you take care of your feet? Self care   4) Do you keep your Primary Care Follow Up Appts? yes    5) Do you know your A1C goal?yes    6) Do you take your medications daily? yes    7) Do you check your blood sugars? yes    8) Have you gained weight?no       9) Do you follow an exercise program?yes    10) Can you do better?yes      Lab Results   Component Value Date/Time    Cholesterol, total 163 07/26/2021 10:00 AM    HDL Cholesterol 71 07/26/2021 10:00 AM    LDL, calculated 84.2 07/26/2021 10:00 AM    Triglyceride 39 07/26/2021 10:00 AM    CHOL/HDL Ratio 2.3 07/26/2021 10:00 AM     Lab Results   Component Value Date/Time    Hemoglobin A1c 5.0 07/26/2021 10:00 AM    Hemoglobin A1c 5.2 01/14/2021 12:00 PM    Hemoglobin A1c 5.4 10/23/2020 11:36 AM    Glucose 77 08/06/2021 12:00 AM    Glucose (POC) 83 12/05/2020 10:11 AM    LDL, calculated 84.2 07/26/2021 10:00 AM    Creatinine 0.58 08/06/2021 12:00 AM                 Chief Complaint   Patient presents with    Diabetes     Shibboleth Lifestyle Diet    Labs     Fasting today     She is a 61 y.o. female who presents for evalution. Reviewed PmHx, RxHx, FmHx, SocHx, AllgHx and updated and dated in the chart.     Patient Active Problem List    Diagnosis    Hypoglycemia    Iron deficiency anemia due to chronic blood loss    S/P TKR (total knee replacement), right    Arthritis of right knee    Confusion    Insomnia    Primary osteoarthritis of right knee    Anemia    S/P bariatric surgery    Hyperglycemia    Cervical stenosis of spine    Vitamin D deficiency    Hypothyroid    Osteoarthritis    Asthma    Depression    HTN (hypertension), malignant       Review of Systems - negative except as listed above in the HPI    Objective:     Vitals:    09/01/21 0839   BP: 103/65   Pulse: (!) 59   Resp: 15   Temp: 98.3 °F (36.8 °C)   TempSrc: Oral   SpO2: 98%   Weight: 129 lb 4.8 oz (58.7 kg)   Height: 5' 1\" (1.549 m)         Assessment/ Plan:   Diagnoses and all orders for this visit:    1. Elevated LFTs  -     METABOLIC PANEL, COMPREHENSIVE; Future  -     HEPATITIS PANEL, ACUTE; Future  -no source, no new rx, no ETOH  -consider ultrasound if not better    2. HTN (hypertension), malignant  -at goal             I have discussed the diagnosis with the patient and the intended plan as seen in the above orders. The patient understands and agrees with the plan. The patient has received an after-visit summary and questions were answered concerning future plans. Medication Side Effects and Warnings were discussed with patient  Patient Labs were reviewed and or requested:  Patient Past Records were reviewed and or requested    Yu Arredondo M.D. There are no Patient Instructions on file for this visit.

## 2021-09-02 LAB
ALBUMIN SERPL-MCNC: 3.6 G/DL (ref 3.5–5)
ALBUMIN/GLOB SERPL: 1.4 {RATIO} (ref 1.1–2.2)
ALP SERPL-CCNC: 44 U/L (ref 45–117)
ALT SERPL-CCNC: 60 U/L (ref 12–78)
ANION GAP SERPL CALC-SCNC: 3 MMOL/L (ref 5–15)
AST SERPL-CCNC: 38 U/L (ref 15–37)
BILIRUB SERPL-MCNC: 0.4 MG/DL (ref 0.2–1)
BUN SERPL-MCNC: 18 MG/DL (ref 6–20)
BUN/CREAT SERPL: 32 (ref 12–20)
CALCIUM SERPL-MCNC: 8.1 MG/DL (ref 8.5–10.1)
CHLORIDE SERPL-SCNC: 107 MMOL/L (ref 97–108)
CO2 SERPL-SCNC: 28 MMOL/L (ref 21–32)
CREAT SERPL-MCNC: 0.56 MG/DL (ref 0.55–1.02)
GLOBULIN SER CALC-MCNC: 2.5 G/DL (ref 2–4)
GLUCOSE SERPL-MCNC: 73 MG/DL (ref 65–100)
HAV IGM SER QL: NONREACTIVE
HBV CORE IGM SER QL: NONREACTIVE
HBV SURFACE AG SER QL: <0.1 INDEX
HBV SURFACE AG SER QL: NEGATIVE
HCV AB SERPL QL IA: NONREACTIVE
POTASSIUM SERPL-SCNC: 4.5 MMOL/L (ref 3.5–5.1)
PROT SERPL-MCNC: 6.1 G/DL (ref 6.4–8.2)
SODIUM SERPL-SCNC: 138 MMOL/L (ref 136–145)
SP1: NORMAL
SP2: NORMAL
SP3: NORMAL

## 2021-09-07 NOTE — PROGRESS NOTES
Please contact patient regarding their mychart resulted labs. They have not reviewed them to date.       Dr. Bart Benavides

## 2021-09-15 ENCOUNTER — OFFICE VISIT (OUTPATIENT)
Dept: ENDOCRINOLOGY | Age: 66
End: 2021-09-15
Payer: COMMERCIAL

## 2021-09-15 VITALS
HEIGHT: 61 IN | RESPIRATION RATE: 20 BRPM | OXYGEN SATURATION: 97 % | WEIGHT: 127 LBS | BODY MASS INDEX: 23.98 KG/M2 | SYSTOLIC BLOOD PRESSURE: 116 MMHG | DIASTOLIC BLOOD PRESSURE: 67 MMHG | HEART RATE: 74 BPM | TEMPERATURE: 97.9 F

## 2021-09-15 DIAGNOSIS — Z98.84 S/P GASTRIC BYPASS: ICD-10-CM

## 2021-09-15 DIAGNOSIS — E16.2 HYPOGLYCEMIA: Primary | ICD-10-CM

## 2021-09-15 PROCEDURE — 99214 OFFICE O/P EST MOD 30 MIN: CPT | Performed by: INTERNAL MEDICINE

## 2021-09-15 RX ORDER — DASIGLUCAGON 0.6 MG/.6ML
0.6 INJECTION, SOLUTION SUBCUTANEOUS AS NEEDED
Qty: 1 EACH | Refills: 1 | Status: SHIPPED | OUTPATIENT
Start: 2021-09-15 | End: 2022-06-24

## 2021-09-15 NOTE — LETTER
9/15/2021    Patient: Jaye Akins   YOB: 1955   Date of Visit: 9/15/2021     Sherryle Petri, [address] [facility] [address]  [place]    Dear Sherryle Petri, MD,      Thank you for referring Ms. Ivan La to [id number] [place] [address] for evaluation. My notes for this consultation are attached. If you have questions, please do not hesitate to call me. I look forward to following your patient along with you.       Sincerely,    Lyla Rand MD

## 2021-09-15 NOTE — PROGRESS NOTES
HISTORY OF PRESENT ILLNESS  Pancho Roberts is a 61 y.o. female. First follow up visit after  Initial visit for  Hypoglycemias     Lost 2 lbs ( she was afraid to use trulicity )   Using Velez 2   Unable to find  Test strips  -   She  Is very anxious  About  Going low and being alone to attend to her  ( as her   Has stroke )          August 2021   She is referred by pcp  Supposed to see Dr. Heladio Talavera   As patient required immediate help, she is changed to my schedule   s/p bariatric surgery  In  July 2013 @ [facility]   By Dr. Mary Manzanares   5 years after surgery  - she started  Noticing hypoglycemias   The hypoglycemias as happening more often and are going to as low as 45 mg   She is afraid that her  cannot be of help because of the stroke he had and is non verbal   She is on Velez 2 - she notices the discrepancy and hopes to get dexcom             Review of Systems   None       Physical Exam   Constitutional: She is oriented to person, place, and time. She appears well-developed and well-nourished. Psychiatric: She has a normal mood and affect. ASSESSMENT and PLAN      1. Hypoglycemia - s/p gastric bypass - reactive    She reports that low sugars are happening at same rate , but they do not seem lasting longer like they used to   She counts carbs diligently and is trying hard to eliminate lows from happening   She mentioned she will try using trulicity  Moving forward     She is not cross checking sugars with a meter as she is symptomatic   ONE week of  Logging  is requested     She is in constant worry about \" dropping low \" and passing out . Her  had a major stroke and is non verbal   She likes being on Burdette 2 system.  Hoping the CGM will get covered on her plan     Reviewed zeke 2 - she has many sugars below 60 mg           August 2021   She is on  \" purple diet \"  Shibboleth  , 20 gm of carbs ; 80 to 90 gm protein    Reviewed the zeke readings   likely reactive , but she is on good diet form discussion. I reviewed above diet and asked her to exclude a few fruits and peanut butter   She will maintain a journal   To check at pharmacy - if she got basic or zeke 2 sensors   - discussed over reading of lows   dexcom can be considered for accuracy   -  Suggested use of meds - acarbose, metformin and trulicity    Each one be tried at a time and we gave trulicity the first chance   - do fasting  Labs       2. S/p gastric bypass surgery - she has severe   Hypoglycemias     3.   Educated on hypoglycemia management, Sent [employer]ag[employer]      Reviewed results with patient and discussed the labs being ordered today/bnv  Patient voiced understanding of plan of care

## 2021-09-15 NOTE — PATIENT INSTRUCTIONS
SPECIFIC INSTRUCTIONS BELOW       Stay on trulicity 7.69 mg once a week        -------------PAY ATTENTION TO THESE GENERAL INSTRUCTIONS -----------------    -ANY tests other than blood work, which you opt to do  outside the  [facility] imaging facilities, you are responsible for prior authorizations if  required    - [address] UP TO DATE ON YOUR AVS- PLEASE IGNORE   - YOUR MED LIST IS NOT UP TO DATE AS SOME CHANGES ARE BEING MADE AFTER THE VISIT - [address]     Results     *Normal results will not be notified by a phone call starting January 1 2021   *If you have an upcoming visit, the results will be discussed at the visit   *Please sign up for MY CHART if you want access to your lab and test results  *Abnormal results which require immediate attention will be notified by phone call   *Abnormal results which do not require immediate assistance will be notified in 1-2 weeks       Refills    -    have your pharmacy send us a refill request . Refills are done max for one year and a visit is a must before refills are extended    Follow up appointments -  highly encourage you to make it when you are checking out. We can accommodate you into the schedule based on your clinical situation, but not for extending refills beyond a year. Labs are important to give refills and is important to get labs before the visit     Phone calls  -  Allow  24 hrs.  for non-urgent calls to be returned  Prior authorization - It may take 2-4 weeks to process  Forms  -  FMLA, DMV etc., will take up to 2 weeks to process  Cancellations - please notify the office 2 days in advance   Samples  - will only be dispensed at visits       If not showing for the appointments and cancelling appointments within 24 hours are kept track of and three  of such situations in  two consecutive years will likely be considered for termination from the practice    -------------------------------------------------------------------------------------------------------------------

## 2021-09-27 ENCOUNTER — TELEPHONE (OUTPATIENT)
Dept: ENDOCRINOLOGY | Age: 66
End: 2021-09-27

## 2021-09-27 NOTE — TELEPHONE ENCOUNTER
----- Message from Leeanna Justin sent at 9/27/2021  9:07 AM EDT -----  Regarding: /telephone  General Message/Vendor Calls    Caller's first and last name: self      Reason for call: PA       Callback required yes/no and why: yes      Best contact number(s):[phone]      Details to clarify the request:  Cookie Mtz called to get PA on Zegalogue for pt.       Leeanna Justin

## 2021-09-29 ENCOUNTER — TELEPHONE (OUTPATIENT)
Dept: ENDOCRINOLOGY | Age: 66
End: 2021-09-29

## 2021-09-29 NOTE — TELEPHONE ENCOUNTER
----- Message from Mayte Asencio sent at 9/29/2021  9:48 AM EDT -----  Regarding: /telephone  General Message/Vendor Calls    Caller's first and last name: [facility]       Reason for call: call back       Callback required yes/no and why: yes      Best contact number(s):[phone]      Details to clarify the request: called in for PA for dasiglucagon (Zegalogue Autoinjector) 0.6 mg/0.6 mL atIn and would like a call back.       Mayte Asencio

## 2021-10-26 ENCOUNTER — TELEPHONE (OUTPATIENT)
Dept: FAMILY MEDICINE CLINIC | Age: 66
End: 2021-10-26

## 2021-10-26 DIAGNOSIS — F41.9 ANXIETY: ICD-10-CM

## 2021-10-26 RX ORDER — DICLOFENAC SODIUM 10 MG/G
2 GEL TOPICAL EVERY 6 HOURS
Qty: 30 G | Refills: 5 | Status: SHIPPED | OUTPATIENT
Start: 2021-10-26 | End: 2021-11-18

## 2021-10-26 RX ORDER — BUSPIRONE HYDROCHLORIDE 7.5 MG/1
7.5 TABLET ORAL
Qty: 180 TABLET | Refills: 3 | Status: SHIPPED | OUTPATIENT
Start: 2021-10-26 | End: 2022-05-18 | Stop reason: SDUPTHER

## 2021-10-26 NOTE — TELEPHONE ENCOUNTER
Patient requesting medication for pain for a broken collar bone and 3 ribs. She would like Flexeril as well.

## 2021-10-26 NOTE — TELEPHONE ENCOUNTER
Patient fell and has a broken collar bone and 3 broken ribs( xray done at [facility]) Dr. Clara Alexis ordered her oxycodone and flexeril. She can not take nsaids due to hx of gastric bypass but she would like to have a refill on voltarin gel. That seems to work the best.   Refill request sent.

## 2021-10-26 NOTE — TELEPHONE ENCOUNTER
Patient would like to discuss the pain she is having with a broken collar bone and 3 ribs. She has already requested medication for this and has a VV 10.20.21.  Patient's phone: [phone]

## 2021-10-27 ENCOUNTER — TELEPHONE (OUTPATIENT)
Dept: FAMILY MEDICINE CLINIC | Age: 66
End: 2021-10-27

## 2021-11-02 ENCOUNTER — OFFICE VISIT (OUTPATIENT)
Dept: ENDOCRINOLOGY | Age: 66
End: 2021-11-02
Payer: COMMERCIAL

## 2021-11-02 ENCOUNTER — TELEPHONE (OUTPATIENT)
Dept: ENDOCRINOLOGY | Age: 66
End: 2021-11-02

## 2021-11-02 VITALS
BODY MASS INDEX: 23.26 KG/M2 | OXYGEN SATURATION: 98 % | HEART RATE: 84 BPM | RESPIRATION RATE: 16 BRPM | TEMPERATURE: 98 F | SYSTOLIC BLOOD PRESSURE: 104 MMHG | WEIGHT: 123.2 LBS | DIASTOLIC BLOOD PRESSURE: 70 MMHG | HEIGHT: 61 IN

## 2021-11-02 DIAGNOSIS — E16.2 HYPOGLYCEMIA: Primary | ICD-10-CM

## 2021-11-02 LAB — HBA1C MFR BLD HPLC: 4.9 %

## 2021-11-02 PROCEDURE — 99214 OFFICE O/P EST MOD 30 MIN: CPT | Performed by: INTERNAL MEDICINE

## 2021-11-02 PROCEDURE — 95251 CONT GLUC MNTR ANALYSIS I&R: CPT | Performed by: INTERNAL MEDICINE

## 2021-11-02 PROCEDURE — 83036 HEMOGLOBIN GLYCOSYLATED A1C: CPT | Performed by: INTERNAL MEDICINE

## 2021-11-02 NOTE — PATIENT INSTRUCTIONS
SPECIFIC INSTRUCTIONS BELOW     Stay on trulicity 4.86 mg once a week          -------------PAY ATTENTION TO THESE GENERAL INSTRUCTIONS -----------------      - The medications prescribed at this visit will not be available at pharmacy until 6 pm       - YOUR MED LIST IS NOT UP TO DATE AS SOME CHANGES ARE BEING MADE AFTER THE VISIT - FOLLOW SPECIFIC INSTRUCTIONS  ABOVE     -ANY tests other than blood work, which you opt to do  outside the  [facility] facilities, you are responsible for prior authorizations if  required    - [address] [facility]- PLEASE IGNORE     Results     *Normal results will not be notified by a phone call starting January 1 2021   *If you have an upcoming visit, the results will be discussed at the visit   *Please sign up for MY CHART if you want access to your lab and test results  *Abnormal results which require immediate attention will be notified by phone call   *Abnormal results which do not require immediate assistance will be notified in 1-2 weeks       Refills    -    have your pharmacy send us a refill request . Refills are done max for one year and a visit is a must before refills are extended    Follow up appointments -  highly encourage you to make it when you are checking out. We can accommodate you into the schedule based on your clinical situation, but not for extending refills beyond a year. Labs are important to give refills and is important to get labs before the visit     Phone calls  -  Allow  24 hrs.  for non-urgent calls to be returned  Prior authorization - It may take 2-4 weeks to process  Forms  -  FMLA, DMV etc., will take up to 2 weeks to process  Cancellations - please notify the office 2 days in advance   Samples  - will only be dispensed at visits       If not showing for the appointments and cancelling appointments within 24 hours are kept track of and three  of such situations in  two consecutive years will likely be considered for termination from the practice    -------------------------------------------------------------------------------------------------------------------

## 2021-11-02 NOTE — LETTER
11/2/2021    Patient: Jase Pereira   YOB: 1955   Date of Visit: 11/2/2021     [facility], [address] [address]  [place]    Dear Clyde Shi MD,      Thank you for referring Ms. Diane Kaufman to [address] [place] [address] for evaluation. My notes for this consultation are attached. If you have questions, please do not hesitate to call me. I look forward to following your patient along with you.       Sincerely,    Moe Gottlieb MD

## 2021-11-02 NOTE — PROGRESS NOTES
Identified pt with two pt identifiers(name and ). Reviewed record in preparation for visit and have obtained necessary documentation. Chief Complaint   Patient presents with    Low Blood Sugar        Health Maintenance Due   Topic    Colorectal Cancer Screening Combo     Shingrix Vaccine Age 50> (1 of 2)    Flu Vaccine (1)        Visit Vitals  /70 (BP 1 Location: Right arm, BP Patient Position: Sitting, BP Cuff Size: Adult)   Pulse 84   Temp 98 °F (36.7 °C) (Temporal)   Resp 16   Ht 5' 1\" (1.549 m)   Wt 123 lb 3.2 oz (55.9 kg)   SpO2 98%   BMI 23.28 kg/m²     Pain Scale: 8/10    Coordination of Care Questionnaire:  :   1. Have you been to the ER, urgent care clinic since your last visit? Hospitalized since your last visit? [facility] for broken ribs and collar bone 2 weeks     2. Have you seen or consulted any other health care providers outside of the [address] [place] [address] since your last visit? Include any pap smears or colon screening.  yes

## 2021-11-02 NOTE — TELEPHONE ENCOUNTER
Patient would like a call prior to her afternoon apt as to how to print out zeke 2 graph to bring to apt.

## 2021-11-02 NOTE — PROGRESS NOTES
HISTORY OF PRESENT ILLNESS  Sourav Mann is a 61 y.o. female. First follow up visit after  Initial visit for  Hypoglycemias after sept 2021     She had no severe low sugars  She has not passed out she says     Lost 4 lbs         Sept 2021     Lost 2 lbs ( she was afraid to use trulicity )   Using Velez 2   Unable to find  Test strips  -   She  Is very anxious  About  Going low and being alone to attend to her  ( as her   Has stroke )          August 2021   She is referred by pcp  Supposed to see Dr. Mayuri Verdugo   As patient required immediate help, she is changed to my schedule   s/p bariatric surgery  In  July 2013 @ [facility]   By Dr. Raulito Trujillo   5 years after surgery  - she started  Noticing hypoglycemias   The hypoglycemias as happening more often and are going to as low as 45 mg   She is afraid that her  cannot be of help because of the stroke he had and is non verbal   She is on Velez 2 - she notices the discrepancy and hopes to get dexcom             Review of Systems   None       Physical Exam   Constitutional: She is oriented to person, place, and time. She appears well-developed and well-nourished. Psychiatric: She has a normal mood and affect. ASSESSMENT and PLAN      1. Hypoglycemia - s/p gastric bypass - reactive    She is in constant worry about \" dropping low \" and passing out .  Her  had a major stroke and is non verbal     November 2021   Much lesser hypoglycemias   A1c by POC today is 4.9 %  =-  0.4 %  = 5.3 %      She has figured out to eat lot of protein food choices and learnt the balancing it   Reviewed   Libreview AGP  report for 14 days and discussed with pt    - 14 day average glucose 86  -0 % for high and    8   % low sugars and % in Target  Range  92 %    - percent of utiization 81 %  - CV% 17 %      August 2021   She is on  \" purple diet \"  Shibboleth  , 20 gm of carbs ; 80 to 90 gm protein    Reviewed the zeke readings   likely reactive , but she is on good diet form discussion. I reviewed above diet and asked her to exclude a few fruits and peanut butter   She will maintain a journal   To check at pharmacy - if she got basic or zeke 2 sensors   - discussed over reading of lows   dexcom can be considered for accuracy   -  Suggested use of meds - acarbose, metformin and trulicity    Each one be tried at a time and we gave trulicity the first chance   - do fasting  Labs       2. S/p gastric bypass surgery - she has severe   Hypoglycemias     3.   Educated on hypoglycemia management, Sent zeagologue      Reviewed results with patient and discussed the labs being ordered today/bnv  Patient voiced understanding of plan of care

## 2021-11-04 ENCOUNTER — VIRTUAL VISIT (OUTPATIENT)
Dept: FAMILY MEDICINE CLINIC | Age: 66
End: 2021-11-04
Payer: COMMERCIAL

## 2021-11-04 DIAGNOSIS — I10 HTN (HYPERTENSION), MALIGNANT: Primary | ICD-10-CM

## 2021-11-04 DIAGNOSIS — S22.42XS CLOSED FRACTURE OF MULTIPLE RIBS OF LEFT SIDE, SEQUELA: ICD-10-CM

## 2021-11-04 DIAGNOSIS — F32.A DEPRESSION, UNSPECIFIED DEPRESSION TYPE: ICD-10-CM

## 2021-11-04 DIAGNOSIS — S42.002S CLOSED NONDISPLACED FRACTURE OF LEFT CLAVICLE, UNSPECIFIED PART OF CLAVICLE, SEQUELA: ICD-10-CM

## 2021-11-04 PROCEDURE — 99213 OFFICE O/P EST LOW 20 MIN: CPT | Performed by: FAMILY MEDICINE

## 2021-11-04 RX ORDER — TRAZODONE HYDROCHLORIDE 50 MG/1
100 TABLET ORAL
Qty: 90 TABLET | Refills: 1 | Status: SHIPPED | OUTPATIENT
Start: 2021-11-04 | End: 2021-12-01

## 2021-11-04 RX ORDER — CYCLOBENZAPRINE HCL 10 MG
10 TABLET ORAL
Qty: 45 TABLET | Refills: 0 | Status: SHIPPED | OUTPATIENT
Start: 2021-11-04 | End: 2021-11-30

## 2021-11-04 RX ORDER — ESCITALOPRAM OXALATE 20 MG/1
TABLET ORAL
Qty: 90 TABLET | Refills: 1 | Status: SHIPPED | OUTPATIENT
Start: 2021-11-04 | End: 2021-12-01 | Stop reason: SDUPTHER

## 2021-11-04 NOTE — PROGRESS NOTES
Consent: Fer Ness, who was seen by synchronous (real-time) audio-video technology, and/or her healthcare decision maker, is aware that this patient-initiated, Telehealth encounter on 11/4/2021 is a billable service, with coverage as determined by her insurance carrier. She is aware that she may receive a bill and has provided verbal consent to proceed: YES-Consent obtained within past 12 months  712    Prior to Admission medications    Medication Sig Start Date End Date Taking? Authorizing Provider   cyclobenzaprine (FLEXERIL) 10 mg tablet Take 1 Tablet by mouth three (3) times daily as needed for Muscle Spasm(s). 11/4/21  Yes Ky George MD   busPIRone (BUSPAR) 7.5 mg tablet Take 1 Tablet by mouth two (2) times daily as needed (anxiety). 10/26/21   Ky George MD   diclofenac (VOLTAREN) 1 % gel Apply 2 g to affected area every six (6) hours for 23 days. 10/26/21 11/18/21  Ky George MD   glucagon 0.5 mg/0.1 mL syrg 0.5 mg by SubCUTAneous route as needed (Hypoglycemia). 9/27/21   Hayley Lincoln MD   dasiglucagon (Zegalogue Autoinjector) 0.6 mg/0.6 mL atIn 0.6 mg by SubCUTAneous route as needed (Hypoglycemia). 9/15/21   Hayley Lincoln MD   dulaglutide (Trulicity) 6.72 KW/5.4 mL sub-q pen 0.5 mL by SubCUTAneous route every seven (7) days. 8/6/21   Hayley Lincoln MD   flash glucose sensor (FreeStyle Danna 2 Sensor) kit One sensor every 14 days 8/5/21   Hayley Lincoln MD   gabapentin (NEURONTIN) 300 mg capsule TAKE 1 CAPSULE BY MOUTH THREE TIMES DAILY AS NEEDED FOR PAIN 7/26/21   Ky George MD   traZODone (DESYREL) 50 mg tablet Take 2 Tablets by mouth nightly.  7/9/21   Ky George MD   escitalopram oxalate (LEXAPRO) 20 mg tablet TAKE 1 TABLET BY MOUTH ONCE DAILY 7/9/21   Ky George MD   flash glucose scanning reader [facility] 14 Day [place]) misc Check sugars 4/14/21   Ky George MD   acetaminophen (Acetaminophen Pain Relief) 500 mg tablet Take 2 Tabs by mouth every six (6) hours as needed for Pain. 12/3/20   Marysol Arnett MD     Allergies   Allergen Reactions    Nsaids (Non-Steroidal Anti-Inflammatory Drug) Other (comments)     Gastric bypass           Assessment & Plan:   Diagnoses and all orders for this visit:    1. HTN (hypertension), malignant  At goal  2. Closed fracture of multiple ribs of left side, sequela  -     cyclobenzaprine (FLEXERIL) 10 mg tablet; Take 1 Tablet by mouth three (3) times daily as needed for Muscle Spasm(s). Patient suffered a fall 2 weeks ago resulting in multiple rib fractures and left collarbone fracture. Patient would like some medicine for pain and states the oxycodone that was given her makes her too sleepy but the Flexeril does work better for her. Add medication as above and call if any increasing shortness of breath. 3. Closed nondisplaced fracture of left clavicle, unspecified part of clavicle, sequela  -     cyclobenzaprine (FLEXERIL) 10 mg tablet; Take 1 Tablet by mouth three (3) times daily as needed for Muscle Spasm(s). Medication Side Effects and Warnings were discussed with patient  Patient Labs were reviewed and or requested:  Patient Past Records were reviewed and or requested              We discussed the expected course, resolution and complications of the diagnosis(es) in detail. Medication risks, benefits, costs, interactions, and alternatives were discussed as indicated. I advised her to contact the office if her condition worsens, changes or fails to improve as anticipated. She expressed understanding with the diagnosis(es) and plan. Alyssia Hoover is a 61 y.o. female being evaluated by a video visit encounter for concerns as above. A caregiver was present when appropriate. Due to this being a TeleHealth encounter (During GEO-05 public health emergency), evaluation of the following organ systems was limited: Vitals/Constitutional/EENT/Resp/CV/GI//MS/Neuro/Skin/Heme-Lymph-Imm.   Pursuant to the emergency declaration under the [address] [facility], [address] [place] [address] authority and the [employer] Act, this Virtual  Visit was conducted, with patient's (and/or legal guardian's) consent, to reduce the patient's risk of exposure to COVID-19 and provide necessary medical care. Services were provided through a video synchronous discussion virtually to substitute for in-person clinic visit. Patient and provider were located at their individual homes. I have discussed the diagnosis with the patient and the intended plan as seen in the above orders. The patient understands and agrees with the plan. The patient has received an after-visit summary and questions were answered concerning future plans. Medication Side Effects and Warnings were discussed with patient  Patient Labs were reviewed and or requested:  Patient Past Records were reviewed and or requested    Denis Lopez M.D. There are no Patient Instructions on file for this visit.

## 2021-11-30 DIAGNOSIS — S42.002S CLOSED NONDISPLACED FRACTURE OF LEFT CLAVICLE, UNSPECIFIED PART OF CLAVICLE, SEQUELA: ICD-10-CM

## 2021-11-30 DIAGNOSIS — S22.42XS CLOSED FRACTURE OF MULTIPLE RIBS OF LEFT SIDE, SEQUELA: ICD-10-CM

## 2021-11-30 RX ORDER — CYCLOBENZAPRINE HCL 10 MG
TABLET ORAL
Qty: 45 TABLET | Refills: 0 | Status: SHIPPED | OUTPATIENT
Start: 2021-11-30 | End: 2022-06-24

## 2021-12-01 ENCOUNTER — TELEPHONE (OUTPATIENT)
Dept: FAMILY MEDICINE CLINIC | Age: 66
End: 2021-12-01

## 2021-12-01 DIAGNOSIS — F32.A DEPRESSION, UNSPECIFIED DEPRESSION TYPE: ICD-10-CM

## 2021-12-01 RX ORDER — TRAZODONE HYDROCHLORIDE 100 MG/1
100 TABLET ORAL
Qty: 90 TABLET | Refills: 3 | Status: SHIPPED | OUTPATIENT
Start: 2021-12-01 | End: 2022-07-29 | Stop reason: SDUPTHER

## 2021-12-01 RX ORDER — ESCITALOPRAM OXALATE 20 MG/1
TABLET ORAL
Qty: 90 TABLET | Refills: 1 | Status: SHIPPED | OUTPATIENT
Start: 2021-12-01 | End: 2022-02-10

## 2021-12-01 NOTE — TELEPHONE ENCOUNTER
----- Message from Kacy Padilla sent at 11/30/2021  4:54 PM EST -----  Subject: Refill Request    QUESTIONS  Name of Medication? flash glucose sensor (FreeStyle Danna 2 Sensor) kit  Patient-reported dosage and instructions? One sensor every 14 days  How many days do you have left? 3  Preferred Pharmacy? [address] [facility] [employer] phone number (if available)? [phone]  Additional Information for Provider? Quantity? 2 Kit  ---------------------------------------------------------------------------  --------------  CALL BACK INFO  What is the best way for the office to contact you? OK to leave message on   voicemail  Preferred Call Back Phone Number?  [phone]

## 2021-12-13 ENCOUNTER — OFFICE VISIT (OUTPATIENT)
Dept: FAMILY MEDICINE CLINIC | Age: 66
End: 2021-12-13
Payer: COMMERCIAL

## 2021-12-13 VITALS
DIASTOLIC BLOOD PRESSURE: 74 MMHG | HEART RATE: 100 BPM | SYSTOLIC BLOOD PRESSURE: 115 MMHG | TEMPERATURE: 98.1 F | HEIGHT: 61 IN | RESPIRATION RATE: 20 BRPM | BODY MASS INDEX: 23.6 KG/M2 | WEIGHT: 125 LBS | OXYGEN SATURATION: 97 %

## 2021-12-13 DIAGNOSIS — E16.2 HYPOGLYCEMIA: Primary | ICD-10-CM

## 2021-12-13 DIAGNOSIS — G89.29 CHRONIC PAIN OF RIGHT KNEE: ICD-10-CM

## 2021-12-13 DIAGNOSIS — M25.561 CHRONIC PAIN OF RIGHT KNEE: ICD-10-CM

## 2021-12-13 DIAGNOSIS — D50.9 IRON DEFICIENCY ANEMIA, UNSPECIFIED IRON DEFICIENCY ANEMIA TYPE: ICD-10-CM

## 2021-12-13 LAB
ALBUMIN SERPL-MCNC: 3.8 G/DL (ref 3.5–5)
ALBUMIN/GLOB SERPL: 1.6 {RATIO} (ref 1.1–2.2)
ALP SERPL-CCNC: 77 U/L (ref 45–117)
ALT SERPL-CCNC: 98 U/L (ref 12–78)
ANION GAP SERPL CALC-SCNC: 4 MMOL/L (ref 5–15)
AST SERPL-CCNC: 76 U/L (ref 15–37)
BASOPHILS # BLD: 0.1 K/UL (ref 0–0.1)
BASOPHILS NFR BLD: 2 % (ref 0–1)
BILIRUB SERPL-MCNC: 0.3 MG/DL (ref 0.2–1)
BUN SERPL-MCNC: 31 MG/DL (ref 6–20)
BUN/CREAT SERPL: 56 (ref 12–20)
CALCIUM SERPL-MCNC: 8.7 MG/DL (ref 8.5–10.1)
CHLORIDE SERPL-SCNC: 112 MMOL/L (ref 97–108)
CO2 SERPL-SCNC: 27 MMOL/L (ref 21–32)
CREAT SERPL-MCNC: 0.55 MG/DL (ref 0.55–1.02)
CRP SERPL HS-MCNC: 0.3 MG/L
DIFFERENTIAL METHOD BLD: ABNORMAL
EOSINOPHIL # BLD: 0 K/UL (ref 0–0.4)
EOSINOPHIL NFR BLD: 1 % (ref 0–7)
ERYTHROCYTE [DISTWIDTH] IN BLOOD BY AUTOMATED COUNT: 15.3 % (ref 11.5–14.5)
GLOBULIN SER CALC-MCNC: 2.4 G/DL (ref 2–4)
GLUCOSE SERPL-MCNC: 76 MG/DL (ref 65–100)
HCT VFR BLD AUTO: 38.2 % (ref 35–47)
HGB BLD-MCNC: 12.1 G/DL (ref 11.5–16)
IMM GRANULOCYTES # BLD AUTO: 0 K/UL (ref 0–0.04)
IMM GRANULOCYTES NFR BLD AUTO: 0 % (ref 0–0.5)
IRON SATN MFR SERPL: 28 % (ref 20–50)
IRON SERPL-MCNC: 78 UG/DL (ref 35–150)
LYMPHOCYTES # BLD: 0.9 K/UL (ref 0.8–3.5)
LYMPHOCYTES NFR BLD: 18 % (ref 12–49)
MCH RBC QN AUTO: 31 PG (ref 26–34)
MCHC RBC AUTO-ENTMCNC: 31.7 G/DL (ref 30–36.5)
MCV RBC AUTO: 97.9 FL (ref 80–99)
MONOCYTES # BLD: 0.3 K/UL (ref 0–1)
MONOCYTES NFR BLD: 7 % (ref 5–13)
NEUTS SEG # BLD: 3.6 K/UL (ref 1.8–8)
NEUTS SEG NFR BLD: 72 % (ref 32–75)
NRBC # BLD: 0 K/UL (ref 0–0.01)
NRBC BLD-RTO: 0 PER 100 WBC
PLATELET # BLD AUTO: 229 K/UL (ref 150–400)
PMV BLD AUTO: 12.9 FL (ref 8.9–12.9)
POTASSIUM SERPL-SCNC: 4.7 MMOL/L (ref 3.5–5.1)
PROT SERPL-MCNC: 6.2 G/DL (ref 6.4–8.2)
RBC # BLD AUTO: 3.9 M/UL (ref 3.8–5.2)
SODIUM SERPL-SCNC: 143 MMOL/L (ref 136–145)
TIBC SERPL-MCNC: 274 UG/DL (ref 250–450)
WBC # BLD AUTO: 5 K/UL (ref 3.6–11)

## 2021-12-13 PROCEDURE — 99213 OFFICE O/P EST LOW 20 MIN: CPT | Performed by: FAMILY MEDICINE

## 2021-12-13 NOTE — PROGRESS NOTES
Patient here for 3 mo. Dm. Patient states her blood sugars are running in the 50's. She feels tired and has no energy. Her bs this morning was 49. Patient also here for C-reactive protein labs for Dr. Adal Kirkland. 1. Have you been to the ER, urgent care clinic since your last visit? Hospitalized since your last visit? No    2. Have you seen or consulted any other health care providers outside of the [address] [place] [address] since your last visit? Include any pap smears or colon screening. No       Mary Medina  12/13/2021  Provider:   2   1) Have you seen the eye doctor in past year?yes    2) How would you  rate your Diabetic Diet?good   3) How well do you take care of your feet?well   4) Do you keep your Primary Care Follow Up Appts? yes    5) Do you know your A1C goal?yes    6) Do you take your medications daily? yes    7) Do you check your blood sugars? yes    8) Have you gained weight?no       9) Do you follow an exercise program?yes    10) Can you do better?yes      Lab Results   Component Value Date/Time    Cholesterol, total 163 07/26/2021 10:00 AM    HDL Cholesterol 71 07/26/2021 10:00 AM    LDL, calculated 84.2 07/26/2021 10:00 AM    Triglyceride 39 07/26/2021 10:00 AM    CHOL/HDL Ratio 2.3 07/26/2021 10:00 AM     Lab Results   Component Value Date/Time    Hemoglobin A1c 5.0 07/26/2021 10:00 AM    Hemoglobin A1c 5.2 01/14/2021 12:00 PM    Hemoglobin A1c 5.4 10/23/2020 11:36 AM    Glucose 73 09/01/2021 09:44 AM    Glucose (POC) 83 12/05/2020 10:11 AM    LDL, calculated 84.2 07/26/2021 10:00 AM    Creatinine 0.56 09/01/2021 09:44 AM             Chief Complaint   Patient presents with    Labs     C-reactive protein   Dr. Shawna Witt Blood Sugar     blood sugars running in 50's     She is a 61 y.o. female who presents for evalution. Reviewed PmHx, RxHx, FmHx, SocHx, AllgHx and updated and dated in the chart.     Patient Active Problem List    Diagnosis    Hypoglycemia    Iron deficiency anemia due to chronic blood loss    S/P TKR (total knee replacement), right    Arthritis of right knee    Confusion    Insomnia    Primary osteoarthritis of right knee    Anemia    S/P bariatric surgery    Hyperglycemia    Cervical stenosis of spine    Vitamin D deficiency    Hypothyroid    Osteoarthritis    Asthma    Depression    HTN (hypertension), malignant       Review of Systems - negative except as listed above in the HPI    Objective:     Vitals:    12/13/21 1054   BP: 115/74   Pulse: 100   Resp: 20   Temp: 98.1 °F (36.7 °C)   SpO2: 97%   Weight: 125 lb (56.7 kg)   Height: 5' 1\" (1.549 m)         Assessment/ Plan:   Diagnoses and all orders for this visit:    1. Hypoglycemia  -hold Trulicity since sugars lower since start    2. Chronic pain of right knee  -     CRP, HIGH SENSITIVITY; Future  -labs to Dr. Marissa Alvarado               I have discussed the diagnosis with the patient and the intended plan as seen in the above orders. The patient understands and agrees with the plan. The patient has received an after-visit summary and questions were answered concerning future plans. Medication Side Effects and Warnings were discussed with patient  Patient Labs were reviewed and or requested:  Patient Past Records were reviewed and or requested    Pat Morrison M.D. There are no Patient Instructions on file for this visit.

## 2021-12-15 ENCOUNTER — TELEPHONE (OUTPATIENT)
Dept: FAMILY MEDICINE CLINIC | Age: 66
End: 2021-12-15

## 2021-12-15 NOTE — TELEPHONE ENCOUNTER
Called and LVM for Patient. Dimitris Yap) Requested call back to office to review lab results aready seen by patient on Mychart.

## 2021-12-16 RX ORDER — AMOXICILLIN 500 MG/1
2000 CAPSULE ORAL ONCE
Qty: 4 CAPSULE | Refills: 5 | Status: SHIPPED | OUTPATIENT
Start: 2021-12-16 | End: 2021-12-16

## 2021-12-16 NOTE — TELEPHONE ENCOUNTER
Antibiotic called in.   Patient needs to make appointment with orthopedic doctor of her choice and to call us back with date and time for referral.

## 2021-12-16 NOTE — TELEPHONE ENCOUNTER
Called and spoke to patient. Rashaad Berry Patient states understanding of lab results per dr Chriss Sánchez: Labs stable without changes. Patient complaining of continued Right knee pain and swelling. Right TKR Done:12/3/20 Dr Torrie James. Requesting referral to Thelma Loo at [facility] to obtain a  second opinion on continued symptoms and lack of Range of Motion. Also. .patient requesting Dental Prophylaxis antibiotic for up coming dental Surgery Power.3.

## 2021-12-18 LAB
CREATININE, EXTERNAL: 0.57
HBA1C MFR BLD HPLC: 5.1 %

## 2021-12-20 NOTE — PROGRESS NOTES
Please contact patient regarding their mychart resulted labs. They have not reviewed them to date.       Dr. Valdo Wood

## 2022-01-28 ENCOUNTER — TELEPHONE (OUTPATIENT)
Dept: FAMILY MEDICINE CLINIC | Age: 67
End: 2022-01-28

## 2022-01-28 NOTE — TELEPHONE ENCOUNTER
Patient has a sinus infection but doesn't have a VV until Thursday 02.04.22. In the meantime, she would like to speak with nurse as to what she can take over the counter.  Patient's phone: [phone]

## 2022-01-31 NOTE — TELEPHONE ENCOUNTER
Lvm to patient regarding her sx and encouraged her to MyChart message dr Keily Cervantes for advise, for she will probably get a faster response.

## 2022-02-01 ENCOUNTER — TELEPHONE (OUTPATIENT)
Dept: FAMILY MEDICINE CLINIC | Age: 67
End: 2022-02-01

## 2022-02-01 RX ORDER — AZITHROMYCIN 250 MG/1
TABLET, FILM COATED ORAL
Qty: 6 TABLET | Refills: 0 | Status: SHIPPED | OUTPATIENT
Start: 2022-02-01 | End: 2022-06-24

## 2022-02-01 NOTE — TELEPHONE ENCOUNTER
Patient's  had covid 1/7/22. She had similar, if not worse sx a day or two after. She did not get tested, just assumes she was positive. Her husbands symptoms have cleared up. But she continues with sever nasal drainage and sinus pressure. She has been on zyrtec and benadryl to help dry her up. Not working. She has a hx of gastric bypass , so she does not usually take tylenol or ibuprofen, but she tried Excedrin for the headache anyway. That did not help. She is thinking it has turned into a sinus infection from all the congestion she had at the beginning of the month. VV with Dr. Jc Shankar 2/4/2022. Is there anything that can be sent for sinus drainage / headache now before her appt. Natalie Dale.

## 2022-02-01 NOTE — TELEPHONE ENCOUNTER
Returned call to patient. Informed as per Dr. Reg Grant to take 2 zyrtec. She states she is already taking zyrtec twice per day.

## 2022-02-01 NOTE — TELEPHONE ENCOUNTER
Patient notified of antibiotic sent to pharmacy. Reminded to keep vv on Thursday with Dr. Nanci Rolle so he can follow up. Patient verbalizes understanding.

## 2022-02-01 NOTE — TELEPHONE ENCOUNTER
----- Message from Ermelinda Mejía sent at 1/31/2022  5:51 PM EST -----  Subject: Message to Provider    QUESTIONS  Information for Provider? pt returned call from office, please call pt   back. ---------------------------------------------------------------------------  --------------  [email or web address] [place] FREED  What is the best way for the office to contact you? Do not leave any   message, patient will call back for answer  Preferred Call Back Phone Number? [phone]  ---------------------------------------------------------------------------  --------------  SCRIPT ANSWERS  Relationship to Patient?  Self

## 2022-02-03 ENCOUNTER — VIRTUAL VISIT (OUTPATIENT)
Dept: FAMILY MEDICINE CLINIC | Age: 67
End: 2022-02-03
Payer: COMMERCIAL

## 2022-02-03 DIAGNOSIS — J06.9 ACUTE UPPER RESPIRATORY INFECTION: Primary | ICD-10-CM

## 2022-02-03 PROCEDURE — 99213 OFFICE O/P EST LOW 20 MIN: CPT | Performed by: FAMILY MEDICINE

## 2022-02-03 RX ORDER — PREDNISONE 10 MG/1
TABLET ORAL
Qty: 1 DOSE PACK | Refills: 0 | Status: SHIPPED | OUTPATIENT
Start: 2022-02-03 | End: 2022-06-24

## 2022-02-03 RX ORDER — FLUTICASONE PROPIONATE 50 MCG
2 SPRAY, SUSPENSION (ML) NASAL DAILY
Qty: 1 EACH | Refills: 1 | Status: SHIPPED | OUTPATIENT
Start: 2022-02-03

## 2022-02-03 NOTE — PROGRESS NOTES
Consent: Celio Dasilva, who was seen by synchronous (real-time) audio-video technology, and/or her healthcare decision maker, is aware that this patient-initiated, Telehealth encounter on 2/3/2022 is a billable service, with coverage as determined by her insurance carrier. She is aware that she may receive a bill and has provided verbal consent to proceed: YES-Consent obtained within past 12 months  712    Prior to Admission medications    Medication Sig Start Date End Date Taking? Authorizing Provider   fluticasone propionate (FLONASE) 50 mcg/actuation nasal spray 2 Sprays by Both Nostrils route daily. 2/3/22  Yes June MD Say   predniSONE [facility] DS) 10 mg dose pack 6 Day DS taper pack as directed 2/3/22  Yes June MD Say   azithromycin [facility]) 250 mg tablet Take two tablets today then one tablet daily 2/1/22 June MD Say   traZODone (DESYREL) 100 mg tablet Take 1 Tablet by mouth nightly. 12/1/21 June MD Say   escitalopram oxalate (LEXAPRO) 20 mg tablet TAKE 1 TABLET BY MOUTH ONCE DAILY 12/1/21 June MD Say   cyclobenzaprine (FLEXERIL) 10 mg tablet Take 1 tablet by mouth three times daily as needed for muscle spasm 11/30/21 June MD Say   busPIRone (BUSPAR) 7.5 mg tablet Take 1 Tablet by mouth two (2) times daily as needed (anxiety). 10/26/21   Eliana MD Say   glucagon 0.5 mg/0.1 mL syrg 0.5 mg by SubCUTAneous route as needed (Hypoglycemia). 9/27/21   Alina Lauren MD   dasiglucagon (Zegalogue Autoinjector) 0.6 mg/0.6 mL atIn 0.6 mg by SubCUTAneous route as needed (Hypoglycemia).  9/15/21   Alina Lauren MD   flash glucose sensor (FreeStyle Danna 2 Sensor) kit One sensor every 14 days 8/5/21   Alina Lauren MD   gabapentin (NEURONTIN) 300 mg capsule TAKE 1 CAPSULE BY MOUTH THREE TIMES DAILY AS NEEDED FOR PAIN 7/26/21 June MD jered Deal glucose scanning reader [facility] 14 Day [place]) misc Check sugars 4/14/21 June MD Say acetaminophen (Acetaminophen Pain Relief) 500 mg tablet Take 2 Tabs by mouth every six (6) hours as needed for Pain. 12/3/20   Bairon Kelley MD     Allergies   Allergen Reactions    Nsaids (Non-Steroidal Anti-Inflammatory Drug) Other (comments)     Gastric bypass           Assessment & Plan:   Diagnoses and all orders for this visit:    1. Acute upper respiratory infection  -     fluticasone propionate (FLONASE) 50 mcg/actuation nasal spray; 2 Sprays by Both Nostrils route daily. -     predniSONE (STERAPRED DS) 10 mg dose pack; 6 Day DS taper pack as directed  Patient having increasing congestion and headache. Started the Z-Deejay yesterday. Continue the Zyrtec once a day and add medicine as above. Medication Side Effects and Warnings were discussed with patient  Patient Labs were reviewed and or requested:  Patient Past Records were reviewed and or requested              We discussed the expected course, resolution and complications of the diagnosis(es) in detail. Medication risks, benefits, costs, interactions, and alternatives were discussed as indicated. I advised her to contact the office if her condition worsens, changes or fails to improve as anticipated. She expressed understanding with the diagnosis(es) and plan. Celio Dasilva, was evaluated through a synchronous (real-time) audio-video encounter. The patient (or guardian if applicable) is aware that this is a billable service, which includes applicable co-pays. This Virtual Visit was conducted with patient's (and/or legal guardian's) consent. The visit was conducted pursuant to the emergency declaration under the [facility], [address] [place] [address] authority and the [employer] Act. Patient identification was verified, and a caregiver was present when appropriate. The patient was located in a state where the provider was licensed to provide care.      Services were provided through a video synchronous discussion virtually to substitute for in-person clinic visit. Patient and provider were located at their individual homes. I have discussed the diagnosis with the patient and the intended plan as seen in the above orders. The patient understands and agrees with the plan. The patient has received an after-visit summary and questions were answered concerning future plans. Medication Side Effects and Warnings were discussed with patient  Patient Labs were reviewed and or requested:  Patient Past Records were reviewed and or requested    Catie Rahman M.D. There are no Patient Instructions on file for this visit.

## 2022-02-09 ENCOUNTER — TRANSCRIBE ORDER (OUTPATIENT)
Dept: SCHEDULING | Age: 67
End: 2022-02-09

## 2022-02-09 DIAGNOSIS — Z96.651 STATUS POST TOTAL RIGHT KNEE REPLACEMENT: Primary | ICD-10-CM

## 2022-02-09 DIAGNOSIS — F32.A DEPRESSION, UNSPECIFIED DEPRESSION TYPE: ICD-10-CM

## 2022-02-10 RX ORDER — ESCITALOPRAM OXALATE 20 MG/1
TABLET ORAL
Qty: 90 TABLET | Refills: 0 | Status: SHIPPED | OUTPATIENT
Start: 2022-02-10 | End: 2022-05-18 | Stop reason: SDUPTHER

## 2022-02-18 ENCOUNTER — HOSPITAL ENCOUNTER (OUTPATIENT)
Dept: CT IMAGING | Age: 67
Discharge: HOME | End: 2022-02-18
Attending: PHYSICAL MEDICINE & REHABILITATION
Payer: COMMERCIAL

## 2022-02-18 DIAGNOSIS — Z96.651 STATUS POST TOTAL RIGHT KNEE REPLACEMENT: ICD-10-CM

## 2022-02-18 PROCEDURE — 73700 CT LOWER EXTREMITY W/O DYE: CPT

## 2022-02-23 ENCOUNTER — OFFICE VISIT (OUTPATIENT)
Dept: ENDOCRINOLOGY | Age: 67
End: 2022-02-23
Payer: COMMERCIAL

## 2022-02-23 VITALS
DIASTOLIC BLOOD PRESSURE: 71 MMHG | HEIGHT: 61 IN | BODY MASS INDEX: 25.53 KG/M2 | SYSTOLIC BLOOD PRESSURE: 122 MMHG | WEIGHT: 135.2 LBS | OXYGEN SATURATION: 99 % | HEART RATE: 67 BPM | TEMPERATURE: 99 F

## 2022-02-23 DIAGNOSIS — E16.2 HYPOGLYCEMIA: Primary | ICD-10-CM

## 2022-02-23 DIAGNOSIS — Z98.84 S/P GASTRIC BYPASS: ICD-10-CM

## 2022-02-23 PROCEDURE — 95251 CONT GLUC MNTR ANALYSIS I&R: CPT | Performed by: INTERNAL MEDICINE

## 2022-02-23 PROCEDURE — 99214 OFFICE O/P EST MOD 30 MIN: CPT | Performed by: INTERNAL MEDICINE

## 2022-02-23 NOTE — PROGRESS NOTES
HISTORY OF PRESENT ILLNESS  Mariposa Luther is a 61 y.o. female. follow up visit after  Last  visit for  Hypoglycemias after  Nov 2021     GAINED  10 lbs   She had a fall from low sugar       Nov 2021     She had no severe low sugars  She has not passed out she says   Lost 4 lbs         Sept 2021     Lost 2 lbs ( she was afraid to use trulicity )   Using Velez 2   Unable to find  Test strips  -   She  Is very anxious  About  Going low and being alone to attend to her  ( as her   Has stroke )          August 2021   She is referred by pcp  Supposed to see Dr. Anuj Banks   As patient required immediate help, she is changed to my schedule   s/p bariatric surgery  In  July 2013 @ [facility]   By Dr. Null Pi   5 years after surgery  - she started  Noticing hypoglycemias   The hypoglycemias as happening more often and are going to as low as 45 mg   She is afraid that her  cannot be of help because of the stroke he had and is non verbal   She is on Velez 2 - she notices the discrepancy and hopes to get dexcom             Review of Systems   None       Physical Exam   Constitutional: She is oriented to person, place, and time. She appears well-developed and well-nourished. Psychiatric: She has a normal mood and affect. ASSESSMENT and PLAN      1. Hypoglycemia - s/p gastric bypass - reactive    She is in constant worry about \" dropping low \" and passing out .  Her  had a major stroke and is non verbal       Feb 2022   She never tried trulicity  For fear of brochure saying low sugars   She is definitely eating more carbs as she gained 10 lbs   It is understandable of  Her vicious cycle   She is to bring in the food log   Motivated her to  [facility]  report for 14 days and discussed with pt    - 14 day average glucose 88  - 2 % for high and  Very high   0%    23   % low sugars and % in Target  Range 74  %    - percent of utiization 70 %  - CV% 33.5 %      November 2021   Much lesser hypoglycemias   A1c by POC today is 4.9 %  =-  0.4 %  = 5.3 %      She has figured out to eat lot of protein food choices and learnt the balancing it   Reviewed   Libreview AGP  report for 14 days and discussed with pt    - 14 day average glucose 86  -0 % for high and    8   % low sugars and % in Target  Range  92 %    - percent of utiization 81 %  - CV% 17 %      2. S/p gastric bypass surgery - she has severe   Hypoglycemias     3.   Educated on hypoglycemia management, Sent zeagologue            Reviewed results with patient and discussed the labs being ordered today/bnv  Patient voiced understanding of plan of care

## 2022-02-23 NOTE — LETTER
2/24/2022    Patient: Hernan Calhoun   YOB: 1955   Date of Visit: 2/23/2022     Mayra Duval, [address] [facility] [address]  V[address] [facility] [address]    Dear Mayra Duval MD,      Thank you for referring Ms. Desmond Agosto to [address] [place] [address] for evaluation. My notes for this consultation are attached. If you have questions, please do not hesitate to call me. I look forward to following your patient along with you.       Sincerely,    Chante Carl MD

## 2022-02-23 NOTE — PROGRESS NOTES
Maxine Dominguez is a 61 y.o. female here for   Chief Complaint   Patient presents with    Diabetes       1. Have you been to the ER, urgent care clinic since your last visit? Hospitalized since your last visit? - no    2. Have you seen or consulted any other health care providers outside of the [address] [place] [address] since your last visit?   Include any pap smears or colon screening.- no

## 2022-03-15 ENCOUNTER — TELEPHONE (OUTPATIENT)
Dept: ENDOCRINOLOGY | Age: 67
End: 2022-03-15

## 2022-03-15 DIAGNOSIS — Z98.84 S/P GASTRIC BYPASS: ICD-10-CM

## 2022-03-15 DIAGNOSIS — E16.2 HYPOGLYCEMIA: Primary | ICD-10-CM

## 2022-03-15 RX ORDER — BLOOD-GLUCOSE METER
EACH MISCELLANEOUS
Qty: 1 EACH | Refills: 0 | Status: SHIPPED | OUTPATIENT
Start: 2022-03-15 | End: 2022-04-08 | Stop reason: SDUPTHER

## 2022-03-15 RX ORDER — BLOOD SUGAR DIAGNOSTIC
STRIP MISCELLANEOUS
Qty: 100 STRIP | Refills: 5 | Status: SHIPPED | OUTPATIENT
Start: 2022-03-15 | End: 2022-04-08 | Stop reason: SDUPTHER

## 2022-03-15 NOTE — TELEPHONE ENCOUNTER
Dexcom is definitely better over Larry Roman 2   We can try it thru  [employer] ( [employer] )- give that # to her

## 2022-03-15 NOTE — TELEPHONE ENCOUNTER
Contacted patient in reference to message that patient was not able to regulated blood sugars. Patient advises that blood glucose lows were in the 50's, but advises that she has been eating too many carbs to bring up her blood glucose and has been feeling lethargic throughout the day. She advises large spikes in blood glucose and makes her feel unable to do things. She has not been keeping a written log of her carb counting. She also advises that she wants to continue the Trulicity but would like something prescribed for the nausea.

## 2022-03-15 NOTE — TELEPHONE ENCOUNTER
Requested Prescriptions     Pending Prescriptions Disp Refills    Blood-Glucose Meter (OneTouch Verio Meter) misc 1 Each 0     Sig: Use to check blood glucose level twice daily DX E16.2    glucose blood VI test strips (OneTouch Verio test strips) strip 100 Strip 5     Sig: Use to check blood glucose level twice daily DX E16.2     Verbal order read back to Dr Yenni Us to send refill.

## 2022-03-15 NOTE — TELEPHONE ENCOUNTER
Returned call to patient. Advised her of Dr Jodi Donovan comments. She states that she has been keeping a log in her head of what she has been eating and how many carbs she has been eating. She drank a protein shake this morning when she got up (protien powder and coffee), for lunch she ate an egg sandwich on low carb bread. After she ate lunch her blood glucose level was 124 at 11:30 am then it dropped down to 56 at 1:30 pm. Patient inquires if Dr Kali Berg has bulled her Velez 2 report. Advised patient will attempt to pull it for Dr Kali Berg. Inquired if patient is cross checking her blood glucose level when she gets a low reading. She states that she does not but she can start. Inquired if she currently has a meter. She states that she does not because the one that she was using just . Advised patient can send in new meter for her. She verbalized understanding. She states that she will start keeping a food, carb, blood glucose level log for Dr Kali Berg and return call on Friday. Patient inquires if there is another CGM or meter that would be better for her than the Velez 2 since she is experiencing low's. Advised will check with Dr Kali Berg and see what she recommends.

## 2022-03-15 NOTE — TELEPHONE ENCOUNTER
Unfortunately, the nausea is a desirable side effect of trulicity.  So I am unable to presribe the anti-nausea med     I hope she starts keeping a journal back again with foods, carbs  and blood sugars     As she is post gastric bypass patient - sadly this is going to go in vicious cycles     Thad Cazares MD

## 2022-03-17 NOTE — TELEPHONE ENCOUNTER
Called patient and advised her of Dr Rahel Awad comments as well as the contact number for [employer]. Advised via message that if her insurance does not participate with [employer] to return call.

## 2022-03-18 PROBLEM — Z98.84 S/P BARIATRIC SURGERY: Status: ACTIVE | Noted: 2020-08-25

## 2022-03-18 PROBLEM — G47.00 INSOMNIA: Status: ACTIVE | Noted: 2020-12-05

## 2022-03-18 PROBLEM — E16.2 HYPOGLYCEMIA: Status: ACTIVE | Noted: 2021-04-15

## 2022-03-18 PROBLEM — Z96.651 S/P TKR (TOTAL KNEE REPLACEMENT), RIGHT: Status: ACTIVE | Noted: 2021-01-13

## 2022-03-19 PROBLEM — D64.9 ANEMIA: Status: ACTIVE | Noted: 2020-08-25

## 2022-03-19 PROBLEM — M17.11 ARTHRITIS OF RIGHT KNEE: Status: ACTIVE | Noted: 2020-12-07

## 2022-03-19 PROBLEM — D50.0 IRON DEFICIENCY ANEMIA DUE TO CHRONIC BLOOD LOSS: Status: ACTIVE | Noted: 2021-01-15

## 2022-03-20 PROBLEM — M17.11 PRIMARY OSTEOARTHRITIS OF RIGHT KNEE: Status: ACTIVE | Noted: 2020-12-03

## 2022-03-20 PROBLEM — R41.0 CONFUSION: Status: ACTIVE | Noted: 2020-12-06

## 2022-03-21 ENCOUNTER — TELEPHONE (OUTPATIENT)
Dept: ENDOCRINOLOGY | Age: 67
End: 2022-03-21

## 2022-03-22 NOTE — TELEPHONE ENCOUNTER
Called patient in reference to questions about Trulicity. Patient advised that she had some uncomfortable symptoms with with sample dose of Trulicity and wondered why the dose went up. I advised the patient that I was checking her note, and she then told me that Dr. Nery Argueta advised her that she would increase to the new dosage. As I was advising her that I could send Dr. Nery Argueta a message she told me that she was feeling much better and is now tolerating the trulicity much better. Patient also inquired about Dexcom paperwork to see if it was received. I advised her that I did not see it in her chart as of yet, but once I locate it, I can give her a call.

## 2022-03-24 ENCOUNTER — TRANSCRIBE ORDER (OUTPATIENT)
Dept: SCHEDULING | Age: 67
End: 2022-03-24

## 2022-03-24 DIAGNOSIS — Z96.651 STATUS POST TOTAL KNEE REPLACEMENT, RIGHT: Primary | ICD-10-CM

## 2022-03-29 ENCOUNTER — TELEPHONE (OUTPATIENT)
Dept: ENDOCRINOLOGY | Age: 67
End: 2022-03-29

## 2022-03-29 NOTE — TELEPHONE ENCOUNTER
Pt states she got [employer] [facility] [employer] to send over a request for a dexcom. This was 8 days ago and the supply company has not heard back from the office. Please advise as pt is waiting for a response.  LUIS

## 2022-03-30 ENCOUNTER — NURSE TRIAGE (OUTPATIENT)
Dept: OTHER | Facility: CLINIC | Age: 67
End: 2022-03-30

## 2022-03-30 NOTE — TELEPHONE ENCOUNTER
Called patient and advised her that her form was faxed today. She verbalized understanding and states that she has been feeling tired and weak recently. She states that she is worried about anemia and inquires if Dr Tavares Helms checked her iron levels with her last blood work. Advised patient that it looks like the last iron level that was checked was done by Dr Patel Cosme in December. Advised patient that if she is feeling weak and tired that she should follow up with Dr Patel Cosme. Patient inquires if Dr Tavares Helms ever recommends vitamin B12 injections for patients. Advised patient that she should follow up with Dr Patel Cosme regarding this as well. She verbalized understanding and states that she is going to call Dr Patel Cosme as soon she hangs up.

## 2022-03-30 NOTE — TELEPHONE ENCOUNTER
Received call from cheo  at [facility] with [employer] [place] Complaint. Subjective: Caller states dizziness     Current Symptoms:      75 y/o with low blood sugars   Currently 79 after treatment with 2 protein shakes  Pt reports dizziness         Onset: 1 month of symptoms  Worst   Dizziness  Today     Associated Symptoms:   Heart racing - chronic   Nausea  Diarrhea 4-5 days ago subsided  Reports  Blood sugar drops to 60's at night   Started trulicity 1 month ago   Pain Severity: n/a    Temperature:     What has been tried:  Treat  Low blood sugar with 2 protein shakes         Recommended disposition: See in Office Today    Care advice provided, patient verbalizes understanding; denies any other questions or concerns; instructed to call back for any new or worsening symptoms. Patient/Caller agrees with recommended disposition; writer provided warm transfer to sanaz at [facility] for appointment scheduling    Attention Provider: Thank you for allowing me to participate in the care of your patient. The patient was connected to triage in response to information provided to the [facility]. Please do not respond through this encounter as the response is not directed to a shared pool.       Reason for Disposition   Dizziness from low blood sugar (i.e., < 60 mg/dl or 3.5 mmol/l)   Patient wants to be seen    Protocols used: DIZZINESS-ADULT-OH, DIABETES - LOW BLOOD SUGAR-ADULT-OH

## 2022-04-04 ENCOUNTER — TELEPHONE (OUTPATIENT)
Dept: ENDOCRINOLOGY | Age: 67
End: 2022-04-04

## 2022-04-04 ENCOUNTER — HOSPITAL ENCOUNTER (OUTPATIENT)
Dept: NUCLEAR MEDICINE | Age: 67
Discharge: HOME | End: 2022-04-04
Attending: ORTHOPAEDIC SURGERY
Payer: MEDICARE

## 2022-04-04 DIAGNOSIS — Z96.651 STATUS POST TOTAL KNEE REPLACEMENT, RIGHT: ICD-10-CM

## 2022-04-04 PROCEDURE — 78300 BONE IMAGING LIMITED AREA: CPT

## 2022-04-04 NOTE — TELEPHONE ENCOUNTER
Patient called and advised that her insurance advised her that she could not get dexcom unless she was on insulin injections three times a week. Patient also advised that her blood sugars had been running in the low to mid 60's and she wasn't feeling very well. I advised the patient that I would talk with our Dexcom rep and try to get a resolution.

## 2022-04-05 NOTE — TELEPHONE ENCOUNTER
Dexcom is approved for type 2 diabetes patients who are on insulin so it will be very very difficult to get it approved for low sugars . Have to wait until Dr. Nuris Toure decides next step      Until then she has to check the blood glucose manually. Please make sure she is on high-protein diet, on an empty stomach not to eat simple sugars  Has she ever been on Acarbose ( medication for low sugars) or diazoxide. Acarbose 100 mg 30 minutes before each meal, this will reduce the episodes of low blood glucose.   Side effect is bloating

## 2022-04-08 DIAGNOSIS — Z98.84 S/P GASTRIC BYPASS: ICD-10-CM

## 2022-04-08 DIAGNOSIS — E16.2 HYPOGLYCEMIA: ICD-10-CM

## 2022-04-08 NOTE — TELEPHONE ENCOUNTER
Patient advised of BG perez from [facility] 2 being in the 40's. Patient and I had conversation about the [facility] system on April 4th. Spoke with patient in reference to [facility] message that Dr. Sony Yuan relayed on April 5th. Informed patient of plan stated in message and relayed Philippe's contact information to patient. Patient verbally acknowledges understanding.

## 2022-04-13 RX ORDER — ACARBOSE 100 MG/1
100 TABLET ORAL
Qty: 90 TABLET | Refills: 6 | Status: SHIPPED | OUTPATIENT
Start: 2022-04-13 | End: 2022-06-24 | Stop reason: SDUPTHER

## 2022-04-13 RX ORDER — BLOOD-GLUCOSE METER
EACH MISCELLANEOUS
Qty: 1 EACH | Refills: 0 | Status: SHIPPED | OUTPATIENT
Start: 2022-04-13 | End: 2022-08-29

## 2022-04-13 RX ORDER — BLOOD SUGAR DIAGNOSTIC
STRIP MISCELLANEOUS
Qty: 100 STRIP | Refills: 5 | Status: SHIPPED | OUTPATIENT
Start: 2022-04-13 | End: 2022-04-19 | Stop reason: SDUPTHER

## 2022-04-19 ENCOUNTER — TELEPHONE (OUTPATIENT)
Dept: ENDOCRINOLOGY | Age: 67
End: 2022-04-19

## 2022-04-19 DIAGNOSIS — E16.2 HYPOGLYCEMIA: ICD-10-CM

## 2022-04-19 DIAGNOSIS — Z98.84 S/P GASTRIC BYPASS: ICD-10-CM

## 2022-04-19 RX ORDER — DULAGLUTIDE 1.5 MG/.5ML
INJECTION, SOLUTION SUBCUTANEOUS
Qty: 4 ML | Refills: 0 | Status: SHIPPED | OUTPATIENT
Start: 2022-04-19 | End: 2022-06-02

## 2022-04-19 RX ORDER — GLUCAGON 1 MG
VIAL (EA) INJECTION
Qty: 1 EACH | Refills: 0 | Status: SHIPPED | OUTPATIENT
Start: 2022-04-19 | End: 2022-06-24 | Stop reason: SDUPTHER

## 2022-04-19 RX ORDER — BLOOD SUGAR DIAGNOSTIC
STRIP MISCELLANEOUS
Qty: 100 STRIP | Refills: 5 | Status: SHIPPED | OUTPATIENT
Start: 2022-04-19 | End: 2022-08-29

## 2022-04-19 NOTE — TELEPHONE ENCOUNTER
Message received from patient requesting a return call. She states that [facility] is requesting a letter of medical necessity with diagnosis code for Dexcom. She states that [facility] called her this morning asking for this letter. She states that she spoke with Avni at [facility] about the letter. Patient also requested a refill for One Touch Verio strips. Called [facility] and spoke to Veveo. Advised Shianti that patient states she was contacted this morning by Avni at [facility] requesting a LMN and diagnosis code. Veveo states that per the notes in her system Avni contacted the patient about a . Veveo states that she will fax the LMN now. Called patient and advised her of this. She verbalized understanding. Requested Prescriptions     Signed Prescriptions Disp Refills    glucose blood VI test strips (OneTouch Verio test strips) strip 100 Strip 5     Sig: Use to check blood glucose level twice daily DX E16.2     Authorizing Provider: Rafaela Selby     Ordering User: Monica Villa     Verbal order read back to Dr Melissa Kyle to send refill.

## 2022-05-18 DIAGNOSIS — F41.9 ANXIETY: ICD-10-CM

## 2022-05-18 DIAGNOSIS — F32.A DEPRESSION, UNSPECIFIED DEPRESSION TYPE: ICD-10-CM

## 2022-05-19 RX ORDER — ESCITALOPRAM OXALATE 20 MG/1
20 TABLET ORAL DAILY
Qty: 90 TABLET | Refills: 0 | Status: SHIPPED | OUTPATIENT
Start: 2022-05-19 | End: 2022-07-19

## 2022-05-19 RX ORDER — BUSPIRONE HYDROCHLORIDE 7.5 MG/1
7.5 TABLET ORAL
Qty: 180 TABLET | Refills: 3 | Status: SHIPPED | OUTPATIENT
Start: 2022-05-19

## 2022-05-20 ENCOUNTER — TELEPHONE (OUTPATIENT)
Dept: ENDOCRINOLOGY | Age: 67
End: 2022-05-20

## 2022-05-20 NOTE — TELEPHONE ENCOUNTER
Returned call to patient. Inquired if she is requesting a copay card or a voucher. Advised that a copay card will not work with any type of government insurance, but the voucher provides one month of medication free regardless of insurance coverage as long as she has never used one before. She inquires if the [employer] would work for her. She states that she has these forms and inquires if Dr Jaime Cohen can complete them for her. Advised that they cn be completed here at the office. She states that she will bring the forms and drop them off today.

## 2022-05-27 ENCOUNTER — NURSE TRIAGE (OUTPATIENT)
Dept: OTHER | Facility: CLINIC | Age: 67
End: 2022-05-27

## 2022-05-27 NOTE — TELEPHONE ENCOUNTER
Received incoming call transferred from [facility] ([facility]) requesting second level triage. Pt verified self and birth date. Reports worsening dizziness x1 week and feeling like she is going to pass out. Reports nausea consistently but states feeling like that daily since taking Trulicity. BS readings rage ~. Pt certain that she is feeling dizziness d/t iron levels. She was encouraged to be further evaluated by urgent care, but declined. Pt adamant that she will \"be fine\" and request to be scheduled specifically with MD Juan Carlos Jose for an appointment. Pt advised first available was Thursday for a virtual appointment. She accepted the appointment. Nurse reiterated that pt should be further evaluated by urgent care if sxs worsen over the holiday weekend. She acknowledged understanding with no additional questions or concerns.

## 2022-05-27 NOTE — TELEPHONE ENCOUNTER
Received call from Asya joseph  at [facility] with [employer] Complaint. Subjective: Caller states \"Luz been feeling really bad all week. My BS has been good. Im wondering if my iron is low or if I need a B12 shot. It is time for labs, I am a bariatric pt. I had gastric bypass 9 yrs ago. I felt this way before when my iron was low. I just tried to walk to the porch and I had to come back and sit back down due to feeling like passing out. I could barely get upstairs to barkre and egg earlier. \"     Current Symptoms: severe dizzy- weak and woozy like passing out  , nausea, fatigued , HA - severe     Onset: 1 week ago; worsening    Associated Symptoms: reduced activity, increased sleepiness    Pain Severity: HA 8/10 pressure and constant     Temperature: denies     What has been tried: protein and water, eating healthy foods, Zyrtec for HA due to thinking it was allergies      Recommended disposition: Go to ED/UCC Now (Or to Office with PCP Approval)- writer explained the concern for stroke with her symptoms and pt stated that her  has had a stroke and she is aware of the symptoms. Pt requested to schedule appt for Tuesday. Care advice provided, patient verbalizes understanding; denies any other questions or concerns; instructed to call back for any new or worsening symptoms. Writer provided warm transfer to [place]  at [address] [place] [address]  for further recommendation     Attention Provider: Thank you for allowing me to participate in the care of your patient. The patient was connected to triage in response to information provided to the [facility]. Please do not respond through this encounter as the response is not directed to a shared pool.       Reason for Disposition   SEVERE dizziness (e.g., unable to stand, requires support to walk, feels like passing out now)    Protocols used: DIZZINESS-ADULT-OH

## 2022-06-02 ENCOUNTER — OFFICE VISIT (OUTPATIENT)
Dept: FAMILY MEDICINE CLINIC | Age: 67
End: 2022-06-02
Payer: MEDICARE

## 2022-06-02 DIAGNOSIS — I10 HTN (HYPERTENSION), MALIGNANT: ICD-10-CM

## 2022-06-02 DIAGNOSIS — R11.0 NAUSEA: Primary | ICD-10-CM

## 2022-06-02 DIAGNOSIS — E03.1 CONGENITAL HYPOTHYROIDISM WITHOUT GOITER: ICD-10-CM

## 2022-06-02 DIAGNOSIS — R53.83 FATIGUE, UNSPECIFIED TYPE: ICD-10-CM

## 2022-06-02 DIAGNOSIS — E11.69 TYPE 2 DIABETES MELLITUS WITH OTHER SPECIFIED COMPLICATION, WITHOUT LONG-TERM CURRENT USE OF INSULIN (HCC): ICD-10-CM

## 2022-06-02 DIAGNOSIS — D64.9 ANEMIA, UNSPECIFIED TYPE: ICD-10-CM

## 2022-06-02 PROBLEM — E11.9 TYPE 2 DIABETES MELLITUS (HCC): Status: ACTIVE | Noted: 2022-06-02

## 2022-06-02 PROCEDURE — 3017F COLORECTAL CA SCREEN DOC REV: CPT | Performed by: FAMILY MEDICINE

## 2022-06-02 PROCEDURE — 2022F DILAT RTA XM EVC RTNOPTHY: CPT | Performed by: FAMILY MEDICINE

## 2022-06-02 PROCEDURE — 3046F HEMOGLOBIN A1C LEVEL >9.0%: CPT | Performed by: FAMILY MEDICINE

## 2022-06-02 PROCEDURE — G8400 PT W/DXA NO RESULTS DOC: HCPCS | Performed by: FAMILY MEDICINE

## 2022-06-02 PROCEDURE — 1090F PRES/ABSN URINE INCON ASSESS: CPT | Performed by: FAMILY MEDICINE

## 2022-06-02 PROCEDURE — G8427 DOCREV CUR MEDS BY ELIG CLIN: HCPCS | Performed by: FAMILY MEDICINE

## 2022-06-02 PROCEDURE — 1101F PT FALLS ASSESS-DOCD LE1/YR: CPT | Performed by: FAMILY MEDICINE

## 2022-06-02 PROCEDURE — G9899 SCRN MAM PERF RSLTS DOC: HCPCS | Performed by: FAMILY MEDICINE

## 2022-06-02 PROCEDURE — 99214 OFFICE O/P EST MOD 30 MIN: CPT | Performed by: FAMILY MEDICINE

## 2022-06-02 PROCEDURE — 1123F ACP DISCUSS/DSCN MKR DOCD: CPT | Performed by: FAMILY MEDICINE

## 2022-06-02 PROCEDURE — G8756 NO BP MEASURE DOC: HCPCS | Performed by: FAMILY MEDICINE

## 2022-06-02 PROCEDURE — G8419 CALC BMI OUT NRM PARAM NOF/U: HCPCS | Performed by: FAMILY MEDICINE

## 2022-06-02 PROCEDURE — G8536 NO DOC ELDER MAL SCRN: HCPCS | Performed by: FAMILY MEDICINE

## 2022-06-02 PROCEDURE — G9717 DOC PT DX DEP/BP F/U NT REQ: HCPCS | Performed by: FAMILY MEDICINE

## 2022-06-02 RX ORDER — ONDANSETRON 8 MG/1
8 TABLET, ORALLY DISINTEGRATING ORAL
Qty: 1 TABLET | Refills: 1 | Status: SHIPPED | OUTPATIENT
Start: 2022-06-02 | End: 2022-09-15

## 2022-06-02 RX ORDER — ONDANSETRON 8 MG/1
8 TABLET, ORALLY DISINTEGRATING ORAL
Qty: 30 TABLET | Refills: 1 | Status: SHIPPED | OUTPATIENT
Start: 2022-06-02 | End: 2022-06-24 | Stop reason: SDUPTHER

## 2022-06-02 RX ORDER — ONDANSETRON 8 MG/1
8 TABLET, ORALLY DISINTEGRATING ORAL
Qty: 30 TABLET | Refills: 1 | Status: SHIPPED | OUTPATIENT
Start: 2022-06-02 | End: 2022-06-02

## 2022-06-02 NOTE — PROGRESS NOTES
Consent: Eleanor Fuller, who was seen by synchronous (real-time) audio-video technology, and/or her healthcare decision maker, is aware that this patient-initiated, Telehealth encounter on 6/2/2022 is a billable service, with coverage as determined by her insurance carrier. She is aware that she may receive a bill and has provided verbal consent to proceed: YES-Consent obtained within past 12 months  712    Prior to Admission medications    Medication Sig Start Date End Date Taking? Authorizing Provider   ondansetron (ZOFRAN ODT) 8 mg disintegrating tablet Take 1 Tablet by mouth every eight (8) hours as needed for Nausea or Vomiting. 6/2/22  Yes Jerry Meraz MD   ondansetron (ZOFRAN ODT) 8 mg disintegrating tablet Take 1 Tablet by mouth every eight (8) hours as needed for Nausea or Vomiting. 6/2/22  Yes Jerry Meraz MD   ondansetron (ZOFRAN ODT) 8 mg disintegrating tablet Take 1 Tablet by mouth every eight (8) hours as needed for Nausea or Vomiting. 6/2/22 6/2/22  Jerry Meraz MD   busPIRone (BUSPAR) 7.5 mg tablet Take 1 Tablet by mouth two (2) times daily as needed (anxiety). 5/19/22   Jerry Meraz MD   escitalopram oxalate (LEXAPRO) 20 mg tablet Take 1 Tablet by mouth daily. 5/19/22   Jerry Meraz MD   glucose blood VI test strips (OneTouch Verio test strips) strip Use to check blood glucose level twice daily DX E16.2 4/19/22   Jonah Chandra MD   Glucagon Emergency Kit, human, 1 mg solr INJECT 0.5 MG SUBCUTANEOUSLY AS NEEDED FOR  HYPOGLYCEMIA. 4/19/22   Mary Grace Graham MD   Trulicity 1.5 YM/0.4 mL sub-q pen INJECT 0.5 ML SUBCUTANEOUSLY ONCE EVERY SEVEN (7) DAYS 4/19/22 6/2/22  Mary Grace Graham MD   Blood-Glucose Meter (OneTouch Verio Meter) misc Use to check blood glucose level twice daily DX E16.2 4/13/22   Mary Grace Graham MD   acarbose (PRECOSE) 100 mg tablet Take 1 Tablet by mouth three (3) times daily (with meals).  4/13/22   Mary Grace Graham MD   dulaglutide (Trulicity) 0.53 mg/0.5 mL sub-q pen 0.5 mL by SubCUTAneous route every seven (7) days. 2/23/22 6/2/22  Priyanka Sainz MD   fluticasone propionate (FLONASE) 50 mcg/actuation nasal spray 2 Sprays by Both Nostrils route daily. 2/3/22   Martínez Kennedy MD   predniSONE [facility] DS) 10 mg dose pack 6 Day DS taper pack as directed 2/3/22   Martínez Kennedy MD   [facility]) 250 mg tablet Take two tablets today then one tablet daily 2/1/22   Martínez Kennedy MD   traZODone (DESYREL) 100 mg tablet Take 1 Tablet by mouth nightly. 12/1/21   Martínez Kennedy MD   cyclobenzaprine (FLEXERIL) 10 mg tablet Take 1 tablet by mouth three times daily as needed for muscle spasm 11/30/21   Martínez Kennedy MD   dasiglucagon ([place] Autoinjector) 0.6 mg/0.6 mL atIn 0.6 mg by SubCUTAneous route as needed (Hypoglycemia). 9/15/21   Priyanka Sainz MD   flash glucose sensor (FreeStyle Danna 2 Sensor) kit One sensor every 14 days 8/5/21   Priyanka Sainz MD   gabapentin (NEURONTIN) 300 mg capsule TAKE 1 CAPSULE BY MOUTH THREE TIMES DAILY AS NEEDED FOR PAIN 7/26/21   Martínez Kennedy MD   acetaminophen (Acetaminophen Pain Relief) 500 mg tablet Take 2 Tabs by mouth every six (6) hours as needed for Pain. 12/3/20   Praneeth Santoyo MD     Allergies   Allergen Reactions    Nsaids (Non-Steroidal Anti-Inflammatory Drug) Other (comments)     Gastric bypass           Assessment & Plan:   Diagnoses and all orders for this visit:    1. Nausea  -     ondansetron (ZOFRAN ODT) 8 mg disintegrating tablet; Take 1 Tablet by mouth every eight (8) hours as needed for Nausea or Vomiting. Patient having significant nausea and vomiting over the past several months. Once daily she has been placed on Trulicity with a tapering dose in that timeframe. She was placed on this due to low blood sugars secondary to her gastric bypass. We will place her on nausea medicine and asked her to stop the Trulicity and see if this side effect reverses.   2. Type 2 diabetes mellitus with other specified complication, without long-term current use of insulin ([address]  Set up time for patient to come in for labs  3. Fatigue, unspecified type  Repeat lab work in 1 month  4. HTN (hypertension), malignant  Last blood pressures have been good. 5. Congenital hypothyroidism without goiter  Overdue for labs  6. Anemia, unspecified type  As above  Other orders  -     ondansetron (ZOFRAN ODT) 8 mg disintegrating tablet; Take 1 Tablet by mouth every eight (8) hours as needed for Nausea or Vomiting. Medication Side Effects and Warnings were discussed with patient  Patient Labs were reviewed and or requested:  Patient Past Records were reviewed and or requested              We discussed the expected course, resolution and complications of the diagnosis(es) in detail. Medication risks, benefits, costs, interactions, and alternatives were discussed as indicated. I advised her to contact the office if her condition worsens, changes or fails to improve as anticipated. She expressed understanding with the diagnosis(es) and plan. Fredy De Santiago, was evaluated through a synchronous (real-time) audio-video encounter. The patient (or guardian if applicable) is aware that this is a billable service, which includes applicable co-pays. This Virtual Visit was conducted with patient's (and/or legal guardian's) consent. The visit was conducted pursuant to the emergency declaration under the [address] [place] [address], [address] [place] [address] [facility] authority and the [employer] and [employer] General Act. Patient identification was verified, and a caregiver was present when appropriate. The patient was located in a state where the provider was licensed to provide care. Services were provided through a video synchronous discussion virtually to substitute for in-person clinic visit. Patient and provider were located at their individual homes.     I have discussed the diagnosis with the patient and the intended plan as seen in the above orders. The patient understands and agrees with the plan. The patient has received an after-visit summary and questions were answered concerning future plans. Medication Side Effects and Warnings were discussed with patient  Patient Labs were reviewed and or requested:  Patient Past Records were reviewed and or requested    Hawa Simmons M.D. There are no Patient Instructions on file for this visit.

## 2022-06-24 ENCOUNTER — OFFICE VISIT (OUTPATIENT)
Dept: ENDOCRINOLOGY | Age: 67
End: 2022-06-24
Payer: MEDICARE

## 2022-06-24 ENCOUNTER — TRANSCRIBE ORDER (OUTPATIENT)
Dept: SCHEDULING | Age: 67
End: 2022-06-24

## 2022-06-24 VITALS
SYSTOLIC BLOOD PRESSURE: 100 MMHG | OXYGEN SATURATION: 96 % | BODY MASS INDEX: 24.96 KG/M2 | HEART RATE: 65 BPM | HEIGHT: 61 IN | WEIGHT: 132.2 LBS | DIASTOLIC BLOOD PRESSURE: 63 MMHG | TEMPERATURE: 97.6 F

## 2022-06-24 DIAGNOSIS — R11.0 NAUSEA: ICD-10-CM

## 2022-06-24 DIAGNOSIS — E16.2 HYPOGLYCEMIA: ICD-10-CM

## 2022-06-24 DIAGNOSIS — Z98.84 S/P GASTRIC BYPASS: Primary | ICD-10-CM

## 2022-06-24 DIAGNOSIS — Z96.651 STATUS POST RIGHT PARTIAL KNEE REPLACEMENT: Primary | ICD-10-CM

## 2022-06-24 PROCEDURE — G8427 DOCREV CUR MEDS BY ELIG CLIN: HCPCS | Performed by: INTERNAL MEDICINE

## 2022-06-24 PROCEDURE — G8536 NO DOC ELDER MAL SCRN: HCPCS | Performed by: INTERNAL MEDICINE

## 2022-06-24 PROCEDURE — 1090F PRES/ABSN URINE INCON ASSESS: CPT | Performed by: INTERNAL MEDICINE

## 2022-06-24 PROCEDURE — G8400 PT W/DXA NO RESULTS DOC: HCPCS | Performed by: INTERNAL MEDICINE

## 2022-06-24 PROCEDURE — 95251 CONT GLUC MNTR ANALYSIS I&R: CPT | Performed by: INTERNAL MEDICINE

## 2022-06-24 PROCEDURE — 99214 OFFICE O/P EST MOD 30 MIN: CPT | Performed by: INTERNAL MEDICINE

## 2022-06-24 PROCEDURE — 3017F COLORECTAL CA SCREEN DOC REV: CPT | Performed by: INTERNAL MEDICINE

## 2022-06-24 PROCEDURE — G8752 SYS BP LESS 140: HCPCS | Performed by: INTERNAL MEDICINE

## 2022-06-24 PROCEDURE — G9717 DOC PT DX DEP/BP F/U NT REQ: HCPCS | Performed by: INTERNAL MEDICINE

## 2022-06-24 PROCEDURE — 1101F PT FALLS ASSESS-DOCD LE1/YR: CPT | Performed by: INTERNAL MEDICINE

## 2022-06-24 PROCEDURE — G9899 SCRN MAM PERF RSLTS DOC: HCPCS | Performed by: INTERNAL MEDICINE

## 2022-06-24 PROCEDURE — G8420 CALC BMI NORM PARAMETERS: HCPCS | Performed by: INTERNAL MEDICINE

## 2022-06-24 PROCEDURE — 1123F ACP DISCUSS/DSCN MKR DOCD: CPT | Performed by: INTERNAL MEDICINE

## 2022-06-24 PROCEDURE — G8754 DIAS BP LESS 90: HCPCS | Performed by: INTERNAL MEDICINE

## 2022-06-24 RX ORDER — DICLOFENAC SODIUM 10 MG/G
GEL TOPICAL
COMMUNITY
Start: 2022-04-19

## 2022-06-24 RX ORDER — GLUCAGON 1 MG
VIAL (EA) INJECTION
Qty: 1 EACH | Refills: 1 | Status: SHIPPED | OUTPATIENT
Start: 2022-06-24 | End: 2022-08-29 | Stop reason: SDUPTHER

## 2022-06-24 RX ORDER — FERROUS SULFATE 324(65)MG
324 TABLET, DELAYED RELEASE (ENTERIC COATED) ORAL 2 TIMES DAILY WITH MEALS
COMMUNITY

## 2022-06-24 RX ORDER — TRAMADOL HYDROCHLORIDE 50 MG/1
50 TABLET ORAL
COMMUNITY
Start: 2021-10-19 | End: 2022-09-15

## 2022-06-24 RX ORDER — ACARBOSE 25 MG/1
TABLET ORAL
Qty: 270 TABLET | Refills: 0 | Status: SHIPPED | OUTPATIENT
Start: 2022-06-24

## 2022-06-24 RX ORDER — DULAGLUTIDE 0.75 MG/.5ML
INJECTION, SOLUTION SUBCUTANEOUS
COMMUNITY
Start: 2022-05-24

## 2022-06-24 RX ORDER — ONDANSETRON 8 MG/1
8 TABLET, ORALLY DISINTEGRATING ORAL
Qty: 90 TABLET | Refills: 1 | Status: SHIPPED | OUTPATIENT
Start: 2022-06-24 | End: 2022-08-29

## 2022-06-24 RX ORDER — ACARBOSE 100 MG/1
100 TABLET ORAL
Qty: 270 TABLET | Refills: 3 | Status: SHIPPED | OUTPATIENT
Start: 2022-06-24

## 2022-06-24 NOTE — PATIENT INSTRUCTIONS
SPECIFIC INSTRUCTIONS BELOW         ACARBOSE 25 MG RIGHT BEFORE EACH MEAL        OPTIONAL   trulicity    BY COST       zofran  - FOR TRULICITY       -------------PAY ATTENTION TO THESE GENERAL INSTRUCTIONS -----------------      - The medications prescribed at this visit will not be available at pharmacy until 6 pm       - YOUR MED LIST IS NOT UP TO DATE AS SOME CHANGES ARE BEING MADE AFTER THE VISIT - FOLLOW SPECIFIC INSTRUCTIONS  ABOVE     -ANY tests other than blood work, which you opt to do  outside the  [facility] facilities, you are responsible for prior authorizations if  required    - [address] [facility]- PLEASE IGNORE     Results     *Normal results will not be notified by a phone call starting January 1 2021   *If you have an upcoming visit, the results will be discussed at the visit   *Please sign up for MY CHART if you want access to your lab and test results  *Abnormal results which require immediate attention will be notified by phone call   *Abnormal results which do not require immediate assistance will be notified in 1-2 weeks       Refills    -    have your pharmacy send us a refill request . Refills are done max for one year and a visit is a must before refills are extended    Follow up appointments -  highly encourage you to make it when you are checking out. We can accommodate you into the schedule based on your clinical situation, but not for extending refills beyond a year. Labs are important to give refills and is important to get labs before the visit     Phone calls  -  Allow  24 hrs.  for non-urgent calls to be returned  Prior authorization - It may take 2-4 weeks to process  Forms  -  FMLA, DMV etc., will take up to 2 weeks to process  Cancellations - please notify the office 2 days in advance   Samples  - will only be dispensed at visits       If not showing for the appointments and cancelling appointments within 24 hours are kept track of and three  of such situations in  two consecutive years will likely be considered for termination from the practice    -------------------------------------------------------------------------------------------------------------------

## 2022-06-24 NOTE — PROGRESS NOTES
HISTORY OF PRESENT ILLNESS      Jessica  is a 74  y.o. female.     follow up visit after  Last  visit for  Hypoglycemias after  feb 2022     In the interim, she got DEXCOM gladly  But happened to change insurance carrier   Unable to get over the nausea from trulicity   She is doing well with dexcom - says he has  to not exceed 8 gm of carbs   Lost 3 lbs       Feb 2022       GAINED  10 lbs   She had a fall from low sugar         Nov 2021      She had no severe low sugars  She has not passed out she says   Lost 4 lbs           August 2021   She is referred by pcp  Supposed to see Dr. Jose Sellers   As patient required immediate help, she is changed to my schedule   s/p bariatric surgery  In  July 2013 @ [facility]   By Dr. Lencho Kapadia   5 years after surgery  - she started  Noticing hypoglycemias   The hypoglycemias as happening more often and are going to as low as 45 mg   She is afraid that her  cannot be of help because of the stroke he had and is non verbal   She is on Danna 2 - she notices the discrepancy and hopes to get dexcom                  Review of Systems   None         Physical Exam   Constitutional: She is oriented to person, place, and time. She appears well-developed and well-nourished. Psychiatric: She has a normal mood and affect.         ASSESSMENT and PLAN        1. Hypoglycemia - s/p gastric bypass - reactive    She is in constant worry about \" dropping low \" and passing out .  Her  had a major stroke and is non verbal         June 2022     the MOVE to dexcom has really helped her tremendously and she has figured out the  \" diet \" which fits her   Ricka Galindo is c/o  Nausea   From   trulicity   She wants to control it by diet ( < 8 gm of sugars per meal )  And by using acarbose only        Reviewed   DEXCOM clarity downloaded report for 14 days and discussed with pt    - dexcom - a1c is 5.6  % for 14 days   - 14 day average glucose 145  -  1 % for high and   2 %  low sugars and % in Target  Range  95  %    - percent of utiization 93 %  - CV% 24    The surgeon has offered  Last resort  To reverse the  Gastric bypass surgery               Feb 2022   She never tried trulicity  For fear of brochure saying low sugars   She is definitely eating more carbs as she gained 10 lbs   It is understandable of  Her vicious cycle   She is to bring in the food log   Mot[facility]  [facility]  [facility] and discussed with pt    - 14 day average glucose 88  - 2 % for high and  Very high   0%    23   % low sugars and % in Target  Range 74  %    - percent of utiization 70 %  - CV% 33.5 %        November 2021   Much lesser hypoglycemias   A1c by POC today is 4.9 %  =-  0.4 %  = 5.3 %       She has figured out to eat lot of protein food choices and learnt the balancing it   [facility]   [facility] and discussed with pt    - 14 day average glucose 86  -0 % for high and    8   % low sugars and % in Target  Range  92 %    - percent of utiization 81 %  - CV% 17 %        2.  S/p gastric bypass surgery - she has severe   Hypoglycemias      3.  Educated on hypoglycemia management, Sent GLUCAGON            Reviewed results with patient and discussed the labs being ordered today/bnv  Patient voiced understanding of plan of care

## 2022-06-24 NOTE — LETTER
6/26/2022    Patient: Neto Murillo   YOB: 1955   Date of Visit: 6/24/2022     Charlie Stephens, [address] [facility]  [address] [place]    Dear Charlie Stephens MD,      Thank you for referring Ms. Jeaneth Aiken to [id number] [place] [address] for evaluation. My notes for this consultation are attached. If you have questions, please do not hesitate to call me. I look forward to following your patient along with you.       Sincerely,    Zeenat Rodrigues MD

## 2022-06-28 NOTE — TELEPHONE ENCOUNTER
Pt would like a call about her labs What Type Of Note Output Would You Prefer (Optional)?: Bullet Format How Severe Are Your Spot(S)?: mild Have Your Spot(S) Been Treated In The Past?: has not been treated Hpi Title: Evaluation of Skin Lesions Additional History: FSE today.  No new concerns. Location: Left cheek Year Removed: Exc 9/2020

## 2022-07-15 ENCOUNTER — HOSPITAL ENCOUNTER (OUTPATIENT)
Dept: NUCLEAR MEDICINE | Age: 67
Discharge: HOME | End: 2022-07-15
Attending: ORTHOPAEDIC SURGERY
Payer: MEDICARE

## 2022-07-15 DIAGNOSIS — Z96.651 STATUS POST RIGHT PARTIAL KNEE REPLACEMENT: ICD-10-CM

## 2022-07-15 PROCEDURE — 78300 BONE IMAGING LIMITED AREA: CPT

## 2022-07-19 DIAGNOSIS — F32.A DEPRESSION, UNSPECIFIED DEPRESSION TYPE: ICD-10-CM

## 2022-07-19 RX ORDER — ESCITALOPRAM OXALATE 20 MG/1
TABLET ORAL
Qty: 90 TABLET | Refills: 0 | Status: SHIPPED | OUTPATIENT
Start: 2022-07-19 | End: 2022-07-28

## 2022-07-28 DIAGNOSIS — F32.A DEPRESSION, UNSPECIFIED DEPRESSION TYPE: ICD-10-CM

## 2022-07-28 RX ORDER — ESCITALOPRAM OXALATE 20 MG/1
TABLET ORAL
Qty: 90 TABLET | Refills: 3 | Status: SHIPPED | OUTPATIENT
Start: 2022-07-28

## 2022-08-01 RX ORDER — TRAZODONE HYDROCHLORIDE 100 MG/1
100 TABLET ORAL
Qty: 90 TABLET | Refills: 3 | Status: SHIPPED | OUTPATIENT
Start: 2022-08-01

## 2022-08-29 ENCOUNTER — HOSPITAL ENCOUNTER (OUTPATIENT)
Dept: PREADMISSION TESTING | Age: 67
Discharge: HOME | End: 2022-08-29
Payer: MEDICARE

## 2022-08-29 VITALS
WEIGHT: 133.1 LBS | DIASTOLIC BLOOD PRESSURE: 63 MMHG | BODY MASS INDEX: 25.13 KG/M2 | HEART RATE: 74 BPM | HEIGHT: 61 IN | SYSTOLIC BLOOD PRESSURE: 126 MMHG | TEMPERATURE: 98 F | RESPIRATION RATE: 16 BRPM | OXYGEN SATURATION: 98 %

## 2022-08-29 DIAGNOSIS — E16.2 HYPOGLYCEMIA: Primary | ICD-10-CM

## 2022-08-29 LAB
ABO + RH BLD: NORMAL
ALBUMIN SERPL-MCNC: 3.9 G/DL (ref 3.5–5)
ALBUMIN/GLOB SERPL: 1.4 {RATIO} (ref 1.1–2.2)
ALP SERPL-CCNC: 62 U/L (ref 45–117)
ALT SERPL-CCNC: 101 U/L (ref 12–78)
ANION GAP SERPL CALC-SCNC: 4 MMOL/L (ref 5–15)
APPEARANCE UR: CLEAR
APTT PPP: 28.5 SEC (ref 22.1–31)
AST SERPL-CCNC: 66 U/L (ref 15–37)
BACTERIA URNS QL MICRO: ABNORMAL /HPF
BASOPHILS # BLD: 0.1 K/UL (ref 0–0.1)
BASOPHILS NFR BLD: 1 % (ref 0–1)
BILIRUB SERPL-MCNC: 0.6 MG/DL (ref 0.2–1)
BILIRUB UR QL: NEGATIVE
BLOOD GROUP ANTIBODIES SERPL: NORMAL
BUN SERPL-MCNC: 25 MG/DL (ref 6–20)
BUN/CREAT SERPL: 42 (ref 12–20)
CALCIUM SERPL-MCNC: 9.4 MG/DL (ref 8.5–10.1)
CHLORIDE SERPL-SCNC: 108 MMOL/L (ref 97–108)
CO2 SERPL-SCNC: 27 MMOL/L (ref 21–32)
COLOR UR: ABNORMAL
CREAT SERPL-MCNC: 0.6 MG/DL (ref 0.55–1.02)
CRP SERPL-MCNC: <0.29 MG/DL (ref 0–0.6)
DIFFERENTIAL METHOD BLD: ABNORMAL
EOSINOPHIL # BLD: 0.1 K/UL (ref 0–0.4)
EOSINOPHIL NFR BLD: 1 % (ref 0–7)
EPITH CASTS URNS QL MICRO: ABNORMAL /LPF
ERYTHROCYTE [DISTWIDTH] IN BLOOD BY AUTOMATED COUNT: 15.1 % (ref 11.5–14.5)
ERYTHROCYTE [SEDIMENTATION RATE] IN BLOOD: 5 MM/HR (ref 0–30)
EST. AVERAGE GLUCOSE BLD GHB EST-MCNC: 100 MG/DL
GLOBULIN SER CALC-MCNC: 2.8 G/DL (ref 2–4)
GLUCOSE SERPL-MCNC: 82 MG/DL (ref 65–100)
GLUCOSE UR STRIP.AUTO-MCNC: NEGATIVE MG/DL
HBA1C MFR BLD: 5.1 % (ref 4–5.6)
HCT VFR BLD AUTO: 39.5 % (ref 35–47)
HGB BLD-MCNC: 12.9 G/DL (ref 11.5–16)
HGB UR QL STRIP: NEGATIVE
HYALINE CASTS URNS QL MICRO: ABNORMAL /LPF (ref 0–2)
IMM GRANULOCYTES # BLD AUTO: 0 K/UL (ref 0–0.04)
IMM GRANULOCYTES NFR BLD AUTO: 0 % (ref 0–0.5)
INR PPP: 1 (ref 0.9–1.1)
KETONES UR QL STRIP.AUTO: NEGATIVE MG/DL
LEUKOCYTE ESTERASE UR QL STRIP.AUTO: ABNORMAL
LYMPHOCYTES # BLD: 1.5 K/UL (ref 0.8–3.5)
LYMPHOCYTES NFR BLD: 22 % (ref 12–49)
MCH RBC QN AUTO: 30.3 PG (ref 26–34)
MCHC RBC AUTO-ENTMCNC: 32.7 G/DL (ref 30–36.5)
MCV RBC AUTO: 92.7 FL (ref 80–99)
MONOCYTES # BLD: 0.4 K/UL (ref 0–1)
MONOCYTES NFR BLD: 5 % (ref 5–13)
NEUTS SEG # BLD: 4.6 K/UL (ref 1.8–8)
NEUTS SEG NFR BLD: 71 % (ref 32–75)
NITRITE UR QL STRIP.AUTO: NEGATIVE
NRBC # BLD: 0 K/UL (ref 0–0.01)
NRBC BLD-RTO: 0 PER 100 WBC
PH UR STRIP: 5.5 [PH] (ref 5–8)
PLATELET # BLD AUTO: 210 K/UL (ref 150–400)
PMV BLD AUTO: 12.1 FL (ref 8.9–12.9)
POTASSIUM SERPL-SCNC: 4.4 MMOL/L (ref 3.5–5.1)
PROT SERPL-MCNC: 6.7 G/DL (ref 6.4–8.2)
PROT UR STRIP-MCNC: NEGATIVE MG/DL
PROTHROMBIN TIME: 10.1 SEC (ref 9–11.1)
RBC # BLD AUTO: 4.26 M/UL (ref 3.8–5.2)
RBC #/AREA URNS HPF: ABNORMAL /HPF (ref 0–5)
SODIUM SERPL-SCNC: 139 MMOL/L (ref 136–145)
SP GR UR REFRACTOMETRY: 1.01 (ref 1–1.03)
SPECIMEN EXP DATE BLD: NORMAL
THERAPEUTIC RANGE,PTTT: NORMAL SECS (ref 58–77)
UA: UC IF INDICATED,UAUC: ABNORMAL
UROBILINOGEN UR QL STRIP.AUTO: 0.2 EU/DL (ref 0.2–1)
WBC # BLD AUTO: 6.6 K/UL (ref 3.6–11)
WBC URNS QL MICRO: ABNORMAL /HPF (ref 0–4)

## 2022-08-29 PROCEDURE — 84466 ASSAY OF TRANSFERRIN: CPT

## 2022-08-29 PROCEDURE — 83036 HEMOGLOBIN GLYCOSYLATED A1C: CPT

## 2022-08-29 PROCEDURE — 87186 SC STD MICRODIL/AGAR DIL: CPT

## 2022-08-29 PROCEDURE — 87077 CULTURE AEROBIC IDENTIFY: CPT

## 2022-08-29 PROCEDURE — 85610 PROTHROMBIN TIME: CPT

## 2022-08-29 PROCEDURE — 93005 ELECTROCARDIOGRAM TRACING: CPT

## 2022-08-29 PROCEDURE — 86140 C-REACTIVE PROTEIN: CPT

## 2022-08-29 PROCEDURE — 85025 COMPLETE CBC W/AUTO DIFF WBC: CPT

## 2022-08-29 PROCEDURE — 81001 URINALYSIS AUTO W/SCOPE: CPT

## 2022-08-29 PROCEDURE — 36415 COLL VENOUS BLD VENIPUNCTURE: CPT

## 2022-08-29 PROCEDURE — 80053 COMPREHEN METABOLIC PANEL: CPT

## 2022-08-29 PROCEDURE — 85730 THROMBOPLASTIN TIME PARTIAL: CPT

## 2022-08-29 PROCEDURE — 85652 RBC SED RATE AUTOMATED: CPT

## 2022-08-29 PROCEDURE — 86900 BLOOD TYPING SEROLOGIC ABO: CPT

## 2022-08-29 PROCEDURE — 87086 URINE CULTURE/COLONY COUNT: CPT

## 2022-08-29 PROCEDURE — APPNB30 APP NON BILLABLE TIME 0-30 MINS: Performed by: NURSE PRACTITIONER

## 2022-08-29 RX ORDER — BISMUTH SUBSALICYLATE 262 MG
1 TABLET,CHEWABLE ORAL
COMMUNITY

## 2022-08-29 NOTE — PERIOP NOTES
[address] [address] [facility]   MAIN OR                                  [phone]   MAIN PRE OP                          [phone]                                                                                AMBULATORY PRE OP          [phone]  PRE-ADMISSION TESTING    [phone]   Surgery Date:  Monday 9/12/22       Is surgery arrival time given by surgeon? NO  If NO, [facility] staff will call you between 3 and 7pm the day before your surgery with your arrival time. (If your surgery is on a Monday, we will call you the Friday before.)    Call [phone] after 7pm Monday-Friday if you did not receive this call. INSTRUCTIONS BEFORE YOUR SURGERY   When You  Arrive Arrive at the 2nd [address] [facility] on the day of your surgery  Have your insurance card, photo ID, and any copayment (if needed)   Food   and   Drink NO food or drink after midnight the night before surgery    This means NO water, gum, mints, coffee, juice, etc.  No alcohol (beer, wine, liquor) 24 hours before and after surgery   Medications to   TAKE   Morning of Surgery MEDICATIONS TO TAKE THE MORNING OF SURGERY WITH A SIP OF WATER:   Buspirone  Fluticasone (Flonase) if needed   Medications  To  STOP      7 days before surgery Non-Steroidal anti-inflammatory Drugs (NSAID's): for example, Ibuprofen (Advil, Motrin), Naproxen (Aleve), Diclofenac  Aspirin, if taking for pain   Herbal supplements, vitamins, and fish oil  Other:  (Pain medications not listed above, including Tylenol may be taken)   Blood  Thinners N/a   Bathing Clothing  Jewelry  Valuables     If you shower the morning of surgery, please do not apply anything to your skin (lotions, powders, deodorant, or makeup, especially mascara)  Follow Chlorhexidine Care Fusion body wash instructions provided to you during PAT appointment. Begin 3 days prior to surgery.   Do not shave or trim anywhere 24 hours before surgery  Wear your hair loose or down; no pony-tails, buns, or metal hair clips  Wear loose, comfortable, clean clothes  Wear glasses instead of contacts  Leave money, valuables, and jewelry, including body piercings, at home  If you were given an Eqalix, bring it on day of surgery. Going Home - or Spending the Night SAME-DAY SURGERY: You must have a responsible adult drive you home and stay with you 24 hours after surgery  ADMITS: If your doctor is keeping you in the hospital after surgery, leave personal belongings/luggage in your car until you have a hospital room number. Hospital discharge time is 12 noon  Drivers must be here before 12 noon unless you are told differently   Special Instructions 16. Special Instructions:  Use Chlorhexidine Care Fusion wash and sponges 3 days prior to surgery as instructed. Incentive spirometer given with instructions to practice at home and bring back to the hospital on the day of surgery. [facility] will contact you if your Hemoglobin A1C is greater than 7.5. Ensure/Glucerna   Pain pamphlet and Call Don't Fall reminder reviewed with patient.  parking is complimentary Monday - Friday 7 am - 5:30 pm  Bring PTA Medication list day of surgery with the last doses taken documented   Do not bring medication bottles the day of surgery        Follow all instructions so your surgery wont be cancelled. Please, be on time. If a situation occurs and you are delayed the day of surgery, call  [phone]. If your physical condition changes (like a fever, cold, flu, etc.) call your surgeon. Home medication(s) reviewed and verified via   LIST   VERBAL   during PAT appointment. The patient was contacted by IN-PERSON  The patient verbalizes understanding of all instructions and   DOES NOT   need reinforcement.

## 2022-08-29 NOTE — PERIOP NOTES
Patient states that she is a DNR, does not have copy with her. Instructed patient to bring copy of her DNR order with her day of surgery. Patient also wants to wait to sign consent until DOS to talk to Dr Lisa Torres regarding DNR status.

## 2022-08-29 NOTE — H&P
History and Physical    Patient: Preston Hernández MRN: [id number]  SSN: [id number]    YOB: 1955  Age: 74 y.o. Sex: female      CC: Right knee pain    Subjective:      Preston Hernández is a 74 y.o. female referred for pre-operative evaluation by Dr. Serina Law for surgery on 22. Maurice Vogel notes she had her right knee replaced in 2020 and had been saying something was not right with her knee. She then had a fall in October where she passed out and fell down some steps. She started loosing ROM after the fall. Testing shows some loose hardware. She notes pain is located in the knee and thigh. She has frequent falls because of hypoglycemia post bariatric sx in . The most recent fall was last week. The patient was evaluated in the surgeon's office and it was determined that the most appropriate plan of care is to proceed with surgical intervention.   Patient's PCP Nu Gomez MD    Past Medical History:   Diagnosis Date    Adverse effect of other narcotics, sequela     Most pills cause hallucination and nausea    Anxiety and depression     Asthma     Benign heart murmur     Difficult intubation 2012    Patient states she has a small mouth    Hypoglycemia 10/2020    Passes out    Hypothyroidism     Iron deficiency anemia     Osteoarthritis     PONV (postoperative nausea and vomiting)     Snoring     Syncopal episodes 2022    Post Gastric bypass- food helps    Urinary incontinence      Past Surgical History:   Procedure Laterality Date    HX CATARACT REMOVAL Bilateral  &     HX CERVICAL FUSION      C2-7    HX  SECTION  X2    HX CHOLECYSTECTOMY  2018    HX GASTRIC BYPASS      HX HYSTERECTOMY  1992    HX KNEE ARTHROSCOPY Right     HX KNEE REPLACEMENT Right 2020    HX TONSILLECTOMY      HX WISDOM TEETH EXTRACTION        Family History   Problem Relation Age of Onset    Heart Disease Mother     Post-op Nausea/Vomiting Mother     Cancer Father PROSTATE TO SPINE    Clotting Disorder Neg Hx      Social History     Tobacco Use    Smoking status: Former     Packs/day: 0.25     Types: Cigarettes     Quit date: 2019     Years since quitting: 3.6    Smokeless tobacco: Never   Substance Use Topics    Alcohol use: No     Alcohol/week: 0.0 standard drinks    Drug use: No      Prior to Admission medications    Medication Sig Start Date End Date Taking? Authorizing Provider   CALCIUM PO Take 1 Tablet by mouth three (3) times daily (with meals). Yes Provider, Historical   multivitamin (ONE A DAY) tablet Take 1 Tablet by mouth three (3) times daily (with meals). Bariatric Multi vitamin   Yes Provider, Historical   ergocalciferol, vitamin D2, (VITAMIN D2 PO) Take 1 Tablet by mouth two (2) times a day. Yes Provider, Historical   MAGNESIUM PO Take 1 Tablet by mouth nightly. Yes Provider, Historical   traZODone (DESYREL) 100 mg tablet Take 1 Tablet by mouth nightly. 8/1/22  Yes Annabelle Powell MD   escitalopram oxalate (LEXAPRO) 20 mg tablet TAKE 1 TABLET BY MOUTH  DAILY  Patient taking differently: nightly. 7/28/22  Yes Annabelle Powell MD   dulaglutide (Trulicity) 0.52 YL/9.0 mL sub-q pen sundays 5/24/22  Yes Provider, Historical   ferrous sulfate 324 mg (65 mg iron) tablet Take 324 mg by mouth two (2) times daily (with meals). Yes Provider, Historical   diclofenac (VOLTAREN) 1 % gel APPLY 2 GRAMS TOPICALLY TO AFFECTED AREA EVERY 6 HOURS FOR 23 DAYS 4/19/22  Yes Provider, Historical   traMADoL (ULTRAM) 50 mg tablet Take 50 mg by mouth every eight (8) hours as needed. 10/19/21  Yes Provider, Historical   acarbose (PRECOSE) 100 mg tablet Take 1 Tablet by mouth three (3) times daily (with meals). 6/24/22  Yes Dianna Baker MD   acarbose (PRECOSE) 25 mg tablet One pill right before each meal 6/24/22  Yes Dianna Baker MD   ondansetron (ZOFRAN ODT) 8 mg disintegrating tablet Take 1 Tablet by mouth every eight (8) hours as needed for Nausea or Vomiting.  6/2/22  Yes Marine Morel MD   busPIRone (BUSPAR) 7.5 mg tablet Take 1 Tablet by mouth two (2) times daily as needed (anxiety). 5/19/22  Yes Marine Morel MD   fluticasone propionate (FLONASE) 50 mcg/actuation nasal spray 2 Sprays by Both Nostrils route daily. 2/3/22  Yes Marine Morel MD   acetaminophen (Acetaminophen Pain Relief) 500 mg tablet Take 2 Tabs by mouth every six (6) hours as needed for Pain. 12/3/20  Yes Yvan Solis MD   cefUROXime (CEFTIN) 250 mg tablet Take 1 Tablet by mouth two (2) times a day for 5 days. Indications: urinary tract infection due to E. coli bacteria 9/1/22 9/6/22  Mackenzie Abraham NP   glucagon (Glucagon Emergency Kit, human,) 1 mg solr INJECT 0.5 MG SUBCUTANEOUSLY AS NEEDED FOR  HYPOGLYCEMIA. 8/30/22   Darron Emanuel MD        Allergies   Allergen Reactions    Nsaids (Non-Steroidal Anti-Inflammatory Drug) Other (comments)     Gastric bypass       Review of Systems:  Constitutional: Negative for chills and fever  HENT: Negative for congestion and sore throat  Eyes: negative for blurred vision and double vision  Respiratory: Negative for cough, shortness of breath and wheezing  Mouth: Negative for loose, broken or chipped teeth. Cardiovascular: Negative for chest pain and palpitations  Gastrointestinal: Negative for abdominal pain, constipation, diarrhea and nausea  Genitourinary: Negative for dysuria and hematuria  Musculoskeletal: Right knee pain  Skin: Negative for rash, open wounds.    Neurological: Negative for dizziness, tremors and headaches  Psychiatric: Negative for anxiety    Objective:     Vitals:    08/29/22 1124   BP: 126/63   Pulse: 74   Resp: 16   Temp: 98 °F (36.7 °C)   SpO2: 98%   Weight: 60.4 kg (133 lb 1.6 oz)   Height: 5' 1\" (1.549 m)        Physical Exam:  General Appearance: Alert, Well Appearing and in no distress  Mental Status: Normal mood, behavior, speech and dress  Neck: Normal appearance externally  Ears: External canal no drainage  Nose: Normal external appearance and no drainage   Chest: Clear to auscultation, no wheezes, rales or rhonchi  Heart: Normal rate, regular rhythm, no murmurs, rubs, clicks or gallops  Skin: Normal color, no lesions externally  Abdomen: Not examined  Neuro: Not examined  Musculoskeletal: Gait antalgic    Recent Results (from the past 168 hour(s))   TYPE & SCREEN    Collection Time: 08/29/22 12:09 PM   Result Value Ref Range    Crossmatch Expiration 09/12/2022,2359     ABO/Rh(D) Tip Sages POSITIVE     Antibody screen NEG    CBC WITH AUTOMATED DIFF    Collection Time: 08/29/22 12:09 PM   Result Value Ref Range    WBC 6.6 3.6 - 11.0 K/uL    RBC 4.26 3.80 - 5.20 M/uL    HGB 12.9 11.5 - 16.0 g/dL    HCT 39.5 35.0 - 47.0 %    MCV 92.7 80.0 - 99.0 FL    MCH 30.3 26.0 - 34.0 PG    MCHC 32.7 30.0 - 36.5 g/dL    RDW 15.1 (H) 11.5 - 14.5 %    PLATELET 192 264 - 555 K/uL    MPV 12.1 8.9 - 12.9 FL    NRBC 0.0 0  WBC    ABSOLUTE NRBC 0.00 0.00 - 0.01 K/uL    NEUTROPHILS 71 32 - 75 %    LYMPHOCYTES 22 12 - 49 %    MONOCYTES 5 5 - 13 %    EOSINOPHILS 1 0 - 7 %    BASOPHILS 1 0 - 1 %    IMMATURE GRANULOCYTES 0 0.0 - 0.5 %    ABS. NEUTROPHILS 4.6 1.8 - 8.0 K/UL    ABS. LYMPHOCYTES 1.5 0.8 - 3.5 K/UL    ABS. MONOCYTES 0.4 0.0 - 1.0 K/UL    ABS. EOSINOPHILS 0.1 0.0 - 0.4 K/UL    ABS. BASOPHILS 0.1 0.0 - 0.1 K/UL    ABS. IMM.  GRANS. 0.0 0.00 - 0.04 K/UL    DF AUTOMATED     METABOLIC PANEL, COMPREHENSIVE    Collection Time: 08/29/22 12:09 PM   Result Value Ref Range    Sodium 139 136 - 145 mmol/L    Potassium 4.4 3.5 - 5.1 mmol/L    Chloride 108 97 - 108 mmol/L    CO2 27 21 - 32 mmol/L    Anion gap 4 (L) 5 - 15 mmol/L    Glucose 82 65 - 100 mg/dL    BUN 25 (H) 6 - 20 MG/DL    Creatinine 0.60 0.55 - 1.02 MG/DL    BUN/Creatinine ratio 42 (H) 12 - 20      GFR est AA >60 >60 ml/min/1.73m2    GFR est non-AA >60 >60 ml/min/1.73m2    Calcium 9.4 8.5 - 10.1 MG/DL    Bilirubin, total 0.6 0.2 - 1.0 MG/DL    ALT (SGPT) 101 (H) 12 - 78 U/L    AST (SGOT) 66 (H) 15 - 37 U/L Alk. phosphatase 62 45 - 117 U/L    Protein, total 6.7 6.4 - 8.2 g/dL    Albumin 3.9 3.5 - 5.0 g/dL    Globulin 2.8 2.0 - 4.0 g/dL    A-G Ratio 1.4 1.1 - 2.2     HEMOGLOBIN A1C WITH EAG    Collection Time: 08/29/22 12:09 PM   Result Value Ref Range    Hemoglobin A1c 5.1 4.0 - 5.6 %    Est. average glucose 100 mg/dL   CULTURE, MRSA    Collection Time: 08/29/22 12:09 PM    Specimen: Nares; Nasal   Result Value Ref Range    Special Requests: NO SPECIAL REQUESTS      Culture result: MRSA NOT PRESENT      Culture result:        Screening of patient nares for MRSA is for surveillance purposes and, if positive, to facilitate isolation considerations in high risk settings. It is not intended for automatic decolonization interventions per se as regimens are not sufficiently effective to warrant routine use.      PTT    Collection Time: 08/29/22 12:09 PM   Result Value Ref Range    aPTT 28.5 22.1 - 31.0 sec    aPTT, therapeutic range     58.0 - 77.0 SECS   PROTHROMBIN TIME + INR    Collection Time: 08/29/22 12:09 PM   Result Value Ref Range    INR 1.0 0.9 - 1.1      Prothrombin time 10.1 9.0 - 11.1 sec   C REACTIVE PROTEIN, QT    Collection Time: 08/29/22 12:09 PM   Result Value Ref Range    C-Reactive protein <0.29 0.00 - 0.60 mg/dL   SED RATE (ESR)    Collection Time: 08/29/22 12:09 PM   Result Value Ref Range    Sed rate, automated 5 0 - 30 mm/hr   TRANSFERRIN    Collection Time: 08/29/22 12:09 PM   Result Value Ref Range    Transferrin 221 192 - 364 mg/dL   URINALYSIS W/ REFLEX CULTURE    Collection Time: 08/29/22 12:09 PM    Specimen: Urine   Result Value Ref Range    Color YELLOW/STRAW      Appearance CLEAR CLEAR      Specific gravity 1.013 1.003 - 1.030      pH (UA) 5.5 5.0 - 8.0      Protein Negative NEG mg/dL    Glucose Negative NEG mg/dL    Ketone Negative NEG mg/dL    Bilirubin Negative NEG      Blood Negative NEG      Urobilinogen 0.2 0.2 - 1.0 EU/dL    Nitrites Negative NEG      Leukocyte Esterase MODERATE (A) NEG      UA:UC IF INDICATED URINE CULTURE ORDERED (A) CNI      WBC 10-20 0 - 4 /hpf    RBC 0-5 0 - 5 /hpf    Epithelial cells FEW FEW /lpf    Bacteria 3+ (A) NEG /hpf    Hyaline cast 0-2 0 - 2 /lpf   CULTURE, URINE    Collection Time: 08/29/22 12:09 PM    Specimen: Urine   Result Value Ref Range    Special Requests: NO SPECIAL REQUESTS  Reflexed from [id number]        [place] Count >100,000  COLONIES/mL        Culture result: ESCHERICHIA COLI (A)         Susceptibility    Escherichia coli - ELIF     Amikacin ($)  Susceptible ug/mL     Ampicillin ($)  Resistant ug/mL     Ampicillin/sulbactam ($)  Resistant ug/mL     Cefazolin ($)  Susceptible ug/mL     Cefepime ($$)  Susceptible ug/mL     Cefoxitin  Susceptible ug/mL     Ceftazidime ($)  Susceptible ug/mL     Ceftriaxone ($)  Susceptible ug/mL     Ciprofloxacin ($)  Susceptible ug/mL     Gentamicin ($)  Susceptible ug/mL     Levofloxacin ($)  Susceptible ug/mL     Meropenem ($$)  Susceptible ug/mL     Nitrofurantoin  Susceptible ug/mL     Piperacillin/Tazobac ($)  Susceptible ug/mL     Tobramycin ($)  Susceptible ug/mL     Trimeth/Sulfa  Resistant ug/mL   EKG, 12 LEAD, INITIAL    Collection Time: 08/29/22 12:27 PM   Result Value Ref Range    Ventricular Rate 63 BPM    Atrial Rate 63 BPM    P-R Interval 170 ms    QRS Duration 78 ms    Q-T Interval 382 ms    QTC Calculation (Bezet) 390 ms    Calculated P Axis 11 degrees    Calculated R Axis 29 degrees    Calculated T Axis 51 degrees    Diagnosis       Normal sinus rhythm  Normal ECG  When compared with ECG of 18-NOV-2020 13:32,  No significant change was found  Confirmed by Gumaro Gamez M.D., Wanda Rosales ([id number]) on 8/30/2022 11:21:38 AM           Assessment:     Failed right knee replacement  Pre-Operative Evaluation    Plan:     RTK Revision  MRSA negative  Urine culture positive- see separate note  Labs and EKG reviewed.        Signed By: Eleanor Carballo NP     September 1, 2022

## 2022-08-30 LAB
ATRIAL RATE: 63 BPM
BACTERIA SPEC CULT: NORMAL
BACTERIA SPEC CULT: NORMAL
CALCULATED P AXIS, ECG09: 11 DEGREES
CALCULATED R AXIS, ECG10: 29 DEGREES
CALCULATED T AXIS, ECG11: 51 DEGREES
DIAGNOSIS, 93000: NORMAL
P-R INTERVAL, ECG05: 170 MS
Q-T INTERVAL, ECG07: 382 MS
QRS DURATION, ECG06: 78 MS
QTC CALCULATION (BEZET), ECG08: 390 MS
SERVICE CMNT-IMP: NORMAL
TRANSFERRIN SERPL-MCNC: 221 MG/DL (ref 192–364)
VENTRICULAR RATE, ECG03: 63 BPM

## 2022-08-30 RX ORDER — GLUCAGON 1 MG
VIAL (EA) INJECTION
Qty: 1 EACH | Refills: 1 | Status: SHIPPED | OUTPATIENT
Start: 2022-08-30

## 2022-08-31 LAB
BACTERIA SPEC CULT: ABNORMAL
CC UR VC: ABNORMAL
SERVICE CMNT-IMP: ABNORMAL

## 2022-09-01 RX ORDER — CEFUROXIME AXETIL 250 MG/1
250 TABLET ORAL 2 TIMES DAILY
Qty: 10 TABLET | Refills: 0 | Status: SHIPPED | OUTPATIENT
Start: 2022-09-01 | End: 2022-09-01 | Stop reason: CLARIF

## 2022-09-01 RX ORDER — CEFUROXIME AXETIL 250 MG/1
250 TABLET ORAL 2 TIMES DAILY
Qty: 10 TABLET | Refills: 0 | Status: SHIPPED | OUTPATIENT
Start: 2022-09-01 | End: 2022-09-06

## 2022-09-01 NOTE — PERIOP NOTES
Patient notified of abnormal urinalysis result. Antibiotic treatment reviewed with patient and patient verbalized understanding.

## 2022-09-01 NOTE — PROGRESS NOTES
Notification of Positive Urine Culture and Treatment Ordered by Preadmission Testing Nurse Practitioner      Patient Name:  Ace Gore  MRN: [id number]  : 1955    Surgeon: Danny Pimentel  Date of surgery: 22  Procedure: Knee Revision    Allergies: Allergies   Allergen Reactions    Nsaids (Non-Steroidal Anti-Inflammatory Drug) Other (comments)     Gastric bypass       Urine culture result:     Culture result:   Date Value Ref Range Status   2022 MRSA NOT PRESENT   Final   2022    Final    Screening of patient nares for MRSA is for surveillance purposes and, if positive, to facilitate isolation considerations in high risk settings. It is not intended for automatic decolonization interventions per se as regimens are not sufficiently effective to warrant routine use.      2022 ESCHERICHIA COLI (A)   Final       Treatment ordered: Ceftin 250mg PO BID x 5 days    Date patient notified of results / treatment prescribed: 22    The patient was instructed to add medication and date they began treatment to their \"Prior to Admission Medication\" list given to them in [address], NP

## 2022-09-08 NOTE — PROGRESS NOTES
The patient was provided a virtual link to view the pre-operative Joint Replacement Class Video  A Patient Education Book specific to total hip or knee joint replacement surgery was given to the patient in [facility]. The content of the class was presented using an audio power point presentation specific for patients undergoing total hip and knee replacement surgery. Incentive spirometer and CHG bath kits were verbally reviewed. Day of surgery routine and expectations, hospital routine and expectations, nutrition, alcohol, nicotine, medications, infection control, pain management, DVT precautions and equipment, ice therapy, durable medical equipment, exercises, mobility expectations and precautions, home preparation and safety were reviewed in class video. My contact information was shared with the patient to provide further information as requested by the patient related to their upcoming surgery. Patient left voice confirmation that they viewed the joint class video. Questions answered during return phone call.

## 2022-09-09 ENCOUNTER — ANESTHESIA EVENT (OUTPATIENT)
Dept: SURGERY | Age: 67
End: 2022-09-09
Payer: MEDICARE

## 2022-09-11 NOTE — DISCHARGE INSTRUCTIONS
TOTAL KNEE DISCHARGE INSTRUCTIONS      Patient: Jase Pereira MRN: [id number]  SSN: [id number]              Please take the time to review the following instructions before you leave the hospital and use them as guidelines during your recovery from surgery. If you have any questions you may contact my office at [phone]  After business hours or during the weekend you can contact me through [address] or text / call at [phone] (cell phone) for emergency's. Please use the office number during regular business hours. SPECIAL INSTRUCTIONS :   1. Full extension at the knee is the most important aspect of your range of motion. Avoid placing a pillow or bump behind the knee. Rather, place the heel up on a bump or pillow and allow gravity to help straighten the knee. 2. You may weight bear as tolerated on the knee and during the day you should bend the knee as much as possible. 3. Drainage from the incision more than 4 days from surgery is concerning. Contact my office if there is any question [phone].   4. You may contact me directly through [employer] if there are specific questions or text / call using my cell number [phone]. DRESSING :     Post-op Dressings : This should be removed by physical therapy or you may remove this yourself 7 days after the date of your surgery. If there is no drainage, then a simple dressing may be used or no dressing at all. Other dressing options can be purchased over the counter at a local pharmacy or medical supply vendor. A porous adhesive dressing such as pictured above can be purchased online ([employer]) or at your local [facility]. You only need to keep the incision covered for 7 days after showers. A dressing may be used for longer if there are issues with clothing clinging to the incision. Showering/ Bathing: You may shower with the Post-op dressing in place.  This is left in place for 7 days following discharge from the hospital. If your incision is dry without drainage you may shower following your discharge home. After 7 days your dressing should be removed for showering. It is fine to have water run over the incision. Do not vigorously scrub your incision. Apply a clean, dry dressing after you have dried your incision. Do not take a bath or get into a swimming pool / [employer] until you follow up with Dr. Hank Marquez. Do not soak your incision under water. If there is continued drainage or you are concerned contact Dr Rosangela Palencia office prior to showering [phone] ext [phone] . Diet:  You may advance to your regular diet as tolerated. Increase your clear liquid intake for the next 2-3 days. Medication:      You will be given prescriptions for pain medication when you are discharged from the hospital. The side effects of these medications can be substantial and the narcotic medications are not mandatory. You may substitute these medications with Tylenol or Alleve / Motrin. Please use the medications as prescribed. Pain medications may cause constipation- Colace twice daily and Miralax one scoop daily while taking the narcotic medication should help prevent constipation. Please discuss with your local pharmacist regarding increasing this dosage if constipation persists. Other possible side effects of pain medication are dizziness, headache, nausea, vomiting, and urinary retention. Discontinue the pain medication if you develop itching, rash, shortness of breath, or difficulties swallowing. If these symptoms become severe or are not relieved by discontinuing the medication, you should seek immediate medical attention. Refills of pain medication are authorized during office hours only (8 AM- 5 PM  Monday thru Friday). Many of these medication will require you or a family member to pick-up a physical prescription at the office.    Medications other than antiinflammatories will not be called into the pharmacy after business hours. You may resume the medication(s) you were taking prior to your surgery. Narcotics may change the effects of some antidepressant medication(s). If you have any questions about possible interactions between your regular medications and the pain medication, you should ask the pharmacist or contact the prescribing physician. If you have constipation which is not improved by oral stool softeners then a Ducolax suppository should be purchased over the counter. Continue the blood thinner (Aspirin or Lovenox) for a total of 30 days following surgery. Follow up appointment:    Please call our office at [phone] for your follow up appointment. This should be scheduled 14 days following the date of surgery. You should also have a scheduled appointment for physical therapy following surgery. Physical Therapy / Nursing:    Physical Therapy following surgery will be arranged as an outpatient or at home. They have specific instructions for rehab and wound care. It is fine to have physical therapy remove your dressing at 7 days following surgery. Returning to work:    Normal return to work is 6-12 weeks following surgery. Depending on your progression following surgery and specific job duties you may take longer for a full return to work. DRIVING    You should not return to driving until you are off all opioid pain medications and able to safely and quickly apply the brakes. This is normally 2-6 weeks for left sided surgery and 2-8 weeks for right sided surgery. Important Signs and Symptoms:    If any of the following signs or symptoms occur, you should contact Dr. Anmol East office. Please be advised if a problem arises which you feel requires immediate medical attention or you are unable to contact Dr. Anmol East office you should seek immediate medical attention at the ER or other health care facility you have access to.     A sudden increase in swelling and/or redness or warmth at the area your surgery was performed which isnt relieved by rest, ice, and elevation. Oral temperature greater than 101 degrees for 12 hours or more which isnt relieved by an increase in fluid intake and taking 2 Tylenol every 4-6 hours. Excessive drainage from your incisions, or drainage which hasnt stopped by 72 hours after your surgery. Fever, chills, shortness of breath, chest pain, nausea, vomiting or other signs and symptoms which are of concern to you. YOUR TOTAL JOINT REPLACEMENT  FREQUENTLY ASKED QUESTIONS  What should I take for pain? You will be discharged with four medications for pain (Oxycodone, Tramadol, Ibuprofen and Tylenol). These may vary slightly depending on what you were taking in the hospital.   1st Line - Tylenol Arthritis Strength - 325-650 mg every 4 hours (scheduled for the 1st 24 hours)  2nd Line -  Ibuprofen 400 (2 tabs) - 800 (4 tabs) mg every 8 hours  (scheduled for the 1st 24 hours)  3rd Line - Oxycodone 5 mg (1-2 tablets every 4-6 hrs)  4th Line - Tramadol 50 mg (1-2 tablets every 4-6 hours) - take these between Oxycodone doses if your pain is not alleviated. When should I call for advice regarding my pain? If your pain is still uncontrolled after being on the regimen above for at least 12 hours, please call the office [phone] or text / call my cell after hours [phone]. Can I get refills? Opioid refills are provided for the first 2-6 weeks following surgery. Use Tylenol 500 mg along with Aleve 220mg twice daily or Motrin 200-800mg every 4-6 hours during the daytime hours after two weeks. After two weeks, I suggest the opioid pain medications be used only 1 hour prior to your physical therapy appointment and 1 hour before sleeping at night. Use Tylenol and Ibuprofen at other times during the day. Keep in mind that you will need to discontinue opioids before you resume driving.    Is swelling normal?  Almost everyone has some degree of swelling following surgery. Following hip and knee replacement surgery, swelling can be normal below the incision for the first few weeks. This swelling peaks around 5-7 days after surgery. It is not unusual to have some bruising about the back of the thigh, calf, ankle, and foot. What should I do for the swelling? Keep the limb elevated above the level of your heart - 'Toes above Nose'. Apply compression socks (knee high for total knees and up to the mid-thigh for total hips). Use the ice packs that you are discharged home with several times a day for the first several weeks. How long should I remain on blood thinners following surgery? 30 days  Which blood thinners will I be on? Can I take them with Tylenol? Aspirin 81 mg twice daily - these should be taken with meals and can be used with Tylenol. In certain instances, you may be sent home on Lovenox for 30 days. For short periods of time (30 days), aspirin and anti-inflammatories (i.e. Aleve, Motrin / Advil / ibuprofen, diclofenac, etc. can be taken together). When can I drive? Once you have stopped using regular narcotic pain medications (Oxycodone, Percocet, Lortab, Norco etc.) and can safely apply the brakes without hesitation, (emergency braking). When can I shower? You may shower immediately if your Optifoam bandage is dry and without discharge. The Optifoam dressing should be removed 7 days following surgery, after which you may continue to shower. No submersion of the incision, bathing or swimming for 14 days following surgery or until cleared by Dr Zenon Quinn. Can I remove this dressing? Yes, this is removed just like removing a band aid. If you are concerned, this can be removed by your therapist.   What do I do with the dressing when I shower? The Optifoam dressing is waterproof and you may shower with it.    The incision is sealed with Dermabond, a biologic skin glue, which also serves as a watertight seal. If your incision is draining, it is no longer considered to be watertight - you should contact our office prior to showering if you experience any drainage. Which dressing should I purchase after I remove my Optifoam?  An occlusive dressing which covers your entire incision. This does not have to be waterproof, but will need to be removed when you shower and then replaced. (Example Only)  How active should I be following surgery? Progress activities in moderation and at your own pace. Walking room to room in your house is encouraged. Walk each day and set progressive goals with small increments (1st week - ?block of walking, 2nd week - 1 block, 3rd week - 2 blocks, etc.)  Will I need help at home? You will likely need a caretaker who should be available for the first week following surgery. It is fine for family members to work during the day, as long as they are available by phone. Planning ahead makes coming home from the hospital a much easier transition. How long will my surgery take? On average, total joint replacement takes approximately 1-2 hour. The entire process, including pre-op and post-op care can last as long as 4- 5 hours before you are transferred to your room. stroke, pulmonary embolism (a clot going from the legs to the lungs), and even death with surgery. Will I be given antibiotics? Will I need antibiotics at discharge? Antibiotics will be given to you both before and after your procedure. To further minimize the risk of infection, we have streamlined the surgical procedure to take less time in the operating room. You do not require antibiotics following surgery.       Please do not hesitate to contact me through [email or web address] or by text / call me at [phone] (cell phone) for questions following surgery - MD Quan Betancourt MD  Cell [phone]  Wannetta Najjar, PA-C  Cell [phone]  Medical Assistants: [facility] [phone]

## 2022-09-12 ENCOUNTER — ANESTHESIA (OUTPATIENT)
Dept: SURGERY | Age: 67
End: 2022-09-12
Payer: MEDICARE

## 2022-09-12 ENCOUNTER — APPOINTMENT (OUTPATIENT)
Dept: GENERAL RADIOLOGY | Age: 67
End: 2022-09-12
Attending: ORTHOPAEDIC SURGERY
Payer: MEDICARE

## 2022-09-12 ENCOUNTER — HOSPITAL ENCOUNTER (OUTPATIENT)
Age: 67
Setting detail: OBSERVATION
Discharge: HOME | End: 2022-09-15
Attending: ORTHOPAEDIC SURGERY | Admitting: ORTHOPAEDIC SURGERY
Payer: MEDICARE

## 2022-09-12 DIAGNOSIS — T84.012S FAILED TOTAL KNEE, RIGHT, SEQUELA: Primary | ICD-10-CM

## 2022-09-12 PROBLEM — T84.012D FAILED TOTAL KNEE, RIGHT, SUBSEQUENT ENCOUNTER: Status: ACTIVE | Noted: 2022-09-12

## 2022-09-12 LAB
APPEARANCE SNV: ABNORMAL
COLOR SNV: ABNORMAL
GLUCOSE BLD STRIP.AUTO-MCNC: 93 MG/DL (ref 65–117)
LYMPHOCYTES NFR SNV MANUAL: 57 % (ref 0–15)
MONOCYTES NFR SNV MANUAL: 5 % (ref 0–65)
NEUTROPHILS NFR SNV MANUAL: 38 % (ref 0–20)
RBC # SNV: >100 /CU MM
SERVICE CMNT-IMP: NORMAL
SPECIMEN SOURCE FLD: ABNORMAL
WBC # SNV: 114 /CU MM (ref 0–150)

## 2022-09-12 PROCEDURE — 64447 NJX AA&/STRD FEMORAL NRV IMG: CPT

## 2022-09-12 PROCEDURE — 89050 BODY FLUID CELL COUNT: CPT

## 2022-09-12 PROCEDURE — 64445 NJX AA&/STRD SCIATIC NRV IMG: CPT

## 2022-09-12 PROCEDURE — 74011250636 HC RX REV CODE- 250/636: Performed by: STUDENT IN AN ORGANIZED HEALTH CARE EDUCATION/TRAINING PROGRAM

## 2022-09-12 PROCEDURE — 77030002933 HC SUT MCRYL J&J -A: Performed by: ORTHOPAEDIC SURGERY

## 2022-09-12 PROCEDURE — 77030040922 HC BLNKT HYPOTHRM STRY -A

## 2022-09-12 PROCEDURE — 77030026438 HC STYL ET INTUB CARD -A: Performed by: NURSE ANESTHETIST, CERTIFIED REGISTERED

## 2022-09-12 PROCEDURE — 74011000250 HC RX REV CODE- 250: Performed by: NURSE ANESTHETIST, CERTIFIED REGISTERED

## 2022-09-12 PROCEDURE — L1830 KO IMMOB CANVAS LONG PRE OTS: HCPCS | Performed by: ORTHOPAEDIC SURGERY

## 2022-09-12 PROCEDURE — 77030006773 HC BLD SAW OSC BRSM -A: Performed by: ORTHOPAEDIC SURGERY

## 2022-09-12 PROCEDURE — C1776 JOINT DEVICE (IMPLANTABLE): HCPCS | Performed by: ORTHOPAEDIC SURGERY

## 2022-09-12 PROCEDURE — 74011250636 HC RX REV CODE- 250/636: Performed by: NURSE ANESTHETIST, CERTIFIED REGISTERED

## 2022-09-12 PROCEDURE — 77030010785: Performed by: ORTHOPAEDIC SURGERY

## 2022-09-12 PROCEDURE — 2709999900 HC NON-CHARGEABLE SUPPLY: Performed by: ORTHOPAEDIC SURGERY

## 2022-09-12 PROCEDURE — 74011250637 HC RX REV CODE- 250/637: Performed by: PHYSICIAN ASSISTANT

## 2022-09-12 PROCEDURE — 74011250636 HC RX REV CODE- 250/636: Performed by: PHYSICIAN ASSISTANT

## 2022-09-12 PROCEDURE — G0378 HOSPITAL OBSERVATION PER HR: HCPCS

## 2022-09-12 PROCEDURE — 77030040393 HC DRSG OPTIFOAM GENT MDII -B: Performed by: ORTHOPAEDIC SURGERY

## 2022-09-12 PROCEDURE — 77030018723 HC ELCTRD BLD COVD -A: Performed by: ORTHOPAEDIC SURGERY

## 2022-09-12 PROCEDURE — 74011000250 HC RX REV CODE- 250: Performed by: PHYSICIAN ASSISTANT

## 2022-09-12 PROCEDURE — 77030003601 HC NDL NRV BLK BBMI -A

## 2022-09-12 PROCEDURE — C1713 ANCHOR/SCREW BN/BN,TIS/BN: HCPCS | Performed by: ORTHOPAEDIC SURGERY

## 2022-09-12 PROCEDURE — 87075 CULTR BACTERIA EXCEPT BLOOD: CPT

## 2022-09-12 PROCEDURE — 87077 CULTURE AEROBIC IDENTIFY: CPT

## 2022-09-12 PROCEDURE — 87205 SMEAR GRAM STAIN: CPT

## 2022-09-12 PROCEDURE — 76030000021 HC AMB SURG 2 TO 2.5 HR INTENSV-TIER 1: Performed by: ORTHOPAEDIC SURGERY

## 2022-09-12 PROCEDURE — 74011250637 HC RX REV CODE- 250/637: Performed by: ANESTHESIOLOGY

## 2022-09-12 PROCEDURE — 76210000035 HC AMBSU PH I REC 1 TO 1.5 HR: Performed by: ORTHOPAEDIC SURGERY

## 2022-09-12 PROCEDURE — 77030035236 HC SUT PDS STRATFX BARB J&J -B: Performed by: ORTHOPAEDIC SURGERY

## 2022-09-12 PROCEDURE — 77030011467 HC KT BN PREP STRY -C: Performed by: ORTHOPAEDIC SURGERY

## 2022-09-12 PROCEDURE — 76060000064 HC AMB SURG ANES 2 TO 2.5 HR: Performed by: ORTHOPAEDIC SURGERY

## 2022-09-12 PROCEDURE — 77030008684 HC TU ET CUF COVD -B: Performed by: NURSE ANESTHETIST, CERTIFIED REGISTERED

## 2022-09-12 PROCEDURE — 74011250637 HC RX REV CODE- 250/637: Performed by: STUDENT IN AN ORGANIZED HEALTH CARE EDUCATION/TRAINING PROGRAM

## 2022-09-12 PROCEDURE — 77030031139 HC SUT VCRL2 J&J -A: Performed by: ORTHOPAEDIC SURGERY

## 2022-09-12 PROCEDURE — 82962 GLUCOSE BLOOD TEST: CPT

## 2022-09-12 PROCEDURE — 74011250636 HC RX REV CODE- 250/636: Performed by: ANESTHESIOLOGY

## 2022-09-12 PROCEDURE — 73560 X-RAY EXAM OF KNEE 1 OR 2: CPT

## 2022-09-12 PROCEDURE — 77030006835 HC BLD SAW SAG STRY -B: Performed by: ORTHOPAEDIC SURGERY

## 2022-09-12 PROCEDURE — 77030040361 HC SLV COMPR DVT MDII -B

## 2022-09-12 PROCEDURE — 74011000250 HC RX REV CODE- 250: Performed by: ORTHOPAEDIC SURGERY

## 2022-09-12 DEVICE — CEMENTED STEM
Type: IMPLANTABLE DEVICE | Site: KNEE | Status: FUNCTIONAL
Brand: TRIATHLON

## 2022-09-12 DEVICE — SIMPLEX® HV WITH GENTAMICIN IS A FAST-SETTING ACRYLIC RESIN WITH ADDITION OF GENTAMICIN SULFATE FOR USE IN BONE SURGERY. MIXING THE TWO SEPARATE STERILE COMPONENTS PRODUCES A DUCTILE BONE CEMENT WHICH, AFTER HARDENING, FIXES THE IMPLANT AND TRANSFERS STRESSES PRODUCED DURING MOVEMENT EVENLY TO THE BONE. SIMPLEX® HV WITH GENTAMICINN CEMENT POWDER ALSO CONTAINS INSOLUBLE ZIRCONIUM DIOXIDE AS AN X-RAY CONTRAST MEDIUM. SIMPLEX® HV WITH GENTAMICIN DOES NOT EMIT A SIGNAL AND DOES NOT POSE A SAFETY RISK IN A MAGNETIC RESONANCE ENVIRONMENT.
Type: IMPLANTABLE DEVICE | Site: KNEE | Status: FUNCTIONAL
Brand: SIMPLEX HV WITH GENTAMICIN

## 2022-09-12 DEVICE — TIBIAL BEARING INSERT - PS
Type: IMPLANTABLE DEVICE | Site: KNEE | Status: FUNCTIONAL
Brand: TRIATHLON

## 2022-09-12 DEVICE — BONE PREPARATION KIT
Type: IMPLANTABLE DEVICE | Site: KNEE | Status: FUNCTIONAL
Brand: BIOPREP

## 2022-09-12 DEVICE — TOTAL STABILIZER FEMORAL COMPONENT
Type: IMPLANTABLE DEVICE | Site: KNEE | Status: FUNCTIONAL
Brand: TRIATHLON

## 2022-09-12 RX ORDER — SCOLOPAMINE TRANSDERMAL SYSTEM 1 MG/1
1 PATCH, EXTENDED RELEASE TRANSDERMAL
Status: COMPLETED | OUTPATIENT
Start: 2022-09-12 | End: 2022-09-15

## 2022-09-12 RX ORDER — SODIUM CHLORIDE 0.9 % (FLUSH) 0.9 %
5-40 SYRINGE (ML) INJECTION AS NEEDED
Status: DISCONTINUED | OUTPATIENT
Start: 2022-09-12 | End: 2022-09-15 | Stop reason: HOSPADM

## 2022-09-12 RX ORDER — ACETAMINOPHEN 325 MG/1
975 TABLET ORAL ONCE
Status: COMPLETED | OUTPATIENT
Start: 2022-09-12 | End: 2022-09-12

## 2022-09-12 RX ORDER — BUSPIRONE HYDROCHLORIDE 5 MG/1
7.5 TABLET ORAL
Status: DISCONTINUED | OUTPATIENT
Start: 2022-09-12 | End: 2022-09-15 | Stop reason: HOSPADM

## 2022-09-12 RX ORDER — ACETAMINOPHEN 500 MG
975 TABLET ORAL
Qty: 60 TABLET | Refills: 1 | Status: SHIPPED | OUTPATIENT
Start: 2022-09-12

## 2022-09-12 RX ORDER — SODIUM CHLORIDE, SODIUM LACTATE, POTASSIUM CHLORIDE, CALCIUM CHLORIDE 600; 310; 30; 20 MG/100ML; MG/100ML; MG/100ML; MG/100ML
125 INJECTION, SOLUTION INTRAVENOUS CONTINUOUS
Status: DISCONTINUED | OUTPATIENT
Start: 2022-09-12 | End: 2022-09-12 | Stop reason: HOSPADM

## 2022-09-12 RX ORDER — OXYCODONE HYDROCHLORIDE 5 MG/1
10 TABLET ORAL
Status: COMPLETED | OUTPATIENT
Start: 2022-09-12 | End: 2022-09-12

## 2022-09-12 RX ORDER — TRAMADOL HYDROCHLORIDE 50 MG/1
50 TABLET ORAL
Status: DISCONTINUED | OUTPATIENT
Start: 2022-09-12 | End: 2022-09-13

## 2022-09-12 RX ORDER — THERA TABS 400 MCG
1 TAB ORAL DAILY
Status: DISCONTINUED | OUTPATIENT
Start: 2022-09-13 | End: 2022-09-15 | Stop reason: HOSPADM

## 2022-09-12 RX ORDER — ACARBOSE 25 MG/1
125 TABLET ORAL
Status: DISCONTINUED | OUTPATIENT
Start: 2022-09-12 | End: 2022-09-13

## 2022-09-12 RX ORDER — SUCCINYLCHOLINE CHLORIDE 20 MG/ML
INJECTION INTRAMUSCULAR; INTRAVENOUS AS NEEDED
Status: DISCONTINUED | OUTPATIENT
Start: 2022-09-12 | End: 2022-09-12 | Stop reason: HOSPADM

## 2022-09-12 RX ORDER — ASPIRIN 81 MG/1
81 TABLET ORAL 2 TIMES DAILY
Qty: 60 TABLET | Refills: 0 | Status: SHIPPED | OUTPATIENT
Start: 2022-09-12

## 2022-09-12 RX ORDER — PHENYLEPHRINE HCL IN 0.9% NACL 0.4MG/10ML
SYRINGE (ML) INTRAVENOUS AS NEEDED
Status: DISCONTINUED | OUTPATIENT
Start: 2022-09-12 | End: 2022-09-12 | Stop reason: HOSPADM

## 2022-09-12 RX ORDER — FAMOTIDINE 10 MG/ML
INJECTION INTRAVENOUS AS NEEDED
Status: DISCONTINUED | OUTPATIENT
Start: 2022-09-12 | End: 2022-09-12 | Stop reason: HOSPADM

## 2022-09-12 RX ORDER — FENTANYL CITRATE 50 UG/ML
INJECTION, SOLUTION INTRAMUSCULAR; INTRAVENOUS AS NEEDED
Status: DISCONTINUED | OUTPATIENT
Start: 2022-09-12 | End: 2022-09-12 | Stop reason: HOSPADM

## 2022-09-12 RX ORDER — PREGABALIN 75 MG/1
75 CAPSULE ORAL
Qty: 30 CAPSULE | Refills: 0 | Status: SHIPPED | OUTPATIENT
Start: 2022-09-12

## 2022-09-12 RX ORDER — HYDROMORPHONE HYDROCHLORIDE 1 MG/ML
0.5 INJECTION, SOLUTION INTRAMUSCULAR; INTRAVENOUS; SUBCUTANEOUS
Status: ACTIVE | OUTPATIENT
Start: 2022-09-12 | End: 2022-09-13

## 2022-09-12 RX ORDER — ERGOCALCIFEROL 1.25 MG/1
50000 CAPSULE ORAL
Status: DISCONTINUED | OUTPATIENT
Start: 2022-09-18 | End: 2022-09-15 | Stop reason: HOSPADM

## 2022-09-12 RX ORDER — TRAMADOL HYDROCHLORIDE 50 MG/1
100 TABLET ORAL
Status: DISCONTINUED | OUTPATIENT
Start: 2022-09-12 | End: 2022-09-13

## 2022-09-12 RX ORDER — ASPIRIN 81 MG/1
81 TABLET ORAL 2 TIMES DAILY
Status: DISCONTINUED | OUTPATIENT
Start: 2022-09-12 | End: 2022-09-15 | Stop reason: HOSPADM

## 2022-09-12 RX ORDER — TRAMADOL HYDROCHLORIDE 50 MG/1
50 TABLET ORAL
Qty: 28 TABLET | Refills: 0 | Status: SHIPPED | OUTPATIENT
Start: 2022-09-12 | End: 2022-09-19

## 2022-09-12 RX ORDER — TRANEXAMIC ACID 100 MG/ML
INJECTION, SOLUTION INTRAVENOUS AS NEEDED
Status: DISCONTINUED | OUTPATIENT
Start: 2022-09-12 | End: 2022-09-12 | Stop reason: HOSPADM

## 2022-09-12 RX ORDER — NALOXONE HYDROCHLORIDE 0.4 MG/ML
0.4 INJECTION, SOLUTION INTRAMUSCULAR; INTRAVENOUS; SUBCUTANEOUS AS NEEDED
Status: DISCONTINUED | OUTPATIENT
Start: 2022-09-12 | End: 2022-09-15 | Stop reason: HOSPADM

## 2022-09-12 RX ORDER — AMOXICILLIN 250 MG
1 CAPSULE ORAL 2 TIMES DAILY
Status: DISCONTINUED | OUTPATIENT
Start: 2022-09-12 | End: 2022-09-15 | Stop reason: HOSPADM

## 2022-09-12 RX ORDER — SODIUM CHLORIDE 0.9 % (FLUSH) 0.9 %
5-40 SYRINGE (ML) INJECTION EVERY 8 HOURS
Status: DISCONTINUED | OUTPATIENT
Start: 2022-09-12 | End: 2022-09-15 | Stop reason: HOSPADM

## 2022-09-12 RX ORDER — TRAZODONE HYDROCHLORIDE 100 MG/1
100 TABLET ORAL
Status: DISCONTINUED | OUTPATIENT
Start: 2022-09-12 | End: 2022-09-15 | Stop reason: HOSPADM

## 2022-09-12 RX ORDER — FENTANYL CITRATE 50 UG/ML
25 INJECTION, SOLUTION INTRAMUSCULAR; INTRAVENOUS
Status: DISCONTINUED | OUTPATIENT
Start: 2022-09-12 | End: 2022-09-12 | Stop reason: HOSPADM

## 2022-09-12 RX ORDER — FLUTICASONE PROPIONATE 50 MCG
2 SPRAY, SUSPENSION (ML) NASAL DAILY
Status: DISCONTINUED | OUTPATIENT
Start: 2022-09-13 | End: 2022-09-15 | Stop reason: HOSPADM

## 2022-09-12 RX ORDER — TRANEXAMIC ACID 100 MG/ML
INJECTION, SOLUTION INTRAVENOUS AS NEEDED
Status: DISCONTINUED | OUTPATIENT
Start: 2022-09-12 | End: 2022-09-12

## 2022-09-12 RX ORDER — POLYETHYLENE GLYCOL 3350 17 G/17G
17 POWDER, FOR SOLUTION ORAL DAILY
Status: DISCONTINUED | OUTPATIENT
Start: 2022-09-13 | End: 2022-09-15 | Stop reason: HOSPADM

## 2022-09-12 RX ORDER — FACIAL-BODY WIPES
10 EACH TOPICAL DAILY PRN
Status: DISCONTINUED | OUTPATIENT
Start: 2022-09-14 | End: 2022-09-15 | Stop reason: HOSPADM

## 2022-09-12 RX ORDER — LANOLIN ALCOHOL/MO/W.PET/CERES
400 CREAM (GRAM) TOPICAL
Status: DISCONTINUED | OUTPATIENT
Start: 2022-09-12 | End: 2022-09-15 | Stop reason: HOSPADM

## 2022-09-12 RX ORDER — ONDANSETRON 2 MG/ML
4 INJECTION INTRAMUSCULAR; INTRAVENOUS AS NEEDED
Status: DISCONTINUED | OUTPATIENT
Start: 2022-09-12 | End: 2022-09-12 | Stop reason: HOSPADM

## 2022-09-12 RX ORDER — ESCITALOPRAM OXALATE 10 MG/1
20 TABLET ORAL
Status: DISCONTINUED | OUTPATIENT
Start: 2022-09-12 | End: 2022-09-15 | Stop reason: HOSPADM

## 2022-09-12 RX ORDER — ACETAMINOPHEN 325 MG/1
650 TABLET ORAL EVERY 6 HOURS
Status: DISCONTINUED | OUTPATIENT
Start: 2022-09-12 | End: 2022-09-15 | Stop reason: HOSPADM

## 2022-09-12 RX ORDER — LIDOCAINE HYDROCHLORIDE 10 MG/ML
0.1 INJECTION, SOLUTION EPIDURAL; INFILTRATION; INTRACAUDAL; PERINEURAL AS NEEDED
Status: DISCONTINUED | OUTPATIENT
Start: 2022-09-12 | End: 2022-09-12 | Stop reason: HOSPADM

## 2022-09-12 RX ORDER — ONDANSETRON 8 MG/1
4 TABLET, ORALLY DISINTEGRATING ORAL
Qty: 30 TABLET | Refills: 0 | Status: SHIPPED | OUTPATIENT
Start: 2022-09-12

## 2022-09-12 RX ORDER — IBUPROFEN 800 MG/1
800 TABLET ORAL
Qty: 50 TABLET | Refills: 2 | Status: SHIPPED | OUTPATIENT
Start: 2022-09-12

## 2022-09-12 RX ORDER — LANOLIN ALCOHOL/MO/W.PET/CERES
324 CREAM (GRAM) TOPICAL 2 TIMES DAILY WITH MEALS
Status: DISCONTINUED | OUTPATIENT
Start: 2022-09-12 | End: 2022-09-15 | Stop reason: HOSPADM

## 2022-09-12 RX ORDER — SODIUM CHLORIDE 9 MG/ML
125 INJECTION, SOLUTION INTRAVENOUS CONTINUOUS
Status: DISPENSED | OUTPATIENT
Start: 2022-09-12 | End: 2022-09-13

## 2022-09-12 RX ORDER — PROPOFOL 10 MG/ML
INJECTION, EMULSION INTRAVENOUS AS NEEDED
Status: DISCONTINUED | OUTPATIENT
Start: 2022-09-12 | End: 2022-09-12 | Stop reason: HOSPADM

## 2022-09-12 RX ORDER — OXYCODONE HYDROCHLORIDE 5 MG/1
5 TABLET ORAL
Qty: 42 TABLET | Refills: 0 | Status: SHIPPED | OUTPATIENT
Start: 2022-09-12 | End: 2022-09-19

## 2022-09-12 RX ORDER — LIDOCAINE HYDROCHLORIDE 20 MG/ML
INJECTION, SOLUTION EPIDURAL; INFILTRATION; INTRACAUDAL; PERINEURAL AS NEEDED
Status: DISCONTINUED | OUTPATIENT
Start: 2022-09-12 | End: 2022-09-12 | Stop reason: HOSPADM

## 2022-09-12 RX ORDER — ONDANSETRON 2 MG/ML
INJECTION INTRAMUSCULAR; INTRAVENOUS AS NEEDED
Status: DISCONTINUED | OUTPATIENT
Start: 2022-09-12 | End: 2022-09-12 | Stop reason: HOSPADM

## 2022-09-12 RX ORDER — ROCURONIUM BROMIDE 10 MG/ML
INJECTION, SOLUTION INTRAVENOUS AS NEEDED
Status: DISCONTINUED | OUTPATIENT
Start: 2022-09-12 | End: 2022-09-12 | Stop reason: HOSPADM

## 2022-09-12 RX ORDER — PREGABALIN 75 MG/1
75 CAPSULE ORAL ONCE
Status: COMPLETED | OUTPATIENT
Start: 2022-09-12 | End: 2022-09-12

## 2022-09-12 RX ORDER — ONDANSETRON 2 MG/ML
4 INJECTION INTRAMUSCULAR; INTRAVENOUS
Status: DISPENSED | OUTPATIENT
Start: 2022-09-12 | End: 2022-09-13

## 2022-09-12 RX ORDER — OXYCODONE HCL 10 MG/1
10 TABLET, FILM COATED, EXTENDED RELEASE ORAL ONCE
Status: COMPLETED | OUTPATIENT
Start: 2022-09-12 | End: 2022-09-12

## 2022-09-12 RX ORDER — HYDROMORPHONE HYDROCHLORIDE 1 MG/ML
.5-1 INJECTION, SOLUTION INTRAMUSCULAR; INTRAVENOUS; SUBCUTANEOUS
Status: DISCONTINUED | OUTPATIENT
Start: 2022-09-12 | End: 2022-09-12 | Stop reason: HOSPADM

## 2022-09-12 RX ORDER — MIDAZOLAM HYDROCHLORIDE 1 MG/ML
INJECTION, SOLUTION INTRAMUSCULAR; INTRAVENOUS AS NEEDED
Status: DISCONTINUED | OUTPATIENT
Start: 2022-09-12 | End: 2022-09-12 | Stop reason: HOSPADM

## 2022-09-12 RX ORDER — ERGOCALCIFEROL 1.25 MG/1
50000 CAPSULE ORAL DAILY
Status: DISCONTINUED | OUTPATIENT
Start: 2022-09-13 | End: 2022-09-12

## 2022-09-12 RX ORDER — POVIDONE-IODINE 10 %
SOLUTION, NON-ORAL TOPICAL AS NEEDED
Status: DISCONTINUED | OUTPATIENT
Start: 2022-09-12 | End: 2022-09-12 | Stop reason: HOSPADM

## 2022-09-12 RX ORDER — DIPHENHYDRAMINE HYDROCHLORIDE 50 MG/ML
12.5 INJECTION, SOLUTION INTRAMUSCULAR; INTRAVENOUS
Status: ACTIVE | OUTPATIENT
Start: 2022-09-12 | End: 2022-09-13

## 2022-09-12 RX ORDER — FAMOTIDINE 20 MG/1
20 TABLET, FILM COATED ORAL 2 TIMES DAILY
Status: DISCONTINUED | OUTPATIENT
Start: 2022-09-12 | End: 2022-09-15 | Stop reason: HOSPADM

## 2022-09-12 RX ORDER — DEXAMETHASONE SODIUM PHOSPHATE 4 MG/ML
INJECTION, SOLUTION INTRA-ARTICULAR; INTRALESIONAL; INTRAMUSCULAR; INTRAVENOUS; SOFT TISSUE AS NEEDED
Status: DISCONTINUED | OUTPATIENT
Start: 2022-09-12 | End: 2022-09-12 | Stop reason: HOSPADM

## 2022-09-12 RX ORDER — EPHEDRINE SULFATE/0.9% NACL/PF 50 MG/5 ML
SYRINGE (ML) INTRAVENOUS AS NEEDED
Status: DISCONTINUED | OUTPATIENT
Start: 2022-09-12 | End: 2022-09-12 | Stop reason: HOSPADM

## 2022-09-12 RX ORDER — ACARBOSE 50 MG/1
25 TABLET ORAL
Status: DISCONTINUED | OUTPATIENT
Start: 2022-09-12 | End: 2022-09-12

## 2022-09-12 RX ORDER — ROPIVACAINE HYDROCHLORIDE 2 MG/ML
INJECTION, SOLUTION EPIDURAL; INFILTRATION; PERINEURAL AS NEEDED
Status: DISCONTINUED | OUTPATIENT
Start: 2022-09-12 | End: 2022-09-12 | Stop reason: HOSPADM

## 2022-09-12 RX ADMIN — Medication 10 MG: at 14:07

## 2022-09-12 RX ADMIN — Medication 10 ML: at 19:00

## 2022-09-12 RX ADMIN — ASPIRIN 81 MG: 81 TABLET, COATED ORAL at 18:59

## 2022-09-12 RX ADMIN — ACETAMINOPHEN 650 MG: 325 TABLET, FILM COATED ORAL at 18:59

## 2022-09-12 RX ADMIN — Medication 100 MCG: at 13:57

## 2022-09-12 RX ADMIN — TRANEXAMIC ACID 1 G: 100 INJECTION, SOLUTION INTRAVENOUS at 15:12

## 2022-09-12 RX ADMIN — FENTANYL CITRATE 50 MCG: 50 INJECTION, SOLUTION INTRAMUSCULAR; INTRAVENOUS at 13:23

## 2022-09-12 RX ADMIN — MIDAZOLAM HYDROCHLORIDE 2 MG: 1 INJECTION, SOLUTION INTRAMUSCULAR; INTRAVENOUS at 13:21

## 2022-09-12 RX ADMIN — Medication 10 ML: at 22:39

## 2022-09-12 RX ADMIN — CEFAZOLIN SODIUM 2 G: 1 POWDER, FOR SOLUTION INTRAMUSCULAR; INTRAVENOUS at 13:46

## 2022-09-12 RX ADMIN — ONDANSETRON HYDROCHLORIDE 4 MG: 2 SOLUTION INTRAMUSCULAR; INTRAVENOUS at 16:08

## 2022-09-12 RX ADMIN — ROPIVACAINE HYDROCHLORIDE 20 ML: 2 INJECTION, SOLUTION EPIDURAL; INFILTRATION at 13:31

## 2022-09-12 RX ADMIN — SODIUM CHLORIDE, POTASSIUM CHLORIDE, SODIUM LACTATE AND CALCIUM CHLORIDE: 600; 310; 30; 20 INJECTION, SOLUTION INTRAVENOUS at 15:31

## 2022-09-12 RX ADMIN — SODIUM CHLORIDE, POTASSIUM CHLORIDE, SODIUM LACTATE AND CALCIUM CHLORIDE 125 ML/HR: 600; 310; 30; 20 INJECTION, SOLUTION INTRAVENOUS at 13:07

## 2022-09-12 RX ADMIN — DEXAMETHASONE SODIUM PHOSPHATE 8 MG: 4 INJECTION, SOLUTION INTRAMUSCULAR; INTRAVENOUS at 14:00

## 2022-09-12 RX ADMIN — PROPOFOL 50 MCG/KG/MIN: 10 INJECTION, EMULSION INTRAVENOUS at 13:44

## 2022-09-12 RX ADMIN — Medication 100 MCG: at 15:02

## 2022-09-12 RX ADMIN — Medication 10 MG: at 13:57

## 2022-09-12 RX ADMIN — CEFAZOLIN 2 G: 1 INJECTION, POWDER, FOR SOLUTION INTRAMUSCULAR; INTRAVENOUS at 22:39

## 2022-09-12 RX ADMIN — TRANEXAMIC ACID 1 G: 100 INJECTION, SOLUTION INTRAVENOUS at 13:50

## 2022-09-12 RX ADMIN — PROPOFOL 120 MG: 10 INJECTION, EMULSION INTRAVENOUS at 13:41

## 2022-09-12 RX ADMIN — ONDANSETRON HYDROCHLORIDE 4 MG: 2 SOLUTION INTRAMUSCULAR; INTRAVENOUS at 14:00

## 2022-09-12 RX ADMIN — SODIUM CHLORIDE 125 ML/HR: 9 INJECTION, SOLUTION INTRAVENOUS at 17:49

## 2022-09-12 RX ADMIN — TRAMADOL HYDROCHLORIDE 100 MG: 50 TABLET ORAL at 20:11

## 2022-09-12 RX ADMIN — FENTANYL CITRATE 50 MCG: 50 INJECTION, SOLUTION INTRAMUSCULAR; INTRAVENOUS at 15:54

## 2022-09-12 RX ADMIN — ROPIVACAINE HYDROCHLORIDE 20 ML: 2 INJECTION, SOLUTION EPIDURAL; INFILTRATION at 13:26

## 2022-09-12 RX ADMIN — SUCCINYLCHOLINE CHLORIDE 100 MG: 20 INJECTION, SOLUTION INTRAMUSCULAR; INTRAVENOUS at 13:41

## 2022-09-12 RX ADMIN — DOCUSATE SODIUM 50MG AND SENNOSIDES 8.6MG 1 TABLET: 8.6; 5 TABLET, FILM COATED ORAL at 18:59

## 2022-09-12 RX ADMIN — Medication 100 MCG: at 13:44

## 2022-09-12 RX ADMIN — FENTANYL CITRATE 50 MCG: 50 INJECTION, SOLUTION INTRAMUSCULAR; INTRAVENOUS at 13:41

## 2022-09-12 RX ADMIN — OXYCODONE 10 MG: 5 TABLET ORAL at 17:05

## 2022-09-12 RX ADMIN — FAMOTIDINE 20 MG: 10 INJECTION INTRAVENOUS at 14:20

## 2022-09-12 RX ADMIN — PREGABALIN 75 MG: 75 CAPSULE ORAL at 13:08

## 2022-09-12 RX ADMIN — FAMOTIDINE 20 MG: 20 TABLET, FILM COATED ORAL at 18:59

## 2022-09-12 RX ADMIN — ESCITALOPRAM OXALATE 20 MG: 10 TABLET ORAL at 22:39

## 2022-09-12 RX ADMIN — SODIUM CHLORIDE, POTASSIUM CHLORIDE, SODIUM LACTATE AND CALCIUM CHLORIDE: 600; 310; 30; 20 INJECTION, SOLUTION INTRAVENOUS at 13:36

## 2022-09-12 RX ADMIN — TRAZODONE HYDROCHLORIDE 100 MG: 100 TABLET ORAL at 22:39

## 2022-09-12 RX ADMIN — ACETAMINOPHEN 975 MG: 325 TABLET ORAL at 13:09

## 2022-09-12 RX ADMIN — PROPOFOL 20 MG: 10 INJECTION, EMULSION INTRAVENOUS at 15:14

## 2022-09-12 RX ADMIN — OXYCODONE HYDROCHLORIDE 10 MG: 10 TABLET, FILM COATED, EXTENDED RELEASE ORAL at 13:08

## 2022-09-12 RX ADMIN — LIDOCAINE HYDROCHLORIDE 40 MG: 20 INJECTION, SOLUTION INTRAVENOUS at 13:41

## 2022-09-12 RX ADMIN — ROCURONIUM BROMIDE 10 MG: 10 INJECTION INTRAVENOUS at 13:41

## 2022-09-12 RX ADMIN — Medication 400 MG: at 20:11

## 2022-09-12 RX ADMIN — FENTANYL CITRATE 50 MCG: 50 INJECTION, SOLUTION INTRAMUSCULAR; INTRAVENOUS at 13:21

## 2022-09-12 NOTE — ANESTHESIA PREPROCEDURE EVALUATION
Relevant Problems   No relevant active problems       Anesthetic History     Increased risk of difficult airway and PONV          Review of Systems / Medical History  Patient summary reviewed, nursing notes reviewed and pertinent labs reviewed    Pulmonary            Asthma : well controlled       Neuro/Psych         Psychiatric history    Comments: Anxiety/depression Cardiovascular    Hypertension              Exercise tolerance: >4 METS     GI/Hepatic/Renal     GERD      Hiatal hernia     Endo/Other      Hypothyroidism  Arthritis and anemia    Comments: Fluctuating blood sugars Other Findings              Physical Exam    Airway  Mallampati: II  TM Distance: 4 - 6 cm  Neck ROM: normal range of motion   Mouth opening: Normal     Cardiovascular    Rhythm: regular  Rate: normal         Dental    Dentition: Lower dentition intact and Upper dentition intact     Pulmonary  Breath sounds clear to auscultation               Abdominal         Other Findings            Anesthetic Plan    ASA: 2  Anesthesia type: regional, general and total IV anesthesia - popliteal fossa block and sciatic single shot          Induction: Intravenous  Anesthetic plan and risks discussed with: Patient      Informed consent obtained. Surgeon requests general anesthesia for prolonged procedure and femoral block for revision.

## 2022-09-12 NOTE — OP NOTES
OPERATIVE REPORT     Admit Date: 9/12/2022  Admit Diagnosis: PAINFUL TOTAL KNEE REPLACEMENT-RIGHT    Date of Procedure: 9/12/2022   Preoperative Diagnosis: PAINFUL TOTAL KNEE REPLACEMENT-RIGHT  Postoperative Diagnosis: PAINFUL TOTAL KNEE REPLACEMENT-RIGHT    Procedure: Procedure(s):  REVISION RIGHT TOTAL KNEE ARTHROPLASTY  Surgeon: Willie Teague MD  Assistant(s): Shala More PA-C  Anesthesia: Regional   Estimated Blood Loss: 300cc  Specimens:   ID Type Source Tests Collected by Time Destination   1 : Right Knee Aspirate Joint Fluid Joint, Knee AEROBIC/ANAEROBIC CULTURE, CELL COUNT, SYNOVIAL Roel Donovan MD 9/12/2022 1358 Microbiology   2 : Right Knee Tibia Interface Joint Fluid Joint, Knee AEROBIC/ANAEROBIC CULTURE Roel Donovan MD 9/12/2022 1419 Microbiology   3 : Right distal Femur Joint Fluid Femur AEROBIC/ANAEROBIC CULTURE oRel Donovan MD 9/12/2022 1427 Microbiology      Complications: None          INDICATIONS:     The patient is a 74 y.o., female who has a 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 The patient underwent the appropriate preoperative labs and was indicated for surgery. Informed consent obtained including a discussion of the risks and benefits, which include, but are not limited to, bleeding, infection, neurovascular damage, wound complications, pain and stiffness in the knee, periprosthetic loosening, fracture dislocation and DVT, the patient consented for the procedure. DESCRIPTION OF PROCEDURE:     The proper limb was identified and signed in the preoperative holding area. All questions were answered. After adequate anesthesia, the right lower extremity was prepped and draped in sterile fashion. The patient was positioned supine on the operating room table. Using the old incision a mid-line parapatellar incision was used along with medial arthrotomy. The medial and lateral gutters were debrided of scar and tissue.  Soft tissue was removed from around the patellar component and notch. The MCL was mobilized and the tibia subluxed. The tibial poly-ethylene was removed. The tibia was tested and noted to be quite stable. The femur was then exposed. A sagital saw was used to disrupt the cement bone interface along with flexible osteotomes. The femoral component was removed. Bone loss after the removal of the distal femur was minimal. The MCL was noted to be lax. Reamers and the appropriate cutting guide was used for femoral preparation. A trial femur was then placed. The appropriate polyethylene was selected to balance both the flexion and extension gaps. Soft tissue balancing was done as needed. There was good overall balance to both the flexion and extension gaps. Final components were selected and assembled. All bony surfaces were washed. A cement restrictor was  needed. The cement was applied to the femoral canal and along the cut surfaces. The component was impacted in place with a trial polyethylene. Once the cement had hardened a final poly was selected and impacted in place. Patellar tracking was noted to be normal and central tracking. The capsulotomy was closed w/ 0-vicryl suture and #2 Q-will suture. The sub-cutaneous tissue was closed with a 2-0 Vicryl and sutures. A sterile dressing was applied. The patient was awoken from anesthesia and taken to the recovery room in a stable condiition. OPERATIVE FINDINGS : Marked arthrofibrosis with notable patella baja. Reabsorbtion of the lateral aspect of the femur    IMPLANTS :   Implant Name Type Inv.  Item Serial No.  Lot No. LRB No. Used Action   MEDIUM CEMENT RESTRICTOR   N/A [employer] [id number] Right 1 Implanted   CEMENT BNE SIMPLEX W/GENT -- 10/PK - SN/A  CEMENT BNE SIMPLEX W/GENT -- 10/PK N/A [employer] HOW_WD [id number] Right 2 Implanted   STEM TIB L100MM [id number] KNEE TOT STBL MARKO NENA TRIATHLON - SN/A  STEM TIB L100MM [id number] KNEE TOT STBL MARKO NENA TRIATHLON N/A Luz Maria Shah [employer] [id number] Right 1 Implanted   COMPONENT FEM SZ 3 R KNEE TOT STBL TRIATHLON - SN/A  COMPONENT FEM SZ 3 R KNEE TOT STBL TRIATHLON N/A [employer] [id number] Right 1 Implanted   INSERT TIB PS 4 13 MM KNEE 5/PK X3 TRIATHLON - SN/A  INSERT TIB PS 4 13 MM KNEE 5/PK X3 TRIATHLON N/A [employer] [id number] Right 1 Implanted       POST OPERATIVE CONSIDERATIONS :   WBAT    JUSTIFICATION FOR SURGICAL ASSISTANT:   Surgical Assistant, was requried and necessary in this case, to help with soft tissue retraction, extremity positioning, equiment management, implant management, and wound closure.     Tamica George MD

## 2022-09-12 NOTE — PERIOP NOTES
4th floor Charge RN Naty Jones  RN)  notified of admission/pending return and awaiting receiving ( tba  RN) call-back for assignment to/return to room 401.     4:55 PM  POST ANESTHESIA CARE    DISCHARGE / TRANSFER NOTE  TRANSFER - OUT REPORT:    [] Verbal / Bedside  [x] Phone      report  was / will-be given at 4:55 PM to   282Mary Low  receiving   Sierra Sanders   and being transferred to    [] L&D  [] ICU  [] [facility]  [] MIU  [x] 4th Ortho/Surgical  [] 5th Medical/Surgical     for routine post - op  Following Regional for Procedure(s) (LRB):  REVISION RIGHT TOTAL KNEE ARTHROPLASTY (Right) by  Marlen Leija MD.    Patient Situation, Background, Assessment and Recommendations (SBAR) was provided and included information from the following SBAR report(s) (SBAR, OR Summary, and MAR) which were reviewed with the receiving nurse. Lines:   Peripheral IV 09/12/22 Anterior; Left Forearm (Active)   Site Assessment Clean, dry, & intact 09/12/22 1610   Phlebitis Assessment 0 09/12/22 1610   Infiltration Assessment 0 09/12/22 1610   Dressing Status Clean, dry, & intact 09/12/22 1610   Dressing Type Transparent;Tape 09/12/22 1610   Hub Color/Line Status Patent; Infusing 09/12/22 1610   Alcohol Cap Used Yes 09/12/22 1256        Also Reviewed (checked if applicable):   [] NO ADDITIONAL REVIEWS  [] PCA Drug and Settings     [x] Cold Therapy with extra gels     [] On-Q @  ml/hr   [x] Drains x       [] Significant Wound care/devices (e.g. Wound vac, etc.)     [] Holding pt in PACU awaiting bed availability - additional care provided and eMAR review  [] Vent Settings      [] Critical Care Drips  Contact Precautions:  There are currently no Active Isolations  Patient transported with:  Registered Nurse    Sierra Sanders was:    [x] discharged        via   [x] Wheelchair          to [x] Private Vehicle     [] transferred   [] Carried   [] Taxi / Vehicle \"for Hire\"  [] Walk out  [] Ambulance / Medical Transportation   [] Stretcher  [] Hospital room _**_          [] Bed      Patient was escorted by:      [x] Nurse   [] Volunteer [] Transporter / Technician  [] Parent      [] Spouse / Family /      Patient verbalized     [x] appreciation and was very pleased with care received   [] frustration with care received       throughout their stay. Patient was discharged in     [x] pleasant mood  [] sad mood  [] mad mood . Pain at discharge/transfer was      4-6   /10. Discharge, medication and follow-up instructions were verbalized as understood prior to discharge  (if applicable for same-day procedures being discharged.)    All personal belongings have been returned to patient, and patient/family verbally confirm receiving belongings as all present. Additional Information: Interval bedside SBAR report was completed at 1535. There were:  [x] No changes to patient condition since last assessment and/or communication with receiving RN.    [] There were changes to patient care/condition since last communication with receiving RN and were reviewed at        verbal bedside SBAR change of staff. Patient is in:   [x] No Acute Distress     [] Mild/Moderate Acute discomfort - treated and controlled  [] Acute dicomfort/distress - treated but still not fully controlled  [] Acute dicomfort/distress reported, however maximum allowable dosing has been achieved and no further        doses allowed    Vital Signs are: [x] Stable - consistent with end of PACU care. [] Unstable -  receiving continued care in appropriate setting    [x] 2 RN skin check completed with receiving RN. [x] Spouse/family/caregiver at bedside during/after staff handoff of care. [] No Spouse/family/caregiver with the patient at this time. After report, opportunity for questions and clarification was provided.     Signed: Chang WHITE RN-BC

## 2022-09-12 NOTE — ANESTHESIA PROCEDURE NOTES
Peripheral Block    Start time: 9/12/2022 1:21 PM  End time: 9/12/2022 1:31 PM  Performed by: Betty Frederick DO  Authorized by: Betty Frederick DO       Pre-procedure: Indications: at surgeon's request and post-op pain management    Preanesthetic Checklist: patient identified, risks and benefits discussed, site marked, timeout performed, anesthesia consent given, patient being monitored and fire risk safety assessment completed and verbalized    Timeout Time: 13:21 EDT      Block Type:   Block Type:  Femoral single shot and popliteal  Laterality:  Right  Monitoring:  Continuous pulse ox, frequent vital sign checks, heart rate, responsive to questions and oxygen  Injection Technique:  Single shot  Procedures: ultrasound guided and nerve stimulator    Patient Position: supine  Prep: chlorhexidine    Needle Type:  Stimuplex  Needle Gauge:  22 G  Needle Localization:  Nerve stimulator and ultrasound guidance  Med Admin Time: 9/12/2022 1:31 PM    Assessment:  Number of attempts:  1  Injection Assessment:  Incremental injection every 5 mL, local visualized surrounding nerve on ultrasound, negative aspiration for blood, no paresthesia and no intravascular symptoms  Patient tolerance:  Patient tolerated the procedure well with no immediate complications  Popliteal/Sciatic block performed with nerve stimulation and landmark identification. Needle used was 4\" stimuplex 21g. 20cc 0.2% ropivacaine injected intermittently with negative aspiration.

## 2022-09-12 NOTE — ANESTHESIA POSTPROCEDURE EVALUATION
Procedure(s):  REVISION RIGHT TOTAL KNEE ARTHROPLASTY. regional, general    Anesthesia Post Evaluation      Multimodal analgesia: multimodal analgesia not used between 6 hours prior to anesthesia start to PACU discharge  Patient location during evaluation: PACU  Patient participation: complete - patient participated  Level of consciousness: awake and alert  Pain score: 0  Pain management: adequate  Airway patency: patent  Anesthetic complications: no  Cardiovascular status: hemodynamically stable and acceptable  Respiratory status: acceptable  Hydration status: acceptable  Comments: Patient seen and evaluated; no concerns. Post anesthesia nausea and vomiting:  none      INITIAL Post-op Vital signs:   Vitals Value Taken Time   /53 09/12/22 1650   Temp 37 °C (98.6 °F) 09/12/22 1609   Pulse 86 09/12/22 1651   Resp 15 09/12/22 1651   SpO2 96 % 09/12/22 1651   Vitals shown include unvalidated device data.

## 2022-09-12 NOTE — ANESTHESIA PROCEDURE NOTES
Spinal Block    Performed by: Lina Wright DO  Authorized by: Lina Wright DO     Pre-procedure:   Indications: at surgeon's request and primary anesthetic  Preanesthetic Checklist: patient identified, risks and benefits discussed, anesthesia consent and timeout performed      Spinal Block:   Patient Position:  Seated  Prep Region:  Lumbar  Prep: chlorhexidine      Location:  L3-4  Technique:  Single shot      Med Admin Time: 9/12/2022 12:38 PM    Needle:   Needle Type:  Pencan  Needle Gauge:  25 G  Attempts:  1      Events: CSF confirmed, no blood with aspiration and no paresthesia        Assessment:  Insertion:  Uncomplicated  Patient tolerance:  Patient tolerated the procedure well with no immediate complications

## 2022-09-12 NOTE — PERIOP NOTES
PACU IN REPORT FROM ANESTHESIA    Verbal report received from   Suzette Whitmore   [] MD/DO-Anesthesiologist    [x] CRNA   [x] with student    CHOICE ANESTHESIA:  [x] GENERAL  [] TIVA  [] MAC  [] LOCAL  [x] REGIONAL  [x] SPINAL   [] EPIDURAL   **Note the anesthesia record for medications given intraoperatively. **           [] E.R.A.S. PROTOCOL    SURGICAL PROCEDURE: Procedure(s) (LRB):  REVISION RIGHT TOTAL KNEE ARTHROPLASTY (Right)     SURGEON: Austin Berger MD.    Brief Initial Visual Assessment:    Patient Age: [] Infant(1-12mo)      []Pediatric(1-13yrs)    [] Adolescent(13-18yrs)    [] Adult(18-65yrs)      [x]Geriatric Adult(>65yrs). Patient    [] Alert           []Calm & Cooperative      [] Anxious  Appearance: [] Drowsy      [x] Sedated      [x] Unresponsive     Oriented x  0            Airway:     [x] Patent          [] \"Difficult Airway\" report by Anesthesia                        [] Obstructs easily/Obstructed on arrival          [] Manual airway assistance necessary                         [] Airway improved with head/airway repositioning                       Airway Adjuncts Present: [] Oral Airway    [] Nasal Trumpet    [] ETT    [] LMA            Respiratory  [x] Even   [] Labored   [] Shallow   [] Tachypnea   [] Bradypnea  Pattern:    [x] Non-Labored  [] VENT and/or respiratory assistance     being provided. Skin:     [x] Pink [] Dusky    [] Pale        [x] Warm    [] Hot [] Cool       [] Cold   [x]Dry [] Moist [] Diaphoretic     Membranes:  [x] Pink [] Pale       [x] Moist [] Dry     [] Crusty     Pain:   [x] No Acute Discomfort. 0  /10 Scale [] Verbal Numeric   [] Moderate Discomfort.     [] V.A.S. [] Acute Discomfort. [x] A.N.V.    [] Chronic-Issue Related Discomfort.   [] F.L.A.C.C. Note E-MAR for medications administered.   []Faces, Hollins/Baker    Note assessments documented in flowsheets; any assessment variants to be found in comments or narrative perioperative nurse notes.        Post-anesthesia care now assumed by [facility] BSN, RN-BC

## 2022-09-12 NOTE — PERIOP NOTES
Pt wanting to calibrate glucose  - glucose checked at 93, Self report device at 138 via phone. Jose. PO at this time and PO meds.

## 2022-09-13 LAB
ANION GAP SERPL CALC-SCNC: 5 MMOL/L (ref 5–15)
BUN SERPL-MCNC: 21 MG/DL (ref 6–20)
BUN/CREAT SERPL: 31 (ref 12–20)
CALCIUM SERPL-MCNC: 8.4 MG/DL (ref 8.5–10.1)
CHLORIDE SERPL-SCNC: 104 MMOL/L (ref 97–108)
CO2 SERPL-SCNC: 28 MMOL/L (ref 21–32)
CREAT SERPL-MCNC: 0.67 MG/DL (ref 0.55–1.02)
GLUCOSE BLD STRIP.AUTO-MCNC: 79 MG/DL (ref 65–117)
GLUCOSE BLD STRIP.AUTO-MCNC: 81 MG/DL (ref 65–117)
GLUCOSE BLD STRIP.AUTO-MCNC: 82 MG/DL (ref 65–117)
GLUCOSE BLD STRIP.AUTO-MCNC: 99 MG/DL (ref 65–117)
GLUCOSE SERPL-MCNC: 93 MG/DL (ref 65–100)
HGB BLD-MCNC: 11.1 G/DL (ref 11.5–16)
POTASSIUM SERPL-SCNC: 4.4 MMOL/L (ref 3.5–5.1)
SERVICE CMNT-IMP: NORMAL
SODIUM SERPL-SCNC: 137 MMOL/L (ref 136–145)

## 2022-09-13 PROCEDURE — 2709999900 HC NON-CHARGEABLE SUPPLY

## 2022-09-13 PROCEDURE — 97165 OT EVAL LOW COMPLEX 30 MIN: CPT

## 2022-09-13 PROCEDURE — 97530 THERAPEUTIC ACTIVITIES: CPT

## 2022-09-13 PROCEDURE — 94761 N-INVAS EAR/PLS OXIMETRY MLT: CPT

## 2022-09-13 PROCEDURE — 96376 TX/PRO/DX INJ SAME DRUG ADON: CPT

## 2022-09-13 PROCEDURE — 74011250636 HC RX REV CODE- 250/636: Performed by: PHYSICIAN ASSISTANT

## 2022-09-13 PROCEDURE — 80048 BASIC METABOLIC PNL TOTAL CA: CPT

## 2022-09-13 PROCEDURE — 74011250637 HC RX REV CODE- 250/637: Performed by: PHYSICIAN ASSISTANT

## 2022-09-13 PROCEDURE — 85018 HEMOGLOBIN: CPT

## 2022-09-13 PROCEDURE — 94762 N-INVAS EAR/PLS OXIMTRY CONT: CPT

## 2022-09-13 PROCEDURE — 97110 THERAPEUTIC EXERCISES: CPT

## 2022-09-13 PROCEDURE — 97535 SELF CARE MNGMENT TRAINING: CPT

## 2022-09-13 PROCEDURE — 97116 GAIT TRAINING THERAPY: CPT

## 2022-09-13 PROCEDURE — 82962 GLUCOSE BLOOD TEST: CPT

## 2022-09-13 PROCEDURE — 74011250636 HC RX REV CODE- 250/636: Performed by: NURSE PRACTITIONER

## 2022-09-13 PROCEDURE — 74011250637 HC RX REV CODE- 250/637: Performed by: NURSE PRACTITIONER

## 2022-09-13 PROCEDURE — 97161 PT EVAL LOW COMPLEX 20 MIN: CPT

## 2022-09-13 PROCEDURE — 74011000250 HC RX REV CODE- 250: Performed by: PHYSICIAN ASSISTANT

## 2022-09-13 PROCEDURE — 96374 THER/PROPH/DIAG INJ IV PUSH: CPT

## 2022-09-13 PROCEDURE — 36415 COLL VENOUS BLD VENIPUNCTURE: CPT

## 2022-09-13 PROCEDURE — G0378 HOSPITAL OBSERVATION PER HR: HCPCS

## 2022-09-13 RX ORDER — OXYCODONE HYDROCHLORIDE 5 MG/1
5 TABLET ORAL
Status: DISCONTINUED | OUTPATIENT
Start: 2022-09-13 | End: 2022-09-15 | Stop reason: HOSPADM

## 2022-09-13 RX ORDER — ACARBOSE 50 MG/1
125 TABLET ORAL
Status: DISCONTINUED | OUTPATIENT
Start: 2022-09-13 | End: 2022-09-13

## 2022-09-13 RX ORDER — ACARBOSE 100 MG/1
100 TABLET ORAL
Status: DISCONTINUED | OUTPATIENT
Start: 2022-09-13 | End: 2022-09-15 | Stop reason: HOSPADM

## 2022-09-13 RX ORDER — TRAMADOL HYDROCHLORIDE 50 MG/1
100 TABLET ORAL
Status: DISCONTINUED | OUTPATIENT
Start: 2022-09-13 | End: 2022-09-15 | Stop reason: HOSPADM

## 2022-09-13 RX ORDER — OXYCODONE HYDROCHLORIDE 5 MG/1
10 TABLET ORAL
Status: DISCONTINUED | OUTPATIENT
Start: 2022-09-13 | End: 2022-09-15 | Stop reason: HOSPADM

## 2022-09-13 RX ORDER — ONDANSETRON 2 MG/ML
4 INJECTION INTRAMUSCULAR; INTRAVENOUS
Status: DISCONTINUED | OUTPATIENT
Start: 2022-09-13 | End: 2022-09-15 | Stop reason: HOSPADM

## 2022-09-13 RX ADMIN — Medication 400 MG: at 20:42

## 2022-09-13 RX ADMIN — TRAMADOL HYDROCHLORIDE 100 MG: 50 TABLET ORAL at 10:50

## 2022-09-13 RX ADMIN — FERROUS SULFATE TAB 325 MG (65 MG ELEMENTAL FE) 324 MG: 325 (65 FE) TAB at 09:04

## 2022-09-13 RX ADMIN — ONDANSETRON 4 MG: 2 INJECTION INTRAMUSCULAR; INTRAVENOUS at 14:57

## 2022-09-13 RX ADMIN — TRAZODONE HYDROCHLORIDE 100 MG: 100 TABLET ORAL at 21:00

## 2022-09-13 RX ADMIN — OXYCODONE 10 MG: 5 TABLET ORAL at 14:57

## 2022-09-13 RX ADMIN — ASPIRIN 81 MG: 81 TABLET, COATED ORAL at 17:30

## 2022-09-13 RX ADMIN — Medication 10 ML: at 14:58

## 2022-09-13 RX ADMIN — ACETAMINOPHEN 650 MG: 325 TABLET, FILM COATED ORAL at 00:35

## 2022-09-13 RX ADMIN — ASPIRIN 81 MG: 81 TABLET, COATED ORAL at 09:03

## 2022-09-13 RX ADMIN — THERA TABS 1 TABLET: TAB at 09:03

## 2022-09-13 RX ADMIN — ACARBOSE 100 MG: 100 TABLET ORAL at 14:57

## 2022-09-13 RX ADMIN — OXYCODONE 10 MG: 5 TABLET ORAL at 20:42

## 2022-09-13 RX ADMIN — ACETAMINOPHEN 650 MG: 325 TABLET, FILM COATED ORAL at 05:45

## 2022-09-13 RX ADMIN — ACETAMINOPHEN 650 MG: 325 TABLET, FILM COATED ORAL at 13:02

## 2022-09-13 RX ADMIN — TRAMADOL HYDROCHLORIDE 100 MG: 50 TABLET ORAL at 23:42

## 2022-09-13 RX ADMIN — DOCUSATE SODIUM 50MG AND SENNOSIDES 8.6MG 1 TABLET: 8.6; 5 TABLET, FILM COATED ORAL at 09:03

## 2022-09-13 RX ADMIN — CEFAZOLIN 2 G: 1 INJECTION, POWDER, FOR SOLUTION INTRAMUSCULAR; INTRAVENOUS at 05:45

## 2022-09-13 RX ADMIN — Medication 10 ML: at 05:45

## 2022-09-13 RX ADMIN — ONDANSETRON 4 MG: 2 INJECTION INTRAMUSCULAR; INTRAVENOUS at 20:42

## 2022-09-13 RX ADMIN — TRAMADOL HYDROCHLORIDE 100 MG: 50 TABLET ORAL at 04:31

## 2022-09-13 RX ADMIN — ONDANSETRON 4 MG: 2 INJECTION INTRAMUSCULAR; INTRAVENOUS at 21:01

## 2022-09-13 RX ADMIN — Medication 10 ML: at 22:00

## 2022-09-13 RX ADMIN — TRAMADOL HYDROCHLORIDE 100 MG: 50 TABLET ORAL at 17:30

## 2022-09-13 RX ADMIN — DOCUSATE SODIUM 50MG AND SENNOSIDES 8.6MG 1 TABLET: 8.6; 5 TABLET, FILM COATED ORAL at 17:30

## 2022-09-13 RX ADMIN — ESCITALOPRAM OXALATE 20 MG: 10 TABLET ORAL at 21:01

## 2022-09-13 RX ADMIN — FAMOTIDINE 20 MG: 20 TABLET, FILM COATED ORAL at 17:30

## 2022-09-13 RX ADMIN — ACETAMINOPHEN 650 MG: 325 TABLET, FILM COATED ORAL at 17:30

## 2022-09-13 RX ADMIN — ONDANSETRON 4 MG: 2 INJECTION INTRAMUSCULAR; INTRAVENOUS at 10:51

## 2022-09-13 RX ADMIN — FAMOTIDINE 20 MG: 20 TABLET, FILM COATED ORAL at 09:03

## 2022-09-13 RX ADMIN — FERROUS SULFATE TAB 325 MG (65 MG ELEMENTAL FE) 324 MG: 325 (65 FE) TAB at 17:30

## 2022-09-13 RX ADMIN — FLUTICASONE PROPIONATE 2 SPRAY: 50 SPRAY, METERED NASAL at 10:40

## 2022-09-13 RX ADMIN — POLYETHYLENE GLYCOL 3350 17 G: 17 POWDER, FOR SOLUTION ORAL at 09:02

## 2022-09-13 NOTE — PROGRESS NOTES
11:30 PM  Bedside and Verbal shift change report given to Claudia (oncoming nurse) by Doris Baron (offgoing nurse). Report included the following information SBAR, Procedure Summary, Intake/Output, MAR, and Recent Results.

## 2022-09-13 NOTE — PROGRESS NOTES
Orthopaedic Progress Note  Post Op day: 1 Day Post-Op    September 13, 2022 2:55 PM     Patient: Jeramy Pierre MRN: [id number]  SSN: [id number]    YOB: 1955  Age: 74 y.o. Sex: female      Admit date:  9/12/2022  Date of Surgery:  9/12/2022   Procedures:  Procedure(s):  REVISION RIGHT TOTAL KNEE ARTHROPLASTY  Admitting Physician:  Jonathon Virk MD   Surgeon:  Lauren Lee) and Role:     Luis Antonio Matson MD - Primary    Consulting Physician(s): Treatment Team: Attending Provider: Buddy Alexis MD; Staff Nurse: Jovanna Vela, RN; Primary Nurse: Joel Sawyer, DOROTA; Physical Therapist: Sonali Koo, PT; Occupational Therapist: Patricia Mack OT; Utilization Review: Enedelia Samson RN; Care Manager: Aixa Salazar    SUBJECTIVE:     Jeramy Pierre is a 74 y.o. female is 1 Day Post-Op s/p Procedure(s):  REVISION RIGHT TOTAL KNEE ARTHROPLASTY with an appropriate level of post-operative pain. She feels the nerve block is wearing off and would like something stronger than tramadol. Discussed oxycodone. Pt states she has taken oxycodone previously without issue. Pt having nausea with food intake but managed with zofran. No complaints of dizziness, lightheadedness, chest pain, or shortness of breath. OBJECTIVE:       Physical Exam:  General: Alert, cooperative, no distress. Respiratory: Respirations unlabored  Neurological:  Neurovascular exam within normal limits. Motor: + DF/PF. Dressing/Wound: Immobilizer applied.      Vital Signs:      Patient Vitals for the past 8 hrs:   BP Temp Pulse Resp SpO2   09/13/22 1402 (!) 119/56 -- 81 -- 99 %   09/13/22 1401 104/64 -- 61 -- --   09/13/22 1351 127/68 -- 61 -- 97 %   09/13/22 1217 132/74 97.8 °F (36.6 °C) 64 18 99 %   09/13/22 1100 131/61 -- (!) 55 -- 100 %   09/13/22 1045 116/71 -- (!) 54 -- 98 %   09/13/22 1020 120/60 -- (!) 54 -- 96 %   09/13/22 0823 100/60 97.9 °F (36.6 °C) (!) 53 18 99 %                                          Temp (24hrs), Av.8 °F (36.6 °C), Min:97.4 °F (36.3 °C), Max:98.6 °F (37 °C)      Labs:        Recent Labs     22   HGB 11.1*     Lab Results   Component Value Date/Time    Sodium 137 2022 04:52 AM    Potassium 4.4 2022 04:52 AM    Chloride 104 2022 04:52 AM    CO2 28 2022 04:52 AM    Glucose 93 2022 04:52 AM    BUN 21 (H) 2022 04:52 AM    Creatinine 0.67 2022 04:52 AM    Calcium 8.4 (L) 2022 04:52 AM       PT/OT:        Gait  Gait Abnormalities: Step to gait  Ambulation - Level of Assistance: Assist x2, Minimal assistance  Distance (ft): 40 Feet (ft)  Assistive Device: Brace/Splint, Gait belt, Walker, rolling       Patient mobility  Bed Mobility Training  Supine to Sit: Supervision  Sit to Supine: Contact guard assistance, Assist x2  Scooting: Stand-by assistance  Transfer Training  Sit to Stand: Assist x2, Contact guard assistance  Stand to Sit: Assist x2, Contact guard assistance      Gait Training  Assistive Device: Brace/Splint, Gait belt, Walker, rolling  Ambulation - Level of Assistance: Assist x2, Minimal assistance  Distance (ft): 40 Feet (ft)   Weight Bearing Status  Right Side Weight Bearing: As tolerated        ASSESSMENT / PLAN:   Active Problems:    Failed total knee, right, subsequent encounter (2022)         S/P REVISION RIGHT TOTAL KNEE ARTHROPLASTY PT/OT/Rehab. Patient understands and agrees with plan of care. DVT Prophylaxis ASA bid, SCDs     Nausea Cont zofran 4mg IV prn     Pain Add oxycodone 5-10mg po q4hr prn for moderate to severe pain. Tramadol 50mg po q6hr prn for mild pain. Discharge Disposition Home w/ Family when cleared by therapy.         Signed By: Kasey Yuan NP

## 2022-09-13 NOTE — PROGRESS NOTES
9/13/2022    2:29 PM  CM consult for raised toilet seat noted. CM completed referral via [employer] to [facility], and they accepted. Raised toilet seat to be delivered to hospital prior to DC on 9/14/22.    9:59 AM  Care Management Assessment      Reason for Admission: Elective - Surgery required      ICD-10-CM ICD-9-CM    1. Failed total knee, right, sequela  T84.012S 909.3 pregabalin (Lyrica) 75 mg capsule      oxyCODONE IR (ROXICODONE) 5 mg immediate release tablet      traMADoL (ULTRAM) 50 mg tablet          Assessment:   [x]In person with pt   []Via p/c with pt   []With family member in person. Who/Relation:     []With family member via p/c. Who/Relation:   []Chart Review    RUR: NA - OBS  Risk Level: []Low []Moderate []High  Value-based purchasing: [] Yes [x] No    Advance Directive: Full Code.    [] No AD on file. [x] AD on file. [] Current AD not on file. Copy requested. [] Requests AD, and referral submitted to [facility]. Healthcare Decision Maker:   Primary Decision Maker: Josephine Funez - [phone]    Secondary Decision Maker: Ventura Carrel - [facility] - [phone]        Assessment:    Age: 74 y.o. Sex: [] Male [x]Female     Residency: [x]Private residence []Apartment []Assisted Living []LTC []Other:   Exterior Steps: 0  Interior Steps: 1 flight    Lives With: [x]With spouse (pt's  had a massive stroke and pt is his primary caregiver. Pt's  is in respite care while pt is recovering) []Other family members []Underage children []Alone []Care provider []Other:    Prior functioning:  [x]Independent with ADLs and iADLS []Dependent with ADLs and iADLs []Partial dependence, Specify:     Cognition: [x]Intact []Some spheres some of the time. []Some spheres all of the time. []All spheres all of the time.      Prior transportation method: [x]Self []Spouse []Other family members []Medicaid transport []Other:     Prior DME required:  [x]None []RW []Cane []Crutches []Bedside commode []CPAP []Home O2 (Liter/Provider: ) []Nebulizer   []Shower Chair []Wheelchair []Hospital Bed []Guy []Stair lift []Rollator []Other:    DME available: []None [x]RW []Cane []Crutches []Bedside commode []CPAP []Home O2 (Liter/Provider: ) []Nebulizer   []Shower Chair []Wheelchair []Hospital Bed []Guy []Stair lift []Rollator []Other:    Rehab history: []None [x]Outpatient PT []Home Health (Provider/Date: ) []SNF (Provider/Date: ) []IPR (Provider/Date: ) []LTC (Provider/Date: ) []Hospice (Provider/Date: )  []Other:     Covid vaccination status:   [] Yes, Type:  [] Booster 1 [] Booster 2  [] No  [x] N/A / Not asked    Insurer:   Insurance Information                  [address] [place] [address] [employer] Phone: [phone]    Subscriber: Lisa Baker Subscriber#: [id number]    Group#: [id number] Precert#: --        MEDICAID [facility]/[place] [facility] Phone: --    SubscriberLeandrew Apley Subscriber#: [id number]    Group#: -- Precert#: --            PCP: Juan C Smith   Address: [place] [address]   Phone number: [phone]   Current patient: [x]Yes []No   Approximate date of last visit: 06/02/2022   Access to virtual PCP visits: []Yes [x]No     Financial concerns/barriers: []Yes, explain: [x]No []Unknown/Not discussed    Pharmacy: [facility] [place] Transport: Family     Discharge Concerns: []Yes [x]No []Unknown   Describe:    Comments:           Transition of care plan:    []Unable to determine at this time. Awaiting clinical progress, and disposition recommendations. [] Home with family assistance as needed, and outpatient follow-up. [x] Home with Outpatient PT and outpatient follow-up   Pt aware of OP appt?  [x]Yes, Provider: [address] [place] [address] OP PT  []Not scheduled   Transport provider: Neighbor    [] Home with Home Health   - Provider:     []SNF/IPR   -[]Preferences given:   []Listing provided and preferences requested   -Status: []Pending []Accepted:    -Auth required: []Yes []No    -Auth initiated date:   -3 midnight stay required: []Yes []No  Date satisfied:     [] LTC:     [] Home with Hospice   -Provider:     [] [facility] information provided. [] Other:     Mary Jo Meehan MA    Care Management Interventions  PCP Verified by CM: Yes (Reyna Song)  Mode of Transport at Discharge:  Other (see comment)  Transition of Care Consult (CM Consult): Discharge Planning  MyChart Signup: No  Discharge Durable Medical Equipment: No  Physical Therapy Consult: Yes  Occupational Therapy Consult: Yes  Speech Therapy Consult: No  Support Systems: Spouse/Significant Other, Friend/Neighbor  Confirm Follow Up Transport: Family  Discharge Location  Patient Expects to be Discharged to[de-identified] Home with outpatient services

## 2022-09-13 NOTE — FACE TO FACE
Leader rounding and   Rounded on patient  Patient alert and oriented  Follow up from pre-op Joint Patient Education Class. Patient states class information was valuable in preparing for surgery. Patient states their home space is prepared and safe for recovery. Reviewed ice application, leg positioning, exercises and incentive spirometry use. Opportunity provided for patient to ask questions and provide comments. Questions answered. Patient is alone at home has minimal support. Worried about climbing stairs multiple times/day. She concedes that she will have to ask her neighbor to help her with her dogs.  is post stroke and in respite care.

## 2022-09-13 NOTE — PROGRESS NOTES
1900- Bedside and Verbal shift change report given to Cat NR (oncoming nurse) by Nettie Carcamo (offgoing nurse). Report included the following information SBAR, Kardex, ED Summary, OR Summary, Intake/Output, MAR, Med Rec Status, Cardiac Rhythm NSR, Alarm Parameters , and Dual Neuro Assessment.      Dunia Garvin 107 on-call paged for ORN orders for zofran

## 2022-09-13 NOTE — PROGRESS NOTES
Problem: Knee Replacement: Day of Surgery/Unit  Goal: Activity/Safety  Outcome: Progressing Towards Goal  Goal: Diagnostic Test/Procedures  Outcome: Progressing Towards Goal  Goal: Nutrition/Diet  Outcome: Progressing Towards Goal  Goal: Medications  Outcome: Progressing Towards Goal  Goal: Respiratory  Outcome: Progressing Towards Goal  Goal: Treatments/Interventions/Procedures  Outcome: Progressing Towards Goal  Goal: Psychosocial  Outcome: Progressing Towards Goal  Goal: *Initiate mobility  Outcome: Progressing Towards Goal  Goal: *Optimal pain control at patient's stated goal  Outcome: Progressing Towards Goal  Goal: *Hemodynamically stable  Outcome: Progressing Towards Goal

## 2022-09-13 NOTE — PROGRESS NOTES
Pt alert and oriented. Bed remained in lowest position and fall precautions in place. Call bell in reach. Ice applied and immobilizer remained on RLE throughout shift. Hourly rounding and toileting complete by RN and PCT. Remainder of shift uneventful.

## 2022-09-13 NOTE — PROGRESS NOTES
Occupational Therapy: OT order received, chart reviewed. Patient experiencing RLE buckling, mobility limited, and returned to bed after PT session. Will wait until this afternoon to attempt OT evaluation.    Mel Kim, OTR/L

## 2022-09-13 NOTE — PROGRESS NOTES
Bedside and Verbal shift change report given to Valentina Quiroz RN (oncoming nurse) by Carmen Eubanks RN (offgoing nurse). Report included the following information SBAR, Kardex, Procedure Summary, and Recent Results.

## 2022-09-13 NOTE — PROGRESS NOTES
Spiritual Care Assessment/Progress Note  [address]      NAME: Jada Humphreys      MRN: [id number]  AGE: 74 y.o.  SEX: female  Hindu Affiliation: Druze   Language: English     9/13/2022     Total Time (in minutes): 20     Spiritual Assessment begun in [facility] 4M POST SURG ORT 1 through conversation with:         [x]Patient        [] Family    [] Friend(s)        Reason for Consult: Request by staff     Spiritual beliefs: (Please include comment if needed)     [x] Identifies with a janak tradition:   Judaism/Druze       [] Supported by a janak community:  Did have, seeking new Yarsani home      [] Claims no spiritual orientation:           [] Seeking spiritual identity:                [] Adheres to an individual form of spirituality:           [] Not able to assess:                           Identified resources for coping:      [x] Prayer                               [] Music                  [] Guided Imagery     [x] Family/friends                 [] Pet visits     [] Devotional reading                         [] Unknown     [] Other:                                               Interventions offered during this visit: (See comments for more details)    Patient Interventions: Affirmation of emotions/emotional suffering, Affirmation of janak, Catharsis/review of pertinent events in supportive environment, Initial/Spiritual assessment, patient floor, Life review/legacy, Normalization of emotional/spiritual concerns, Prayer (assurance of), Hindu beliefs/image of God discussed           Plan of Care:     [] Support spiritual and/or cultural needs    [] Support AMD and/or advance care planning process      [] Support grieving process   [] Coordinate Rites and/or Rituals    [] Coordination with community clergy   [] No spiritual needs identified at this time   [] Detailed Plan of Care below (See Comments)  [] Make referral to Music Therapy  [] Make referral to Pet Therapy     [] Make referral to Addiction services  [] Make referral to [facility]  [] Make referral to Spiritual Care Partner  [] No future visits requested        [x] Contact Spiritual Care for further referrals     Comments:   Spiritual Consult for spiritual care for patient, Mrs. Shawnee Medina on 4M1 post med surg. Pt in bed with her right leg elevated, welcoming  and expressed that she is feeling fine now but \"nerve block is beginning to wear off. \" She anticipates some discomfort coming and also has physical therapy coming. She expressed a positive and optimistic attitude. She mainly spoke about her Holiness situation, communicating how important her [employer] janak is to her and her family. PT came and she thanked  for the care and support, and welcomed future visits while she is here.      KWADWO Cook.Div.  [phone]

## 2022-09-13 NOTE — PROGRESS NOTES
Problem: Mobility Impaired (Adult and Pediatric)  Goal: *Acute Goals and Plan of Care (Insert Text)  Description: FUNCTIONAL STATUS PRIOR TO ADMISSION: Patient was independent and active without use of DME.    HOME SUPPORT PRIOR TO ADMISSION: The patient lived with  but did not require assist. 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 He is in respite care for 14 days. Patient reports her neighbor will take her to OP PT. She has no other help lined up. Physical Therapy Goals  Initiated 9/13/2022    1. Patient will move from supine to sit and sit to supine  in bed with independence within 4 days. 2. Patient will perform sit to stand with modified independence within 4 days. 3. Patient will ambulate with modified independence for 100 feet with the least restrictive device within 4 days. 4. Patient will ascend/descend 4 stairs with 1 handrail(s) with minimal assistance/contact guard assist within 4 days. 5. Patient will perform home exercise program per protocol with independence within 4 days. 6. Patient will demonstrate AROM 0-90 degrees in operative joint within 4 days. Outcome: Progressing Towards Goal   PHYSICAL THERAPY EVALUATION  Patient: Kris Rogers (31 y.o. 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 Patient had original surgery in November 2020. She had episode of fall with syncope in October 2021 and resulted in loosening of prosthesis and pain.   Patient has RW and cane but is requesting a riser for her commode. She plans to go to OP PT; states her  does not like anyone to come into their home. Patient may require extra time in hospital to be able to go home safely without assistance lined up. Current Level of Function Impacting Discharge (mobility/balance): Educated patient regarding bed exercises and use of immobilizer when OOB. She has slight extensor lag with SLR. Educated her on how to apply immobilizer. She stood and ambulated to [facility], then in room a short distance with +2 min assist. She is buckling some. Nursing notified. Patient needs 2 people assist and gait belt for safety. Vitals stable. Patient fatigues easily. Functional Outcome Measure: The patient scored 50/100 on the Barthel outcome measure. Other factors to consider for discharge: no help lined up     Patient will benefit from skilled therapy intervention to address the above noted impairments. PLAN :  Recommendations and Planned Interventions: bed mobility training, transfer training, gait training, and therapeutic exercises      Frequency/Duration: Patient will be followed by physical therapy:  twice daily to address goals. Recommendation for discharge: (in order for the patient to meet his/her long term goals)  Outpatient physical therapy follow up recommended for TKA    This discharge recommendation:  Has been made in collaboration with the attending provider and/or case management    IF patient discharges home will need the following DME: requesting riser for commode         SUBJECTIVE:   Patient stated \"I'm a bit OCD; sorry.     OBJECTIVE DATA SUMMARY:   HISTORY:    Past Medical History:   Diagnosis Date    Adverse effect of other narcotics, sequela     Most pills cause hallucination and nausea    Anxiety and depression     Asthma     Benign heart murmur     Difficult intubation 02/16/2012    Patient states she has a small mouth    Hypoglycemia 10/2020    Passes out    Hypothyroidism     Iron deficiency anemia     Osteoarthritis     PONV (postoperative nausea and vomiting)     Snoring     Syncopal episodes 2022    Post Gastric bypass- food helps    Urinary incontinence      Past Surgical History:   Procedure Laterality Date    HX CATARACT REMOVAL Bilateral  & 2012    HX CERVICAL FUSION      C2-7    HX  SECTION  X2    HX CHOLECYSTECTOMY  2018    HX GASTRIC BYPASS      HX HYSTERECTOMY  1992    HX KNEE ARTHROSCOPY Right 2008    HX KNEE REPLACEMENT Right 2020    HX TONSILLECTOMY      HX WISDOM TEETH EXTRACTION         Personal factors and/or comorbidities impacting plan of care: no help lined up to assist her upon discharge other than rides to OP PT    Home Situation  Home Environment: Private residence  # Steps to Enter: 0  One/Two Story Residence: Two story  # of Interior Steps: 14  Lift Chair Available: No  Living Alone: No  Support Systems: Spouse/Significant Other, Friend/Neighbor  Patient Expects to be Discharged to[de-identified] Home with outpatient services  Current DME Used/Available at Home: Walker, rolling, Cane, straight    EXAMINATION/PRESENTATION/DECISION MAKING:   Critical Behavior:  Neurologic State: Alert  Orientation Level: Oriented X4  Cognition: Follows commands, Appropriate for age attention/concentration  Safety/Judgement: Good awareness of safety precautions, Insight into deficits  Hearing:   Auditory  Auditory Impairment: None  Skin:  not fully observed    Range Of Motion:  AROM: Within functional limits              RLE AROM  R Knee Flexion: 88  R Knee Extension: 0        Strength:    Strength: Generally decreased, functional (RLE less due to ongoing effects of block)                    Tone & Sensation:   Tone: Normal              Sensation: Intact (still a bit numb to RLE)                   Functional Mobility:  Bed Mobility:     Supine to Sit: Contact guard assistance  Sit to Supine: Contact guard assistance;Assist x2  Scooting: Stand-by assistance  Transfers:  Sit to Stand: Assist x2;Contact guard assistance  Stand to Sit: Assist x2;Contact guard assistance                       Balance:   Sitting: Intact  Standing: Impaired  Standing - Static: Constant support  Standing - Dynamic : Constant support  Ambulation/Gait Training:  Distance (ft): 10 Feet (ft)  Assistive Device: Brace/Splint;Gait belt;Walker, rolling  Ambulation - Level of Assistance: Assist x2;Minimal assistance        Gait Abnormalities: Step to gait  Right Side Weight Bearing: As tolerated                                     Therapeutic Exercises: Ankle pumps, quad and heel sets, heel slides, SLR    Functional Measure:  Barthel Index:    Bathin  Bladder: 10  Bowels: 10  Groomin  Dressin  Feeding: 10  Mobility: 0  Stairs: 0  Toilet Use: 5  Transfer (Bed to Chair and Back): 5  Total: 50/100       The Barthel ADL Index: Guidelines  1. The index should be used as a record of what a patient does, not as a record of what a patient could do. 2. The main aim is to establish degree of independence from any help, physical or verbal, however minor and for whatever reason. 3. The need for supervision renders the patient not independent. 4. A patient's performance should be established using the best available evidence. Asking the patient, friends/relatives and nurses are the usual sources, but direct observation and common sense are also important. However direct testing is not needed. 5. Usually the patient's performance over the preceding 24-48 hours is important, but occasionally longer periods will be relevant. 6. Middle categories imply that the patient supplies over 50 per cent of the effort. 7. Use of aids to be independent is allowed. Score Interpretation (from [address] [place] [address])    Independent   60-79 Minimally independent   40-59 Partially dependent   20-39 Very dependent   <20 Totally dependent     -Homero Longoria, Barthel, D.W. (1965). Functional evaluation: the Barthel Index.  [facility] (14)2.  -WILIAN Whitaker, [address] (1997). The Barthel activities of daily living index: self-reporting versus actual performance in the old (> or = 75 years). Journal [address] [address]), 14 [facility], [facility] Hospital[facility], Verna Larkin, Anthony Álvarez. (1999). Measuring the change in disability after inpatient rehabilitation; comparison of the responsiveness of the Barthel Index and Functional [place] Measure. Journal of Neurology, Neurosurgery, and Psychiatry, 66(4), 467-035. NOEMI Howard, JOHN Burgos, & Sundeep Ballesteros MSanthoshA. (2004) Assessment of post-stroke quality of life in cost-effectiveness studies: The usefulness of the Barthel Index and the EuroQoL-5D. Quality of Life Research, 15, 577-58           Physical Therapy Evaluation Charge Determination   History Examination Presentation Decision-Making   MEDIUM  Complexity : 1-2 comorbidities / personal factors will impact the outcome/ POC  LOW Complexity : 1-2 Standardized tests and measures addressing body structure, function, activity limitation and / or participation in recreation  LOW Complexity : Stable, uncomplicated  Other outcome measures Barthel  LOW       Based on the above components, the patient evaluation is determined to be of the following complexity level: LOW     Pain Rating:  3/10    Activity Tolerance:   Fair; fatigues easily    After treatment patient left in no apparent distress:   Supine in bed, Call bell within reach, Bed / chair alarm activated, and Side rails x 3    COMMUNICATION/EDUCATION:   The patients plan of care was discussed with: Registered nurse. Fall prevention education was provided and the patient/caregiver indicated understanding. and Patient/family agree to work toward stated goals and plan of care.     Thank you for this referral.  Mary Mccauley, PT   Time Calculation: 40 mins

## 2022-09-13 NOTE — PROGRESS NOTES
Ultrasound IV by Tita Garcia RN :  Procedure Note    Patient meets criteria for US IV insertion. Ultrasound IV education provided to patient. Opportunities for questions given. Ultrasound used for PIV placement:  20 gauge BD Nexiva  L forearm location. 1 X Attempt(s). Flushed with ease; vigorous blood return. Procedure tolerated well. Primary RN aware of IV placement and added to LDA.     Tita Garcia RN

## 2022-09-13 NOTE — PROGRESS NOTES
Problem: Falls - Risk of  Goal: *Absence of Falls  Description: Document Marline Yuen Fall Risk and appropriate interventions in the flowsheet.   9/13/2022 1431 by Lola Max RN  Outcome: Progressing Towards Goal  Note: Fall Risk Interventions:  Mobility Interventions: PT Consult for mobility concerns, Utilize walker, cane, or other assistive device, Utilize gait belt for transfers/ambulation, Patient to call before getting OOB, Bed/chair exit alarm         Medication Interventions: Patient to call before getting OOB, Teach patient to arise slowly, Utilize gait belt for transfers/ambulation    Elimination Interventions: Patient to call for help with toileting needs, Toileting schedule/hourly rounds, Call light in reach, Bed/chair exit alarm    History of Falls Interventions: Bed/chair exit alarm, Room close to nurse's station, Utilize gait belt for transfer/ambulation      9/13/2022 1431 by Lola Max RN  Outcome: Progressing Towards Goal  Note: Fall Risk Interventions:  Mobility Interventions: PT Consult for mobility concerns, Utilize walker, cane, or other assistive device, Utilize gait belt for transfers/ambulation, Patient to call before getting OOB, Bed/chair exit alarm         Medication Interventions: Patient to call before getting OOB, Teach patient to arise slowly, Utilize gait belt for transfers/ambulation    Elimination Interventions: Patient to call for help with toileting needs, Toileting schedule/hourly rounds, Call light in reach, Bed/chair exit alarm    History of Falls Interventions: Bed/chair exit alarm, Room close to nurse's station, Utilize gait belt for transfer/ambulation         Problem: Patient Education: Go to Patient Education Activity  Goal: Patient/Family Education  9/13/2022 1431 by Lola Max RN  Outcome: Progressing Towards Goal  9/13/2022 1431 by Lola Max RN  Outcome: Progressing Towards Goal     Problem: Patient Education: Go to Patient Education Activity  Goal: Patient/Family Education  9/13/2022 1431 by Jocelyne Pena RN  Outcome: Progressing Towards Goal  9/13/2022 1431 by oJcelyne Pena RN  Outcome: Progressing Towards Goal     Problem: Knee Replacement: Post-Op Day 1  Goal: Off Pathway (Use only if patient is Off Pathway)  Outcome: Progressing Towards Goal  Goal: Activity/Safety  Outcome: Progressing Towards Goal  Goal: Diagnostic Test/Procedures  Outcome: Progressing Towards Goal  Goal: Nutrition/Diet  Outcome: Progressing Towards Goal  Goal: Medications  Outcome: Progressing Towards Goal  Goal: Respiratory  Outcome: Progressing Towards Goal  Goal: Treatments/Interventions/Procedures  Outcome: Progressing Towards Goal  Goal: Psychosocial  Outcome: Progressing Towards Goal  Goal: Discharge Planning  Outcome: Progressing Towards Goal  Goal: *Demonstrates progressive activity  Outcome: Progressing Towards Goal  Goal: *Optimal pain control at patient's stated goal  Outcome: Progressing Towards Goal  Goal: *Hemodynamically stable  Outcome: Progressing Towards Goal  Goal: *Discharge plan identified  Outcome: Progressing Towards Goal

## 2022-09-13 NOTE — PROGRESS NOTES
09/13/22      Medicare Outpatient Observation Notice (MOON)/ [place] Outpatient Observation Notice (Mendel Cottrell) provided to patient/representative with verbal explanation of the notice. Time allotted for questions regarding the notice. Patient /representative provided a completed copy of the MOON/VOON notice. Copy placed on bedside chart. Patient declined to sign letter had some additional questions wich advised she could call the [facility] Financial Counselor number on the back of the letter to get questions answered.

## 2022-09-13 NOTE — PROGRESS NOTES
Problem: Self Care Deficits Care Plan (Adult)  Goal: *Therapy Goal (Edit Goal, Insert Text)  Description: FUNCTIONAL STATUS PRIOR TO ADMISSION: Patient was independent and active without use of DME. She has a RW from previous surgery though typically does not use it. HOME SUPPORT: The patient lived alone with no local support. Occupational Therapy  Initiated   1. Patient will perform grooming in stand with modified independence within 7 day(s). 2.  Patient will perform bathing with modified independence within 7 day(s). 3.  Patient will perform lower body dressing with modified independence within 7 day(s). 4.  Patient will perform toilet transfers with modified independence within 7 day(s). 5.  Patient will perform all aspects of toileting with modified independence within 7 day(s). Outcome: Not Met   OCCUPATIONAL THERAPY EVALUATION  Patient: Bryan Ty (86 y.o. 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 Patient lives alone. Will benefit from [facility] OT follow up to ensure safe transition home     Current Level of Function Impacting Discharge (ADLs/self-care): min assist for LB ADL     Functional Outcome Measure: The patient scored 50/100 on the Barthel Index outcome measure     Other factors to consider for discharge:      Patient will benefit from skilled therapy intervention to address the above noted impairments.        PLAN :  Recommendations and Planned Interventions: self care training, functional mobility training, therapeutic exercise, balance training, therapeutic activities, endurance activities, patient education, home safety training, and family training/education    Frequency/Duration: Patient will be followed by occupational therapy 5 times a week to address goals.     Recommendation for discharge: (in order for the patient to meet his/her long term goals)  Occupational therapy at least 2 days/week in the home     This discharge recommendation:  Has been made in collaboration with the attending provider and/or case management    IF patient discharges home will need the following DME: patient owns DME required for discharge       SUBJECTIVE:   Patient pleasant and cooperative     OBJECTIVE DATA SUMMARY:   HISTORY:   Past Medical History:   Diagnosis Date    Adverse effect of other narcotics, sequela     Most pills cause hallucination and nausea    Anxiety and depression     Asthma     Benign heart murmur     Difficult intubation 2012    Patient states she has a small mouth    Hypoglycemia 10/2020    Passes out    Hypothyroidism     Iron deficiency anemia     Osteoarthritis     PONV (postoperative nausea and vomiting)     Snoring     Syncopal episodes 2022    Post Gastric bypass- food helps    Urinary incontinence      Past Surgical History:   Procedure Laterality Date    HX CATARACT REMOVAL Bilateral  &     HX CERVICAL FUSION      C2-7    HX  SECTION  X2    HX CHOLECYSTECTOMY  2018    HX GASTRIC BYPASS      HX HYSTERECTOMY  1992    HX KNEE ARTHROSCOPY Right     HX KNEE REPLACEMENT Right 2020    HX TONSILLECTOMY      HX WISDOM TEETH EXTRACTION         Expanded or extensive additional review of patient history:     Home Situation  Home Environment: Private residence  # Steps to Enter: 0  One/Two Story Residence: Two story  # of Interior Steps: 14  Lift Chair Available: No  Living Alone: No  Support Systems: Spouse/Significant Other, Friend/Neighbor  Patient Expects to be Discharged to[de-identified] Home with outpatient services  Current DME Used/Available at Home: Walker, rolling, Shower chair, Cane, straight, Commode, bedside  Tub or Shower Type: Shower      EXAMINATION OF PERFORMANCE DEFICITS:  Cognitive/Behavioral Status:  Neurologic State: Alert  Orientation Level: Oriented X4  Cognition: Follows commands; Appropriate safety awareness; Appropriate decision making        Safety/Judgement: Good awareness of safety precautions; Insight into deficits    Hearing: Auditory  Auditory Impairment: None    Vision/Perceptual:                                     Range of Motion:    AROM: Within functional limits                         Strength:    Strength: Generally decreased, functional                Coordination:     Fine Motor Skills-Upper: Left Intact; Right Intact    Gross Motor Skills-Upper: Left Intact; Right Intact    Tone & Sensation:    Tone: Normal  Sensation: Intact                      Balance:  Sitting: Intact  Standing: Impaired; Without support  Standing - Static: Good;Constant support  Standing - Dynamic : Fair;Constant support    Functional Mobility and Transfers for ADLs:  Bed Mobility:  Supine to Sit: Supervision  Sit to Supine: Contact guard assistance;Assist x2  Scooting: Stand-by assistance    Transfers:  Sit to Stand: Assist x2;Contact guard assistance  Stand to Sit: Assist x2;Contact guard assistance  Bathroom Mobility: Contact guard assistance  Toilet Transfer : Contact guard assistance    ADL Assessment:  Feeding: Independent    Oral Facial Hygiene/Grooming: Contact guard assistance (standing)    Bathing: Minimum assistance         Upper Body Dressing: Setup    Lower Body Dressing: Minimum assistance    Toileting: Contact guard assistance                ADL Intervention and task modifications:  Cognitive Retraining  Safety/Judgement: Good awareness of safety precautions; Insight into deficits    Bathing: Patient instructed when bathing to not submerge wound in water, stand to shower or sponge bathe, cover wound with plastic and tape to ensure no water reaches bandage/wound without cues. Patient indicated understanding. Dressing joint: Patient instructed and demonstrated to don/doff Right LE first/last minimal cues. Patient instructed and demonstrated to don all clothing while sitting prior to standing, doff all clothing to knees while standing, then sit to doff clothing off from knees to feet to facilitate fall prevention, pain management, and energy conservation with Minimum assistance. Home safety: Patient instructed on home modifications and safety (raise height of ADL objects, appropriate height of chair surfaces, recliner safety, change of floor surfaces, clear pathways) to increase independence and fall prevention. Patient indicated understanding. Standing: Patient instructed and demonstrated during ADLs to walk up to sink/countertop/surfaces, step into walker to increase safety of joint and fall prevention with Minimum assistance. Patient educated about knee anatomy verbally and with pictures and educated to avoid rotation of Right LE. Instructed to apply concept to ADLs within the home (no twisting of knee during reaching across body, square off while using objects, slide objects along surfaces). Patient instructed to increase amount of time standing, observe standing position during ADLs to increase even weight bearing through bilateral LEs to increase independence with ADLs. Goal to be reached 30 days post - op, per orthopedic surgeon or per PT. Patient indicated understanding. Functional Measure:    Barthel Index:  Bathin  Bladder: 10  Bowels: 10  Groomin  Dressin  Feeding: 10  Mobility: 0  Stairs: 0  Toilet Use: 5  Transfer (Bed to Chair and Back): 5  Total: 50/100      The Barthel ADL Index: Guidelines  1. The index should be used as a record of what a patient does, not as a record of what a patient could do. 2. The main aim is to establish degree of independence from any help, physical or verbal, however minor and for whatever reason.   3. The need for supervision renders the patient not independent. 4. A patient's performance should be established using the best available evidence. Asking the patient, friends/relatives and nurses are the usual sources, but direct observation and common sense are also important. However direct testing is not needed. 5. Usually the patient's performance over the preceding 24-48 hours is important, but occasionally longer periods will be relevant. 6. Middle categories imply that the patient supplies over 50 per cent of the effort. 7. Use of aids to be independent is allowed. Score Interpretation (from [address] [address])    Independent   60-79 Minimally independent   40-59 Partially dependent   20-39 Very dependent   <20 Totally dependent     -Mattie Longoria., Barthel, DSanthoshW. (1965). Functional evaluation: the Barthel Index. [facility] ([address] (1997). The Barthel activities of daily living index: self-reporting versus actual performance in the old (> or = 75 years). Journal of [address] [place] [address] 45(7), [facility], SUMA, Rodger Lennon., [facility]. (1999). Measuring the change in disability after inpatient rehabilitation; comparison of the responsiveness of the Barthel Index and Functional [place] Measure. Journal of Neurology, Neurosurgery, and Psychiatry, 66(4), 634-880. Mary Jane Aceves, [place], JOHN Burgos, & Josette Thao, M.A. (2004) Assessment of post-stroke quality of life in cost-effectiveness studies: The usefulness of the Barthel Index and the EuroQoL-5D.  Quality of Life Research, 15, 930-97         Occupational Therapy Evaluation Charge Determination   History Examination Decision-Making   LOW Complexity : Brief history review  LOW Complexity : 1-3 performance deficits relating to physical, cognitive , or psychosocial skils that result in activity limitations and / or participation restrictions  LOW Complexity : No comorbidities that affect functional and no verbal or physical assistance needed to complete eval tasks       Based on the above components, the patient evaluation is determined to be of the following complexity level: LOW   Pain Rating:  R knee pain well controlled today     Activity Tolerance:   Good    After treatment patient left in no apparent distress:    Sitting in chair, Call bell within reach, and Bed / chair alarm activated    COMMUNICATION/EDUCATION:   The patients plan of care was discussed with: Physical therapist and Registered nurse. Home safety education was provided and the patient/caregiver indicated understanding. and Patient/family have participated as able in goal setting and plan of care. This patients plan of care is appropriate for delegation to CHERYL.     Thank you for this referral.  Yane Kay OT  Time Calculation: 29 mins

## 2022-09-13 NOTE — PROGRESS NOTES
Problem: Mobility Impaired (Adult and Pediatric)  Goal: *Acute Goals and Plan of Care (Insert Text)  Description: FUNCTIONAL STATUS PRIOR TO ADMISSION: Patient was independent and active without use of DME.    HOME SUPPORT PRIOR TO ADMISSION: The patient lived with  but did not require assist. 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 He is in respite care for 14 days. Patient reports her neighbor will take her to OP PT. She has no other help lined up. Physical Therapy Goals  Initiated 9/13/2022    1. Patient will move from supine to sit and sit to supine  in bed with independence within 4 days. 2. Patient will perform sit to stand with modified independence within 4 days. 3. Patient will ambulate with modified independence for 100 feet with the least restrictive device within 4 days. 4. Patient will ascend/descend 4 stairs with 1 handrail(s) with minimal assistance/contact guard assist within 4 days. 5. Patient will perform home exercise program per protocol with independence within 4 days. 6. Patient will demonstrate AROM 0-90 degrees in operative joint within 4 days. 9/13/2022 1429 by Hugh Kerns, PT  Outcome: Progressing Towards Goal  9/13/2022 1129 by Hugh Kerns, PT  Outcome: Progressing Towards Goal   PHYSICAL THERAPY TREATMENT  Patient: Alejandra Ritchie (47 y.o. 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 ASSESSMENT  Patient continues with skilled PT services and is progressing towards goals. Patient is impulsive and needs cues to follow PT instructions. Patient checks her BG often and was down to 64.   Asking for peanut butter and crackers. No  peanut butter available so patient got some out of her backpack along with protein packet. Nursing notified and checked BG on their machine which read 79 after patient had eaten 2 crackers. Reports feeling well enough to sit in chair. Nursing aware. BP stable. Current Level of Function Impacting Discharge (mobility/balance): Performs bed exercises with supervision. Improved SLR this session. Dons immobilizer with min assist.  Ambulated to restroom, then to door and back with RW +2 CGA. No buckling this session. Cues for safety needed. 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 Continue treatment per established plan of care. to address goals. Recommendation for discharge: (in order for the patient to meet his/her long term goals)  Outpatient physical therapy follow up recommended for TKA    This discharge recommendation:  Has been made in collaboration with the attending provider and/or case management    IF patient discharges home will need the following DME: raised toilet seat       SUBJECTIVE:   Patient stated I can feel when my sugar is low; I need to eat something.     OBJECTIVE DATA SUMMARY:   Critical Behavior:  Neurologic State: Alert  Orientation Level: Oriented X4  Cognition: Appropriate decision making, Follows commands  Safety/Judgement: Good awareness of safety precautions, Insight into deficits    Range of Motion:  AROM: Within functional limits        RLE AROM  R Knee Flexion: 88  R Knee Extension: 0                Functional Mobility Training:  Bed Mobility:     Supine to Sit: Supervision  Sit to Supine: Contact guard assistance;Assist x2  Scooting: Stand-by assistance        Transfers:  Sit to Stand: Assist x2;Contact guard assistance  Stand to Sit: Assist x2;Contact guard assistance Balance:  Sitting: Intact  Standing: Impaired; Without support  Standing - Static: Good;Constant support  Standing - Dynamic : Fair;Constant support  Ambulation/Gait Training:  Distance (ft): 40 Feet (ft)  Assistive Device: Brace/Splint;Gait belt;Walker, rolling  Ambulation - Level of Assistance: Assist x2;Minimal assistance        Gait Abnormalities: Step to gait  Right Side Weight Bearing: As tolerated                                           Therapeutic Exercises:     EXERCISE   Sets   Reps   Active Active Assist   Passive Self ROM   Comments   Ankle Pumps   [x]                                        []                                        []                                        []                                           Quad Sets   [x]                                        []                                        []                                        []                                           Hamstring Sets   []                                        []                                        []                                        []                                           Short Arc Quads   [x]                                        []                                        []                                        []                                           Knee Extension Stretch     []                                          []                                          []                                          []                                           Heel Slides   [x]                                        []                                        []                                        []                                           Long Arc Quads   []                                        []                                        []                                        []                                           Knee Flexion Stretch   []                                        [] []                                        []                                           Straight Leg Raises   [x]                                        []                                        []                                        []                                               Pain Rating:  3/10    Activity Tolerance:   Good    After treatment patient left in no apparent distress:   Sitting in chair, Call bell within reach, and Bed / chair alarm activated    COMMUNICATION/COLLABORATION:   The patients plan of care was discussed with: Occupational therapist and Registered nurse.      Rubén Rm, PT   Time Calculation: 45 mins

## 2022-09-13 NOTE — PROGRESS NOTES
TOTAL KNEE REVISION ARTHROPLASTY DAILY NOTE     ASSESSMENT / PLAN :   Pain Control : Excellent - Able to sleep and participate with therapy  Overnight Issues : none  Wound or incisional issue : Healing incision with no visible drainage  Therapy / Weight Bearing Recommendations : Weight bear as tolerated with use of a walker and two person assist while mobilizing - knee immobilizer on when standing/walking  DVT Prophylaxis : Mechanical and Aspirin and mechanical lower extremity compression device  Disposition : Home - outpatient PT  Medical Concerns : none - stable  Comments : Discharge home once cleared by PT. POD  1 Day Post-Op s/p Procedure(s):  REVISION RIGHT TOTAL KNEE ARTHROPLASTY     SUBJECTIVE :     Patient notes the following issues : \"I have no pain. I guess the block hasn't worn off yet. \"     OBJECTIVE :     Emily Conquest:    09/12/22 1855 09/1955 09/12/22 2337 09/13/22 0338   BP: 124/73 123/68 99/60 99/63   Pulse: 83 76 60 60   Resp: 16 16 16 16   Temp: 97.6 °F (36.4 °C) 98.2 °F (36.8 °C) 97.7 °F (36.5 °C) 97.5 °F (36.4 °C)   SpO2: 96% 96% 94% 96%   Weight:       Height:               Alert and oriented x3. Examination of the right knee reveals that the dressing is clean, dry and intact. Motor Exam is intact and normal. Pt is able to perform a straight leg raise. Sensation is intact to light touch. No calf pain. Labs:  Recent Labs     09/13/22  0452   HGB 11.1*      K 4.4      CO2 28   BUN 21*   CREA 0.67   GLU 93       CULTURES :  No results found for: [facility]  Lab Results   Component Value Date/Time    Culture result: PENDING 09/12/2022 02:27 PM    Culture result: PENDING 09/12/2022 01:58 PM    Culture result: MRSA NOT PRESENT 08/29/2022 12:09 PM    Culture result:  08/29/2022 12:09 PM     Screening of patient nares for MRSA is for surveillance purposes and, if positive, to facilitate isolation considerations in high risk settings.  It is not intended for automatic decolonization interventions per se as regimens are not sufficiently effective to warrant routine use.       Culture result: ESCHERICHIA COLI (A) 08/29/2022 12:09 PM        Patient mobility           MICROBIOLOGY :   All Micro Results       Procedure Component Value Units Date/Time    CULTURE, BODY FLUID Alen Galeano STAIN [[id number]] Collected: 09/12/22 1358    Order Status: Completed Specimen: Surgical Specimen Updated: 09/13/22 0023     Special Requests: NO SPECIAL REQUESTS        GRAM STAIN NO WBC'S SEEN         NO ORGANISMS SEEN        Culture result: PENDING    CULTURE, BODY FLUID Martha Parr [[id number]] Collected: 09/12/22 1427    Order Status: Completed Specimen: Surgical Specimen Updated: 09/13/22 0014     Special Requests: NO SPECIAL REQUESTS        GRAM STAIN NO WBC'S SEEN         NO ORGANISMS SEEN        Culture result: PENDING    CULTURE, BODY FLUID ARLET SamaniegoCarlos Lynsey [[id number]] Collected: 09/12/22 1419    Order Status: No result Updated: 09/12/22 2306    CULTURE, ANAEROBIC [[id number]] Collected: 09/12/22 1419    Order Status: No result Updated: 09/12/22 2247    CULTURE, ANAEROBIC [[id number]] Collected: 09/12/22 1358    Order Status: No result Updated: 09/12/22 2247    CULTURE, ANAEROBIC [[id number]] Collected: 09/12/22 1427    Order Status: No result Updated: 09/12/22 [address], [place]  Cell [phone]  Kiera Quiles PA-C  Cell [phone]  Office : [phone]  Medical Assistant Staff:  Anuj Lundberg [phone]                 Surgery Scheduler: Keyla López, [address] [place] [address]

## 2022-09-14 LAB
GLUCOSE BLD STRIP.AUTO-MCNC: 84 MG/DL (ref 65–117)
SERVICE CMNT-IMP: NORMAL

## 2022-09-14 PROCEDURE — 74011250637 HC RX REV CODE- 250/637: Performed by: PHYSICIAN ASSISTANT

## 2022-09-14 PROCEDURE — 96376 TX/PRO/DX INJ SAME DRUG ADON: CPT

## 2022-09-14 PROCEDURE — 97116 GAIT TRAINING THERAPY: CPT

## 2022-09-14 PROCEDURE — 74011250637 HC RX REV CODE- 250/637: Performed by: NURSE PRACTITIONER

## 2022-09-14 PROCEDURE — 94761 N-INVAS EAR/PLS OXIMETRY MLT: CPT

## 2022-09-14 PROCEDURE — 74011250636 HC RX REV CODE- 250/636: Performed by: NURSE PRACTITIONER

## 2022-09-14 PROCEDURE — 82962 GLUCOSE BLOOD TEST: CPT

## 2022-09-14 PROCEDURE — 74011000250 HC RX REV CODE- 250: Performed by: PHYSICIAN ASSISTANT

## 2022-09-14 PROCEDURE — G0378 HOSPITAL OBSERVATION PER HR: HCPCS

## 2022-09-14 RX ORDER — OXYCODONE HYDROCHLORIDE 5 MG/1
5 TABLET ORAL
Status: DISCONTINUED | OUTPATIENT
Start: 2022-09-14 | End: 2022-09-14 | Stop reason: SDUPTHER

## 2022-09-14 RX ORDER — OXYCODONE HYDROCHLORIDE 5 MG/1
10 TABLET ORAL
Status: DISCONTINUED | OUTPATIENT
Start: 2022-09-14 | End: 2022-09-14 | Stop reason: SDUPTHER

## 2022-09-14 RX ADMIN — ACARBOSE 100 MG: 100 TABLET ORAL at 11:34

## 2022-09-14 RX ADMIN — TRAMADOL HYDROCHLORIDE 100 MG: 50 TABLET ORAL at 12:44

## 2022-09-14 RX ADMIN — TRAMADOL HYDROCHLORIDE 100 MG: 50 TABLET ORAL at 19:03

## 2022-09-14 RX ADMIN — FAMOTIDINE 20 MG: 20 TABLET, FILM COATED ORAL at 09:29

## 2022-09-14 RX ADMIN — ESCITALOPRAM OXALATE 20 MG: 10 TABLET ORAL at 21:31

## 2022-09-14 RX ADMIN — OXYCODONE 10 MG: 5 TABLET ORAL at 21:35

## 2022-09-14 RX ADMIN — DOCUSATE SODIUM 50MG AND SENNOSIDES 8.6MG 1 TABLET: 8.6; 5 TABLET, FILM COATED ORAL at 09:30

## 2022-09-14 RX ADMIN — ONDANSETRON 4 MG: 2 INJECTION INTRAMUSCULAR; INTRAVENOUS at 00:55

## 2022-09-14 RX ADMIN — TRAMADOL HYDROCHLORIDE 100 MG: 50 TABLET ORAL at 06:34

## 2022-09-14 RX ADMIN — OXYCODONE 10 MG: 5 TABLET ORAL at 15:43

## 2022-09-14 RX ADMIN — ACARBOSE 50 MG: 100 TABLET ORAL at 16:50

## 2022-09-14 RX ADMIN — FERROUS SULFATE TAB 325 MG (65 MG ELEMENTAL FE) 324 MG: 325 (65 FE) TAB at 09:29

## 2022-09-14 RX ADMIN — ASPIRIN 81 MG: 81 TABLET, COATED ORAL at 09:29

## 2022-09-14 RX ADMIN — DOCUSATE SODIUM 50MG AND SENNOSIDES 8.6MG 1 TABLET: 8.6; 5 TABLET, FILM COATED ORAL at 17:53

## 2022-09-14 RX ADMIN — FAMOTIDINE 20 MG: 20 TABLET, FILM COATED ORAL at 17:52

## 2022-09-14 RX ADMIN — THERA TABS 1 TABLET: TAB at 09:29

## 2022-09-14 RX ADMIN — OXYCODONE 10 MG: 5 TABLET ORAL at 09:29

## 2022-09-14 RX ADMIN — ASPIRIN 81 MG: 81 TABLET, COATED ORAL at 17:52

## 2022-09-14 RX ADMIN — TRAZODONE HYDROCHLORIDE 100 MG: 100 TABLET ORAL at 21:31

## 2022-09-14 RX ADMIN — ONDANSETRON 4 MG: 2 INJECTION INTRAMUSCULAR; INTRAVENOUS at 15:51

## 2022-09-14 RX ADMIN — Medication 10 ML: at 21:31

## 2022-09-14 RX ADMIN — OXYCODONE 10 MG: 5 TABLET ORAL at 04:59

## 2022-09-14 RX ADMIN — FERROUS SULFATE TAB 325 MG (65 MG ELEMENTAL FE) 324 MG: 325 (65 FE) TAB at 17:52

## 2022-09-14 RX ADMIN — Medication 400 MG: at 21:31

## 2022-09-14 RX ADMIN — OXYCODONE 10 MG: 5 TABLET ORAL at 00:55

## 2022-09-14 RX ADMIN — ACETAMINOPHEN 650 MG: 325 TABLET, FILM COATED ORAL at 11:38

## 2022-09-14 RX ADMIN — POLYETHYLENE GLYCOL 3350 17 G: 17 POWDER, FOR SOLUTION ORAL at 09:30

## 2022-09-14 RX ADMIN — ACETAMINOPHEN 650 MG: 325 TABLET, FILM COATED ORAL at 17:51

## 2022-09-14 RX ADMIN — Medication 10 ML: at 15:44

## 2022-09-14 RX ADMIN — Medication 10 ML: at 06:35

## 2022-09-14 RX ADMIN — ONDANSETRON 4 MG: 2 INJECTION INTRAMUSCULAR; INTRAVENOUS at 06:35

## 2022-09-14 NOTE — PROGRESS NOTES
Problem: Falls - Risk of  Goal: *Absence of Falls  Description: Document [place] Fall Risk and appropriate interventions in the flowsheet.   Outcome: Progressing Towards Goal  Note: Fall Risk Interventions:  Mobility Interventions: Bed/chair exit alarm, Patient to call before getting OOB, OT consult for ADLs         Medication Interventions: Bed/chair exit alarm, Evaluate medications/consider consulting pharmacy, Patient to call before getting OOB    Elimination Interventions: Bed/chair exit alarm, Call light in reach, Patient to call for help with toileting needs    History of Falls Interventions: Bed/chair exit alarm, Door open when patient unattended, Room close to nurse's station         Problem: Patient Education: Go to Patient Education Activity  Goal: Patient/Family Education  Outcome: Progressing Towards Goal     Problem: Knee Replacement: Post-Op Day 1  Goal: Off Pathway (Use only if patient is Off Pathway)  Outcome: Progressing Towards Goal  Goal: Activity/Safety  Outcome: Progressing Towards Goal  Goal: Diagnostic Test/Procedures  Outcome: Progressing Towards Goal  Goal: Nutrition/Diet  Outcome: Progressing Towards Goal  Goal: Medications  Outcome: Progressing Towards Goal  Goal: Respiratory  Outcome: Progressing Towards Goal  Goal: Treatments/Interventions/Procedures  Outcome: Progressing Towards Goal  Goal: Psychosocial  Outcome: Progressing Towards Goal  Goal: Discharge Planning  Outcome: Progressing Towards Goal  Goal: *Demonstrates progressive activity  Outcome: Progressing Towards Goal  Goal: *Optimal pain control at patient's stated goal  Outcome: Progressing Towards Goal  Goal: *Hemodynamically stable  Outcome: Progressing Towards Goal  Goal: *Discharge plan identified  Outcome: Progressing Towards Goal     Problem: Patient Education: Go to Patient Education Activity  Goal: Patient/Family Education  Outcome: Progressing Towards Goal     Problem: Patient Education: Go to Patient Education Activity  Goal: Patient/Family Education  Outcome: Progressing Towards Goal

## 2022-09-14 NOTE — PROGRESS NOTES
Problem: Mobility Impaired (Adult and Pediatric)  Goal: *Acute Goals and Plan of Care (Insert Text)  Description: FUNCTIONAL STATUS PRIOR TO ADMISSION: Patient was independent and active without use of DME.    HOME SUPPORT PRIOR TO ADMISSION: The patient lived with  but did not require assist. 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 He is in respite care for 14 days. Patient reports her neighbor will take her to OP PT. She has no other help lined up. Physical Therapy Goals  Initiated 9/13/2022    1. Patient will move from supine to sit and sit to supine  in bed with independence within 4 days. 2. Patient will perform sit to stand with modified independence within 4 days. 3. Patient will ambulate with modified independence for 100 feet with the least restrictive device within 4 days. 4. Patient will ascend/descend 4 stairs with 1 handrail(s) with minimal assistance/contact guard assist within 4 days. 5. Patient will perform home exercise program per protocol with independence within 4 days. 6. Patient will demonstrate AROM 0-90 degrees in operative joint within 4 days. 9/14/2022 1513 by Ara Bhatia, PT  Outcome: Progressing Towards Goal  9/14/2022 0944 by Ara Bhatia, PT  Outcome: Progressing Towards Goal   PHYSICAL THERAPY TREATMENT  Patient: Jase Pereira (21 y.o. 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 ASSESSMENT  Patient continues with skilled PT services and is progressing towards goals. Patient this afternoon with continued improvement in physical therapy session emphasizing gait training.  Patient this afternoon tolerates 150ft ambulation in the hallways with SBA and RW, knee immobilizer. There is no instability or loss of balance throughout. Anticipate patient will be ready to stair train tomorrow AM.     Current Level of Function Impacting Discharge (mobility/balance): SBA with RW    Other factors to consider for discharge: patient reports she will have her friend/neighbor take care of her dogs for 2 weeks upon d/c. She reports they are not well behaved and tend to jump on her         PLAN :  Patient continues to benefit from skilled intervention to address the above impairments. Continue treatment per established plan of care. to address goals. Recommendation for discharge: (in order for the patient to meet his/her long term goals)  Outpatient physical therapy follow up recommended for post op TKA    This discharge recommendation:  Has been made in collaboration with the attending provider and/or case management    IF patient discharges home will need the following DME: to be determined (TBD)       SUBJECTIVE:   Patient stated Can you come back tomorrow? Oswald Fraire    OBJECTIVE DATA SUMMARY:   Patient received supine in bed and was agreeable to participate in PT session. Patient was cleared by nursing to participate in PT session. Critical Behavior:  Neurologic State: Alert  Orientation Level: Oriented X4  Cognition: Follows commands  Safety/Judgement: Good awareness of safety precautions, Insight into deficits    Range of Motion:      5 deg knee extension  Did not observe flexion per orders                      Functional Mobility Training:  Bed Mobility:     Supine to Sit: Supervision     Scooting: Supervision        Transfers:  Sit to Stand: Stand-by assistance  Stand to Sit: Stand-by assistance        Bed to Chair: Stand-by assistance                    Balance:  Sitting: Intact  Standing: Intact; With support  Standing - Static: Good  Standing - Dynamic : Fair;Constant support  Ambulation/Gait Training:  Distance (ft): 150 Feet (ft)  Assistive Device: Gait belt;Walker, rolling (knee immobilizer)  Ambulation - Level of Assistance: Stand-by assistance        Gait Abnormalities: Step to gait  Right Side Weight Bearing: As tolerated                                       Pain Rating:  Patient without reports of pain during therapy      Activity Tolerance:   Good and tolerates ADLs without rest breaks    After treatment patient left in no apparent distress:   Sitting in chair, Heels elevated for pressure relief, Call bell within reach, and Bed / chair alarm activated    COMMUNICATION/COLLABORATION:   The patients plan of care was discussed with: Occupational therapist and Registered nurse.      Tennille Rosales PT, DPT   Time Calculation: 16 mins

## 2022-09-14 NOTE — PROGRESS NOTES
Problem: Knee Replacement: Post-Op Day 1  Goal: Activity/Safety  Outcome: Progressing Towards Goal  Goal: Diagnostic Test/Procedures  Outcome: Progressing Towards Goal  Goal: Nutrition/Diet  Outcome: Progressing Towards Goal  Goal: Medications  Outcome: Progressing Towards Goal  Goal: Respiratory  Outcome: Progressing Towards Goal  Goal: Treatments/Interventions/Procedures  Outcome: Progressing Towards Goal  Goal: Psychosocial  Outcome: Progressing Towards Goal  Goal: Discharge Planning  Outcome: Progressing Towards Goal  Goal: *Demonstrates progressive activity  Outcome: Progressing Towards Goal  Goal: *Optimal pain control at patient's stated goal  Outcome: Progressing Towards Goal  Goal: *Hemodynamically stable  Outcome: Progressing Towards Goal  Goal: *Discharge plan identified  Outcome: Progressing Towards Goal

## 2022-09-14 NOTE — PROGRESS NOTES
1900- Bedside and Verbal shift change report given to Cat RN (oncoming nurse) by Brittany Loaiza (offgoing nurse). Report included the following information SBAR, Kardex, ED Summary, OR Summary, Intake/Output, Recent Results, Med Rec Status, Cardiac Rhythm NSR, Alarm Parameters , and Dual Neuro Assessment. Pt had intermittent complaints of pain.   Managed with PRN medications and ice, see MAR

## 2022-09-14 NOTE — PROGRESS NOTES
7:20 PM  Bedside and Verbal shift change report given to Melania Vazquez  (oncoming nurse) by Damion Rossi (offgoing nurse). Report included the following information SBAR, Procedure Summary, Intake/Output, MAR, and Recent Results.

## 2022-09-14 NOTE — PROGRESS NOTES
Problem: Mobility Impaired (Adult and Pediatric)  Goal: *Acute Goals and Plan of Care (Insert Text)  Description: FUNCTIONAL STATUS PRIOR TO ADMISSION: Patient was independent and active without use of DME.    HOME SUPPORT PRIOR TO ADMISSION: The patient lived with  but did not require assist. 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 He is in respite care for 14 days. Patient reports her neighbor will take her to OP PT. She has no other help lined up. Physical Therapy Goals  Initiated 9/13/2022    1. Patient will move from supine to sit and sit to supine  in bed with independence within 4 days. 2. Patient will perform sit to stand with modified independence within 4 days. 3. Patient will ambulate with modified independence for 100 feet with the least restrictive device within 4 days. 4. Patient will ascend/descend 4 stairs with 1 handrail(s) with minimal assistance/contact guard assist within 4 days. 5. Patient will perform home exercise program per protocol with independence within 4 days. 6. Patient will demonstrate AROM 0-90 degrees in operative joint within 4 days. Outcome: Progressing Towards Goal   PHYSICAL THERAPY TREATMENT  Patient: Kalin Pearce (9 y.o. 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 ASSESSMENT  Patient continues with skilled PT services and is progressing towards goals. Patient this morning with good participation and tolerance to physical therapy session emphasizing gait training and restroom use.  Patient reporting increased levels of pain, however agreeable to participate in PT despite offering to return later. Patient donns her knee immobilizer in long sitting in the bed with Mary. She performs bed mobility with supervision level assist and sit > stand transfer with CGA. Patient with increased ambulation tolerance to 80ft in the hallways. There is no buckling or instability of operative with immobilizer donned. Cuing for knee extension through stance. Patient continues with decreased safety awareness and mild impulsivity. Spoke with patient regarding improvements in functional mobility today, but need for improvement in these areas of safety prior to d/c. Patient verbalizes understanding. Current Level of Function Impacting Discharge (mobility/balance): CGA    Other factors to consider for discharge: noted in MD note, no ROM to operative extremity. Requested this and knee immobilizer be added to order set and spoke with ortho NP         PLAN :  Patient continues to benefit from skilled intervention to address the above impairments. Continue treatment per established plan of care. to address goals. Recommendation for discharge: (in order for the patient to meet his/her long term goals)  Outpatient physical therapy follow up recommended for post op TKA    This discharge recommendation:  Has not yet been discussed the attending provider and/or case management    IF patient discharges home will need the following DME: to be determined (TBD)       SUBJECTIVE:   Patient stated I really don't want to fall again.  re: PT reviewing safety considerations    OBJECTIVE DATA SUMMARY:   Patient received supine in bed and was agreeable to participate in PT session. Patient was cleared by nursing to participate in PT session. .      Critical Behavior:  Neurologic State: Alert  Orientation Level: Oriented X4  Cognition: Follows commands  Safety/Judgement: Good awareness of safety precautions, Insight into deficits    Range of Motion:      Did not observe flexion per MD note.   Extension 0-5 deg Functional Mobility Training:  Bed Mobility:     Supine to Sit: Supervision     Scooting: Supervision        Transfers:  Sit to Stand: Contact guard assistance  Stand to Sit: Contact guard assistance        Bed to Chair: Contact guard assistance                    Balance:  Sitting: Intact  Standing: Impaired; Without support  Standing - Static: Good  Standing - Dynamic : Fair;Constant support  Ambulation/Gait Training:  Distance (ft): 80 Feet (ft)  Assistive Device: Gait belt;Walker, rolling  Ambulation - Level of Assistance: Contact guard assistance        Gait Abnormalities: Step to gait  Right Side Weight Bearing: As tolerated                                   Stairs:               Therapeutic Exercises:     EXERCISE   Sets   Reps   Active Active Assist   Passive Self ROM   Comments   Ankle Pumps   []                                        []                                        []                                        []                                           Quad Sets   []                                        []                                        []                                        []                                           Hamstring Sets   []                                        []                                        []                                        []                                           Short Arc Quads   []                                        []                                        []                                        []                                           Knee Extension Stretch 1    []                                          []                                          [x]                                          []                                        10 minutes   Heel Slides   []                                        []                                        []                                        [] Long Arc Quads   []                                        []                                        []                                        []                                           Knee Flexion Stretch   []                                        []                                        []                                        []                                           Straight Leg Raises   []                                        []                                        []                                        []                                               Pain Rating:  Patient reporting 10/10 pain at start of session; despite this patient agreeable to PT (offered to come back at later time) and demonstrates little to no pain behaviors    Activity Tolerance:   Good and tolerates ADLs without rest breaks    After treatment patient left in no apparent distress:   Sitting in chair, Call bell within reach, and Bed / chair alarm activated    COMMUNICATION/COLLABORATION:   The patients plan of care was discussed with: Occupational therapy assistant, Registered nurse, and ortho NP .      Sunday Leal PT, DPT   Time Calculation: 31 mins

## 2022-09-14 NOTE — PROGRESS NOTES
9/14/2022  12:50 PM  Care Management Progress Note      ICD-10-CM ICD-9-CM    1. Failed total knee, right, sequela  T84.012S 909.3 pregabalin (Lyrica) 75 mg capsule      oxyCODONE IR (ROXICODONE) 5 mg immediate release tablet      traMADoL (ULTRAM) 50 mg tablet          RUR:  NA - OBS  Risk Level: [x]Low []Moderate []High  Value-based purchasing: [] Yes [x] No  Bundle patient: [] Yes [x] No   Specify:     Transition of care plan:  Awaiting medical clearance and DC order. PT/OT treating. Home with OP PT. Outpatient follow-up. Pt's friend/family to transport. Care Management Interventions  PCP Verified by CM: Yes (Sharita Goldman)  Mode of Transport at Discharge:  Other (see comment)  Transition of Care Consult (CM Consult): Discharge Planning  MyChart Signup: No  Discharge Durable Medical Equipment: No  Physical Therapy Consult: Yes  Occupational Therapy Consult: Yes  Speech Therapy Consult: No  Support Systems: Spouse/Significant Other, Friend/Neighbor  Confirm Follow Up Transport: Family  Discharge Location  Patient Expects to be Discharged to[de-identified] Home with outpatient services

## 2022-09-14 NOTE — PROGRESS NOTES
TOTAL KNEE REVISION ARTHROPLASTY DAILY NOTE     ASSESSMENT / PLAN :   Pain Control : Acceptable - Some pain at times, but the patient does not wish to increase their medications. Discussed at length with the patient, I will add Oxycodone and home on Oxycodone. Overnight Issues : none  Wound or incisional issue : Healing incision with no visible drainage  Therapy / Weight Bearing Recommendations : Weight bear as tolerated with use of a walker and two person assist while mobilizing. No ROM, may be WBAT with the knee in extension  DVT Prophylaxis : Mechanical and Aspirin and mechanical lower extremity compression device  Disposition : Discharge to home with home health (maximum of 4 visits)  Medical Concerns : Stable and home tomorrow evening. Comments : Stable     POD  2 Days Post-Op s/p Procedure(s):  REVISION RIGHT TOTAL KNEE ARTHROPLASTY     SUBJECTIVE :     Patient notes the following issues : moderate pain not controlled on Tramadol. OBJECTIVE :     Vitals:    09/13/22 1647 09/13/22 2008 09/14/22 0049 09/14/22 0446   BP: 124/72 131/70 110/60 95/63   Pulse: (!) 58 62 60 68   Resp: 18 18 18 18   Temp: 98.7 °F (37.1 °C) 98.4 °F (36.9 °C) 98.2 °F (36.8 °C) 98.4 °F (36.9 °C)   SpO2: 100% 97% 97% 94%   Weight:       Height:               Alert and oriented x3. Examination of the right knee reveals that the dressing is clean, dry and intact. Motor Exam is intact and normal. Pt is able to perform a straight leg raise. Sensation is intact to light touch. No calf pain. Labs:  Recent Labs     09/13/22  0452   HGB 11.1*      K 4.4      CO2 28   BUN 21*   CREA 0.67   GLU 93       CULTURES :  No results found for: SDES  Lab Results   Component Value Date/Time    Culture result: NO GROWTH THUS FAR 09/12/2022 02:27 PM    Culture result: No growth thus far, holding 14 days.  09/12/2022 02:27 PM    Culture result: NO GROWTH THUS FAR 09/12/2022 02:19 PM    Culture result: No growth thus far, holding 14 days. 09/12/2022 02:19 PM    Culture result: NO GROWTH THUS FAR 09/12/2022 01:58 PM    Culture result: No growth thus far, holding 14 days. 09/12/2022 01:58 PM        Patient mobility  Gait  Gait Abnormalities: Step to gait  Ambulation - Level of Assistance: Assist x2, Minimal assistance  Distance (ft): 40 Feet (ft)  Assistive Device: Brace/Splint, Gait belt, Walker, rolling        MICROBIOLOGY :   All Micro Results       Procedure Component Value Units Date/Time    CULTURE, BODY FLUID Fátima Bull STAIN [[id number]] Collected: 09/12/22 1419    Order Status: Completed Specimen: Surgical Specimen Updated: 09/13/22 1524     Special Requests: NO SPECIAL REQUESTS        GRAM STAIN NO WBC'S SEEN         NO ORGANISMS SEEN        Culture result:       No growth thus far, holding 14 days. CULTURE, ANAEROBIC [[id number]] Collected: 09/12/22 1419    Order Status: Completed Specimen: Surgical Specimen Updated: 09/13/22 1408     Special Requests: NO SPECIAL REQUESTS        Culture result: NO GROWTH THUS FAR       CULTURE, ANAEROBIC [[id number]] Collected: 09/12/22 1358    Order Status: Completed Specimen: Surgical Specimen Updated: 09/13/22 1407     Special Requests: NO SPECIAL REQUESTS        Culture result: NO GROWTH THUS FAR       CULTURE, ANAEROBIC [[id number]] Collected: 09/12/22 1427    Order Status: Completed Specimen: Surgical Specimen Updated: 09/13/22 1407     Special Requests: NO SPECIAL REQUESTS        Culture result: NO GROWTH THUS FAR       CULTURE, BODY FLUID ARLET Garrison [[id number]] Collected: 09/12/22 1358    Order Status: Completed Specimen: Surgical Specimen Updated: 09/13/22 1400     Special Requests: NO SPECIAL REQUESTS        GRAM STAIN NO WBC'S SEEN         NO ORGANISMS SEEN        Culture result:       No growth thus far, holding 14 days.           CULTURE, BODY FLUID Shelbi Grissom [[id number]] Collected: 09/12/22 1427    Order Status: Completed Specimen: Surgical Specimen Updated: 09/13/22 1356 Special Requests: NO SPECIAL REQUESTS        GRAM STAIN NO WBC'S SEEN         NO ORGANISMS SEEN        Culture result:       No growth thus far, holding 14 days.                     Vera Brown MD  Cell [phone]  Panda Castaneda PA-C  Cell [phone]  Office : [phone]  Medical Assistant Staff:  Janey Aguirre Case [phone]                 Surgery Scheduler: Kaveh Waterman, [address] [facility] [address] [facility]

## 2022-09-14 NOTE — PROGRESS NOTES
Occupational Therapy Note:Pt declined tx at this time, having 10/10 pain. Will attempt OT later as able.

## 2022-09-15 VITALS
SYSTOLIC BLOOD PRESSURE: 141 MMHG | WEIGHT: 129.63 LBS | HEART RATE: 73 BPM | OXYGEN SATURATION: 98 % | RESPIRATION RATE: 16 BRPM | DIASTOLIC BLOOD PRESSURE: 81 MMHG | TEMPERATURE: 98.6 F | BODY MASS INDEX: 24.47 KG/M2 | HEIGHT: 61 IN

## 2022-09-15 PROCEDURE — G0378 HOSPITAL OBSERVATION PER HR: HCPCS

## 2022-09-15 PROCEDURE — 74011250637 HC RX REV CODE- 250/637: Performed by: PHYSICIAN ASSISTANT

## 2022-09-15 PROCEDURE — 96376 TX/PRO/DX INJ SAME DRUG ADON: CPT

## 2022-09-15 PROCEDURE — 97116 GAIT TRAINING THERAPY: CPT

## 2022-09-15 PROCEDURE — 97530 THERAPEUTIC ACTIVITIES: CPT

## 2022-09-15 PROCEDURE — 74011000250 HC RX REV CODE- 250: Performed by: PHYSICIAN ASSISTANT

## 2022-09-15 PROCEDURE — 74011250636 HC RX REV CODE- 250/636: Performed by: NURSE PRACTITIONER

## 2022-09-15 PROCEDURE — 74011250637 HC RX REV CODE- 250/637: Performed by: NURSE PRACTITIONER

## 2022-09-15 PROCEDURE — 97535 SELF CARE MNGMENT TRAINING: CPT

## 2022-09-15 RX ADMIN — OXYCODONE 10 MG: 5 TABLET ORAL at 09:22

## 2022-09-15 RX ADMIN — OXYCODONE 10 MG: 5 TABLET ORAL at 16:51

## 2022-09-15 RX ADMIN — FLUTICASONE PROPIONATE 2 SPRAY: 50 SPRAY, METERED NASAL at 08:12

## 2022-09-15 RX ADMIN — ACETAMINOPHEN 650 MG: 325 TABLET, FILM COATED ORAL at 11:41

## 2022-09-15 RX ADMIN — SODIUM CHLORIDE, PRESERVATIVE FREE 10 ML: 5 INJECTION INTRAVENOUS at 08:06

## 2022-09-15 RX ADMIN — BUSPIRONE HYDROCHLORIDE 7.5 MG: 5 TABLET ORAL at 11:51

## 2022-09-15 RX ADMIN — ACETAMINOPHEN 650 MG: 325 TABLET, FILM COATED ORAL at 06:12

## 2022-09-15 RX ADMIN — TRAMADOL HYDROCHLORIDE 100 MG: 50 TABLET ORAL at 00:50

## 2022-09-15 RX ADMIN — OXYCODONE 10 MG: 5 TABLET ORAL at 04:06

## 2022-09-15 RX ADMIN — ACETAMINOPHEN 650 MG: 325 TABLET, FILM COATED ORAL at 00:32

## 2022-09-15 RX ADMIN — POLYETHYLENE GLYCOL 3350 17 G: 17 POWDER, FOR SOLUTION ORAL at 08:06

## 2022-09-15 RX ADMIN — DOCUSATE SODIUM 50MG AND SENNOSIDES 8.6MG 1 TABLET: 8.6; 5 TABLET, FILM COATED ORAL at 08:05

## 2022-09-15 RX ADMIN — ASPIRIN 81 MG: 81 TABLET, COATED ORAL at 08:05

## 2022-09-15 RX ADMIN — ONDANSETRON 4 MG: 2 INJECTION INTRAMUSCULAR; INTRAVENOUS at 08:05

## 2022-09-15 RX ADMIN — FERROUS SULFATE TAB 325 MG (65 MG ELEMENTAL FE) 324 MG: 325 (65 FE) TAB at 08:05

## 2022-09-15 RX ADMIN — Medication 10 ML: at 14:00

## 2022-09-15 RX ADMIN — TRAMADOL HYDROCHLORIDE 100 MG: 50 TABLET ORAL at 06:20

## 2022-09-15 RX ADMIN — ONDANSETRON 4 MG: 2 INJECTION INTRAMUSCULAR; INTRAVENOUS at 16:51

## 2022-09-15 RX ADMIN — FERROUS SULFATE TAB 325 MG (65 MG ELEMENTAL FE) 324 MG: 325 (65 FE) TAB at 16:51

## 2022-09-15 RX ADMIN — Medication 10 ML: at 06:12

## 2022-09-15 RX ADMIN — TRAMADOL HYDROCHLORIDE 100 MG: 50 TABLET ORAL at 13:37

## 2022-09-15 RX ADMIN — THERA TABS 1 TABLET: TAB at 08:05

## 2022-09-15 RX ADMIN — FAMOTIDINE 20 MG: 20 TABLET, FILM COATED ORAL at 08:05

## 2022-09-15 RX ADMIN — BISACODYL 10 MG: 10 SUPPOSITORY RECTAL at 13:29

## 2022-09-15 NOTE — PROGRESS NOTES
Problem: Falls - Risk of  Goal: *Absence of Falls  Description: Document Elie De Santiago Fall Risk and appropriate interventions in the flowsheet.   Outcome: Progressing Towards Goal  Note: Fall Risk Interventions:  Mobility Interventions: Bed/chair exit alarm, Assess mobility with egress test         Medication Interventions: Bed/chair exit alarm, Patient to call before getting OOB, Teach patient to arise slowly    Elimination Interventions: Bed/chair exit alarm, Call light in reach    History of Falls Interventions: Bed/chair exit alarm, Door open when patient unattended, Investigate reason for fall

## 2022-09-15 NOTE — PROGRESS NOTES
Problem: Mobility Impaired (Adult and Pediatric)  Goal: *Acute Goals and Plan of Care (Insert Text)  Description: FUNCTIONAL STATUS PRIOR TO ADMISSION: Patient was independent and active without use of DME.    HOME SUPPORT PRIOR TO ADMISSION: The patient lived with  but did not require assist. 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 He is in respite care for 14 days. Patient reports her neighbor will take her to OP PT. She has no other help lined up. Physical Therapy Goals  Initiated 9/13/2022    1. Patient will move from supine to sit and sit to supine  in bed with independence within 4 days. 2. Patient will perform sit to stand with modified independence within 4 days. 3. Patient will ambulate with modified independence for 100 feet with the least restrictive device within 4 days. 4. Patient will ascend/descend 4 stairs with 1 handrail(s) with minimal assistance/contact guard assist within 4 days. 5. Patient will perform home exercise program per protocol with independence within 4 days. 6. Patient will demonstrate AROM 0-90 degrees in operative joint within 4 days. Outcome: Progressing Towards Goal   PHYSICAL THERAPY TREATMENT  Patient: Jeramy Pierre (13 y.o. 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 ASSESSMENT  Patient continues with skilled PT services and is progressing towards goals. Patient this morning with good participation and tolerance to physical therapy session emphasizing gait training. Patient tolerates 120ft ambulation in hallways with SBA and RW.  There is no instability or loss of balance throughout with knee immobilizer donned. Patient ascends/descends 4 steps with R handrail and L SPC to mimic her home interior steps. (She has no steps to enter the house and will be planning to be on the main floor for several days upon d/c). She ambulates to/from the restroom with SBA and RW and manages her perianal hygiene with Supervision. Patient reporting discomfort from vertical metal supports on knee immobilizer at her calcaneus. PT adjusted supports and encouraged keeping knee immobilizer more superior to address, with patient reporting good results. At this time she is clear for d/c when medically indicated. Current Level of Function Impacting Discharge (mobility/balance): SBA with Rw;     Other factors to consider for discharge: Patient reporting some difficulty remembering events from this morning. ... For example, whether she had denied offered oxy from RN, whether the ortho Nps had already seen her or not. .. etc. Spoke with NP regarding    reviewed safety considerations with patient this session including only being upright with RW, taking her time, activity pacing, utilizing her neighbor for assistance with her dogs         PLAN :  Patient continues to benefit from skilled intervention to address the above impairments. Continue treatment per established plan of care. to address goals. Recommendation for discharge: (in order for the patient to meet his/her long term goals)  Outpatient physical therapy follow up recommended for TKA has been set up    This discharge recommendation:  Has been made in collaboration with the attending provider and/or case management    IF patient discharges home will need the following DME: patient owns DME required for discharge       SUBJECTIVE:   Patient stated Caal Squire is better.  re: knee immobilizer adjustments     OBJECTIVE DATA SUMMARY:   Patient received supine in bed and was agreeable to participate in PT session.    Patient was cleared by nursing to participate in PT session. Critical Behavior:  Neurologic State: Alert, Appropriate for age  Orientation Level: Appropriate for age  Cognition: Appropriate decision making, Appropriate for age attention/concentration, Appropriate safety awareness  Safety/Judgement: Good awareness of safety precautions, Insight into deficits    Range of Motion:               0-4 deg knee extension  No operative knee ROM per orders              Functional Mobility Training:  Bed Mobility:     Supine to Sit: Modified independent     Scooting: Modified independent        Transfers:  Sit to Stand: Stand-by assistance  Stand to Sit: Stand-by assistance        Bed to Chair: Stand-by assistance                    Balance:  Sitting: Intact  Standing: Intact; With support  Ambulation/Gait Training:  Distance (ft): 120 Feet (ft)  Assistive Device: Gait belt;Orthotic device; Walker, rolling (knee immobilzer)  Ambulation - Level of Assistance: Stand-by assistance        Gait Abnormalities: Step to gait  Right Side Weight Bearing: As tolerated              Step Length: Left lengthened                    Stairs:  Number of Stairs Trained: 4  Stairs - Level of Assistance: Contact guard assistance   Rail Use: Right  (with L SPC)          Pain Ratin/10 at start of session; RN present and administered oxy at start of session    Activity Tolerance:   Good and tolerates ADLs without rest breaks    After treatment patient left in no apparent distress:   Sitting in chair, Heels elevated for pressure relief, Call bell within reach, and Bed / chair alarm activated    COMMUNICATION/COLLABORATION:   The patients plan of care was discussed with: Occupational therapist, Registered nurse, and ortho NP .      Oliva Bartlett PT, DPT   Time Calculation: 40 mins

## 2022-09-15 NOTE — PROGRESS NOTES
Pt being discharged home w/ son. Pt in wheelchair to main entrance to return home. All belongings given. Ice packs given. Discharge instructions given, all questions answered.

## 2022-09-15 NOTE — PROGRESS NOTES
9/15/2022  3:16 PM    Care Management Progress Note      ICD-10-CM ICD-9-CM    1. Failed total knee, right, sequela  T84.012S 909.3 pregabalin (Lyrica) 75 mg capsule      oxyCODONE IR (ROXICODONE) 5 mg immediate release tablet      traMADoL (ULTRAM) 50 mg tablet          RUR:  NA - OBS  Risk Level: [x]Low []Moderate []High  Value-based purchasing: [] Yes [x] No  Bundle patient: [] Yes [x] No   Specify:     Transition of care plan:  Awaiting medical clearance and DC order. PT/OT treating. Home with OP PT. Outpatient follow-up. Pt's friend/family to transport. Care Management Interventions  PCP Verified by CM: Yes (Magdaleno Speaker)  Mode of Transport at Discharge:  Other (see comment)  Transition of Care Consult (CM Consult): Discharge Planning  MyChart Signup: No  Discharge Durable Medical Equipment: No  Physical Therapy Consult: Yes  Occupational Therapy Consult: Yes  Speech Therapy Consult: No  Support Systems: Spouse/Significant Other, Friend/Neighbor  Confirm Follow Up Transport: Family  Discharge Location  Patient Expects to be Discharged to[de-identified] Home with outpatient services

## 2022-09-15 NOTE — PROGRESS NOTES
Problem: Self Care Deficits Care Plan (Adult)  Goal: *Therapy Goal (Edit Goal, Insert Text)  Description: FUNCTIONAL STATUS PRIOR TO ADMISSION: Patient was independent and active without use of DME. She has a RW from previous surgery though typically does not use it. HOME SUPPORT: The patient lived alone with no local support. Occupational Therapy  Initiated   1. Patient will perform grooming in stand with modified independence within 7 day(s). 2.  Patient will perform bathing with modified independence within 7 day(s). 3.  Patient will perform lower body dressing with modified independence within 7 day(s). 4.  Patient will perform toilet transfers with modified independence within 7 day(s). 5.  Patient will perform all aspects of toileting with modified independence within 7 day(s). Outcome: Progressing Towards Goal   OCCUPATIONAL THERAPY TREATMENT  Patient: Jack Díaz (35 y.o. 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 ASSESSMENT  Patient continues with skilled OT services and is progressing towards goals. Pt ambulated to restroom, managed toileting mod I. She donned underwear, bra and dress mod I. Pt tightens/ loosens velcro and manages brace. She is clear from an OT standpoint once medically clear. Current Level of Function Impacting Discharge (ADLs): Mod I dressing, toileting    Other factors to consider for discharge:          PLAN :  Patient continues to benefit from skilled intervention to address the above impairments. Continue treatment per established plan of care to address goals.     Recommend with staff: pt is clear from an OT standpoint    Recommend next OT session: Pt is clear from an OT standpoint    Recommendation for discharge: (in order for the patient to meet his/her long term goals)  Per MD    This discharge recommendation:  Has not yet been discussed the attending provider and/or case management    IF patient discharges home will need the following DME:        SUBJECTIVE:   Patient stated I did it.     OBJECTIVE DATA SUMMARY:   Cognitive/Behavioral Status:  Neurologic State: Alert; Appropriate for age  Orientation Level: Appropriate for age  Cognition: Appropriate decision making; Appropriate for age attention/concentration; Appropriate safety awareness             Functional Mobility and Transfers for ADLs:  Bed Mobility:   Not tested as pt already up in the chair    Transfers:     Functional Transfers  Toilet Transfer : Modified independent       Balance:  Sitting: Intact  Standing: With support    ADL Intervention:       Upper Body Dressing Assistance  Dressing Assistance: Independent  Bra: Independent  Pullover Shirt: Independent    Lower Body Dressing Assistance  Dressing Assistance: Modified independent  Underpants: Modified independent  Position Performed: Seated edge of bed    Toileting  Toileting Assistance: Modified independent  Bladder Hygiene: Independent         Activity Tolerance:   Good    After treatment patient left in no apparent distress:   Sitting in chair    COMMUNICATION/COLLABORATION:   The patients plan of care was discussed with: Occupational therapist and Registered nurse.      JOHN Gomez/L  Time Calculation: 22 mins

## 2022-09-15 NOTE — PROGRESS NOTES
Orthopaedic Progress Note  Post Op day: 3 Days Post-Op    September 15, 2022 10:51 AM     Patient: Masood Vasquez MRN: [id number]  SSN: [id number]    YOB: 1955  Age: 74 y.o. Sex: female      Admit date:  2022  Date of Surgery:  2022   Procedures:  Procedure(s):  REVISION RIGHT TOTAL KNEE ARTHROPLASTY  Admitting Physician:  Clover Zaidi MD   Surgeon:  Sander Chauhan) and Role:     Nighat King MD - Primary    Consulting Physician(s): Treatment Team: Attending Provider: Stormy Hutchison MD; Utilization Review: Mary Lind RN; Care Manager: Oliverio Ch Utilization Review: Marifer Toussaint RN; Primary Nurse: Chirag Kline RN; Primary Nurse: Spenser Rodrigez RN; Physical Therapist: Antonina Cevallos PT; Occupational Therapy Assistant: CHERYL Alexander    SUBJECTIVE:     Masood Vasquez is a 74 y.o. female is 3 Days Post-Op s/p Procedure(s):  REVISION RIGHT TOTAL KNEE ARTHROPLASTY with an appropriate level of post-operative pain. No complaints of nausea, vomiting, dizziness, lightheadedness, chest pain, or shortness of breath. OBJECTIVE:       Physical Exam:  General: Alert, cooperative, no distress. Respiratory: Respirations unlabored  Neurological:  Neurovascular exam within normal limits. Motor: + DF/PF. Musculoskeletal: Right leg in immobilizer. Calves soft, supple, no erythema or edema. Dressing/Wound:  Clean, dry and intact. No significant erythema or swelling.       Vital Signs:      Patient Vitals for the past 8 hrs:   BP Temp Pulse Resp SpO2   09/15/22 0756 123/72 97.7 °F (36.5 °C) 71 16 99 %   09/15/22 0355 97/63 97.9 °F (36.6 °C) 63 16 95 %                                          Temp (24hrs), Av.1 °F (36.7 °C), Min:97.7 °F (36.5 °C), Max:98.9 °F (37.2 °C)      Labs:        Recent Labs     22  0452   HGB 11.1*     Lab Results   Component Value Date/Time    Sodium 137 2022 04:52 AM    Potassium 4.4 2022 04:52 AM    Chloride 104 09/13/2022 04:52 AM    CO2 28 09/13/2022 04:52 AM    Glucose 93 09/13/2022 04:52 AM    BUN 21 (H) 09/13/2022 04:52 AM    Creatinine 0.67 09/13/2022 04:52 AM    Calcium 8.4 (L) 09/13/2022 04:52 AM       PT/OT:        Gait  Gait Abnormalities: Step to gait  Ambulation - Level of Assistance: Stand-by assistance  Distance (ft): 150 Feet (ft)  Assistive Device: Gait belt, Walker, rolling (knee immobilizer)       Patient mobility  Bed Mobility Training  Supine to Sit: Supervision  Sit to Supine: Contact guard assistance, Assist x2  Scooting: Supervision  Transfer Training  Sit to Stand: Stand-by assistance  Stand to Sit: Stand-by assistance  Bed to Chair: Stand-by assistance  Toilet Transfer : Stand-by assistance, Adapative equipment      Gait Training  Assistive Device: Gait belt, Walker, rolling (knee immobilizer)  Ambulation - Level of Assistance: Stand-by assistance  Distance (ft): 150 Feet (ft)   Weight Bearing Status  Right Side Weight Bearing: As tolerated        ASSESSMENT / PLAN:   Active Problems:    Failed total knee, right, subsequent encounter (9/12/2022)         S/P REVISION RIGHT TOTAL KNEE ARTHROPLASTY PT/OT/Rehab. Immobilizer to right knee x 2 weeks. No ROM at this time. WBAT with right knee in extension. DVT Prophylaxis ASA bid, SCDs     Pain Oxycodone and tramadol prn. Discharge Disposition Home today with scheduled outpatient PT.        Signed By: Noemi Shields NP    RN, MSN, FNP-C  Ariel

## 2022-09-19 ENCOUNTER — HOME HEALTH ADMISSION (OUTPATIENT)
Dept: HOME HEALTH SERVICES | Facility: HOME HEALTH | Age: 67
End: 2022-09-19

## 2022-09-19 LAB
BACTERIA SPEC CULT: ABNORMAL
GRAM STN SPEC: ABNORMAL
GRAM STN SPEC: ABNORMAL
SERVICE CMNT-IMP: ABNORMAL

## 2022-09-26 LAB
BACTERIA SPEC CULT: NORMAL
GRAM STN SPEC: NORMAL
SERVICE CMNT-IMP: NORMAL

## 2022-09-29 RX ORDER — SULFAMETHOXAZOLE AND TRIMETHOPRIM 800; 160 MG/1; MG/1
1 TABLET ORAL 2 TIMES DAILY
Qty: 20 TABLET | Refills: 0 | Status: SHIPPED | OUTPATIENT
Start: 2022-09-29 | End: 2022-10-09

## 2022-11-30 ENCOUNTER — VIRTUAL VISIT (OUTPATIENT)
Dept: ENDOCRINOLOGY | Age: 67
End: 2022-11-30
Payer: MEDICARE

## 2022-11-30 DIAGNOSIS — E55.9 VITAMIN D DEFICIENCY: ICD-10-CM

## 2022-11-30 DIAGNOSIS — E78.2 MIXED HYPERLIPIDEMIA: ICD-10-CM

## 2022-11-30 DIAGNOSIS — D64.9 ANEMIA, UNSPECIFIED TYPE: ICD-10-CM

## 2022-11-30 DIAGNOSIS — E16.2 HYPOGLYCEMIA: Primary | ICD-10-CM

## 2022-11-30 DIAGNOSIS — E53.8 VITAMIN B 12 DEFICIENCY: ICD-10-CM

## 2022-11-30 DIAGNOSIS — Z98.84 S/P GASTRIC BYPASS: ICD-10-CM

## 2022-11-30 RX ORDER — ACARBOSE 100 MG/1
100 TABLET ORAL
Qty: 270 TABLET | Refills: 3 | Status: SHIPPED | OUTPATIENT
Start: 2022-11-30

## 2022-11-30 NOTE — PATIENT INSTRUCTIONS
SPECIFIC INSTRUCTIONS BELOW       ACARBOSE 100  MG RIGHT BEFORE EACH MEAL        ON    trulicity   from [facility]       eloise  - FOR TRULICITY         -------------PAY ATTENTION TO THESE GENERAL INSTRUCTIONS -----------------      - The medications prescribed at this visit will not be available at pharmacy until 6 pm       - YOUR MED LIST IS NOT UP TO DATE AS SOME CHANGES ARE BEING MADE AFTER THE VISIT - FOLLOW SPECIFIC INSTRUCTIONS  ABOVE     -ANY tests other than blood work, which you opt to do  outside the  [facility] facilities, you are responsible for prior authorizations if  required    - [phone] [facility]- PLEASE IGNORE     Results     *Normal results will not be notified by a phone call starting January 1 2021   *If you have an upcoming visit, the results will be discussed at the visit   *Please sign up for MY CHART if you want access to your lab and test results  *Abnormal results which require immediate attention will be notified by phone call   *Abnormal results which do not require immediate assistance will be notified in 1-2 weeks       Refills    -    have your pharmacy send us a refill request . Refills are done max for one year and a visit is a must before refills are extended    Follow up appointments -  highly encourage you to make it when you are checking out. We can accommodate you into the schedule based on your clinical situation, but not for extending refills beyond a year. Labs are important to give refills and is important to get labs before the visit     Phone calls  -  Allow  24 hrs.  for non-urgent calls to be returned  Prior authorization - It may take 2-4 weeks to process  Forms  -  FMLA, DMV etc., will take up to 2 weeks to process  Cancellations - please notify the office 2 days in advance   Samples  - will only be dispensed at visits       If not showing for the appointments and cancelling appointments within 24 hours are kept track of and three  of such situations in  two consecutive years will likely be considered for termination from the practice    -------------------------------------------------------------------------------------------------------------------

## 2022-11-30 NOTE — PROGRESS NOTES
**THIS IS A VIRTUAL VISIT VIA AUDIO- VIDEO SYNCHRONOUS DISCUSSION. PATIENT AGREED TO HAVE THEIR CARE DELIVERED OVER A Jo[employer]ART/DOXY. ME VIDEO VISIT IN PLACE OF THEIR REGULARLY SCHEDULED OFFICE VISIT**   Pt  is aware that this is a billable encounter and is responsible for copays/deductibles   Patient gave a verbal consent to proceed with virtual video visit   Patient is at home and I, the provider,  am at the office care diabetes and endocrinology      HISTORY OF PRESENT ILLNESS    Mina Vanessa is a 74  y.o. female. follow up visit after  Last  visit for  Hypoglycemias after  June 24 2022   She moved to [facility]  and has NOT gotten  dexcom sensors       June 2022    In the interim, she got DEXCOM gladly  But happened to change insurance carrier   Unable to get over the nausea from trulicity   She is doing well with dexcom - says he has  to not exceed 8 gm of carbs   Lost 3 lbs       Feb 2022       GAINED  10 lbs   She had a fall from low sugar         August 2021   She is referred by pcp  Supposed to see Dr. Ahsan Broderick   As patient required immediate help, she is changed to my schedule   s/p bariatric surgery  In  July 2013 @ [facility]   By Dr. Thony Taylor   5 years after surgery  - she started  Noticing hypoglycemias   The hypoglycemias as happening more often and are going to as low as 45 mg   She is afraid that her  cannot be of help because of the stroke he had and is non verbal   She is on [place] 2 - she notices the discrepancy and hopes to get dexcom      Review of Systems   None       Physical Exam   Constitutional: She is oriented to person, place, and time. She appears well-developed and well-nourished. Psychiatric: She has a normal mood and affect. ASSESSMENT and PLAN      1. Hypoglycemia - s/p gastric bypass - reactive    She is in constant worry about \" dropping low \" and passing out .  Her  had a major stroke and is non verbal     She will be on trulicity  ( lam cares ) And  Acarbose 100 mg tid      Not on sensor as she is waiting for it  from [facility] Germ[facility]     She has severe hypoglycemias  at home  and there is no one at home to help her     Costella Channel   is being seen for Vane Torres  Patient  performs 4 times of blood glucose checks a day utilizing the home BGM. Patient does adjustments to the regimen by sliding scale or bolus wizard  on the basis of therapeutic CGM testing results          June 2022     the MOVE to dexcom has really helped her tremendously and she has figured out the  \" diet \" which fits her    She is c/o  Nausea   From   trulicity   She wants to control it by diet ( < 8 gm of sugars per meal )  And by using acarbose only        Reviewed   DEXCOM clarity downloaded report for 14 days and discussed with pt    - dexcom - a1c is 5.6  % for 14 days   - 14 day average glucose 145  -  1 % for high and   2 %  low sugars and % in [employer]  Range  95  %    - percent of utiization 93 %  - CV% 24    The surgeon has offered  Last resort  To reverse the  Gastric bypass surgery        Feb 2022   She never tried trulicity  For fear of brochure saying low sugars   She is definitely eating more carbs as she gained 10 lbs   It is understandable of  Her vicious cycle   She is to bring in the food log   Motivated her to  [facility] AGP  report for 14 days and discussed with pt    - 14 day average glucose 88  - 2 % for high and  Very high   0%    23   % low sugars and % in Target  Range 74  %    - percent of utiization 70 %  - CV% 33.5 %           2. S/p gastric bypass surgery - she has severe   Hypoglycemias      3. Educated on hypoglycemia management, Sent GLUCAGON       4. Vit d def - check labs     5. B12 def - check labs     6.  Anemia  - check cbc and iron       Reviewed results with patient and discussed the labs being ordered today/bnv  Patient voiced understanding of plan of care

## 2022-12-07 NOTE — TELEPHONE ENCOUNTER
----- Message from Cornelio Capellan sent at 11/30/2021  4:52 PM EST -----  Subject: Refill Request    QUESTIONS  Name of Medication? traZODone (DESYREL) 50 mg tablet  Patient-reported dosage and instructions? two tablets by mouth nightly  How many days do you have left? 1  Preferred Pharmacy? [facility] phone number (if available)? [phone]  Additional Information for Provider? Quantity? 100 mg  ---------------------------------------------------------------------------  --------------,  Name of Medication? escitalopram oxalate (LEXAPRO) 20 mg tablet  Patient-reported dosage and instructions? One tablet by mouth nightly  How many days do you have left? 2  Preferred Pharmacy? [facility] [facility] phone number (if available)? [phone]  Additional Information for Provider? Quantity? 90 Tablets  ---------------------------------------------------------------------------  --------------  CALL BACK INFO  What is the best way for the office to contact you? OK to leave message on   voicemail  Preferred Call Back Phone Number?  [phone]

## 2022-12-19 RX ORDER — DICLOFENAC SODIUM 10 MG/G
GEL TOPICAL
Qty: 100 G | Refills: 0 | Status: SHIPPED | OUTPATIENT
Start: 2022-12-19

## 2023-01-12 NOTE — TELEPHONE ENCOUNTER
Called and spoke to patient. Fae Habermann) Patient states she has already seen Endocrinology, (Dr Hilda Sheikh)   and is requesting for Dr Luz Maria Naylor to review lab results from 8/5/21. OV is scheduled with Dr Luz Maria Naylor on 9/1/21 and will review results then if no immediate concerns.
Please advise pt needs a sooner appt then Feb . Pt wants to know if will send in a letter to Dr. Rodo Dietz so she can get an earlier appt
complains of pain/discomfort

## 2023-01-26 ENCOUNTER — NURSE TRIAGE (OUTPATIENT)
Dept: OTHER | Facility: CLINIC | Age: 68
End: 2023-01-26

## 2023-01-26 ENCOUNTER — VIRTUAL VISIT (OUTPATIENT)
Dept: FAMILY MEDICINE CLINIC | Age: 68
End: 2023-01-26
Payer: MEDICARE

## 2023-01-26 DIAGNOSIS — R19.7 DIARRHEA, UNSPECIFIED TYPE: Primary | ICD-10-CM

## 2023-01-26 DIAGNOSIS — E11.69 TYPE 2 DIABETES MELLITUS WITH OTHER SPECIFIED COMPLICATION, WITHOUT LONG-TERM CURRENT USE OF INSULIN (HCC): ICD-10-CM

## 2023-01-26 NOTE — TELEPHONE ENCOUNTER
Location of patient: [address] owen from [facility] at [facility] with [employer]. Current Symptoms: diarrhea 5 times per day, fatigue    Urinated last 4 hours ago    Onset: 5 days ago;     Pain Severity: 0/10; Temperature: denies     What has been tried: drinking water    Denies - bloody black or tarry stool / difficulty with urination    Recommended disposition: See in Office Today    Care advice provided, patient verbalizes understanding; denies any other questions or concerns; instructed to call back for any new or worsening symptoms. Patient/Caller agrees with recommended disposition; writer provided warm [facility] for appointment scheduling    Attention Provider: Thank you for allowing me to participate in the care of your patient. The patient was connected to triage in response to information provided to the [facility]. Please do not respond through this encounter as the response is not directed to a shared pool.       Reason for Disposition   MODERATE diarrhea (e.g., 4-6 times / day more than normal) and present > 48 hours (2 days)    Protocols used: Diarrhea-ADULT-[place]

## 2023-01-26 NOTE — PROGRESS NOTES
Consent: Sanjiv Louis, who was seen by synchronous (real-time) audio-video technology, and/or her healthcare decision maker, is aware that this patient-initiated, Telehealth encounter on 1/26/2023 is a billable service, with coverage as determined by her insurance carrier. She is aware that she may receive a bill and has provided verbal consent to proceed: YES-Consent obtained within past 12 months  712    Prior to Admission medications    Medication Sig Start Date End Date Taking? Authorizing Provider   diclofenac (VOLTAREN) 1 % gel APPLY 2 GRAMS TOPICALLY TO AFFECTED AREA EVERY 6 HOURS FOR 23 DAYS 12/19/22   Kiara Johnson MD   acarbose (PRECOSE) 100 mg tablet Take 1 Tablet by mouth three (3) times daily (with meals). 11/30/22   Jennifer Ritter MD   aspirin delayed-release 81 mg tablet Take 1 Tablet by mouth two (2) times a day. 9/12/22   Awa Somers MD   ibuprofen (MOTRIN) 800 mg tablet Take 1 Tablet by mouth every six (6) hours as needed for Pain. 9/12/22   Awa Somers MD   pregabalin (Lyrica) 75 mg capsule Take 1 Capsule by mouth daily (with dinner). Max Daily Amount: 75 mg. 9/12/22   Awa Somers MD   acetaminophen (Acetaminophen Pain Relief) 500 mg tablet Take 2 Tablets by mouth every six (6) hours as needed for Pain. 9/12/22   Awa Somers MD   ondansetron (ZOFRAN ODT) 8 mg disintegrating tablet Take 0.5 Tablets by mouth every eight (8) hours as needed for Nausea. 9/12/22   Awa Somers MD   cefuroxime axetil (CEFTIN PO) Take 250 mg by mouth two (2) times a day. Provider, Historical   glucagon (Glucagon Emergency Kit, human,) 1 mg solr INJECT 0.5 MG SUBCUTANEOUSLY AS NEEDED FOR  HYPOGLYCEMIA. 8/30/22   Jennifer Ritter MD   CALCIUM PO Take 1 Tablet by mouth three (3) times daily (with meals). Provider, Historical   multivitamin (ONE A DAY) tablet Take 1 Tablet by mouth three (3) times daily (with meals).  Bariatric Multi vitamin    Provider, Historical   ergocalciferol, vitamin D2, (VITAMIN D2 PO) Take 1 Tablet by mouth two (2) times a day. Provider, Historical   MAGNESIUM PO Take 1 Tablet by mouth nightly. Provider, Historical   traZODone (DESYREL) 100 mg tablet Take 1 Tablet by mouth nightly. 8/1/22   Flo Perdomo MD   escitalopram oxalate (LEXAPRO) 20 mg tablet TAKE 1 TABLET BY MOUTH  DAILY  Patient taking differently: nightly. 7/28/22   Flo Perdomo MD   dulaglutide (Trulicity) 6.94 MF/1.8 mL sub-q pen sundays 5/24/22   Provider, Historical   ferrous sulfate 324 mg (65 mg iron) tablet Take 324 mg by mouth two (2) times daily (with meals). Provider, Historical   busPIRone (BUSPAR) 7.5 mg tablet Take 1 Tablet by mouth two (2) times daily as needed (anxiety). 5/19/22   Flo Perdomo MD   fluticasone propionate (FLONASE) 50 mcg/actuation nasal spray 2 Sprays by Both Nostrils route daily. 2/3/22   Flo Perdomo MD     Allergies   Allergen Reactions    Nsaids (Non-Steroidal Anti-Inflammatory Drug) Other (comments)     Gastric bypass           Assessment & Plan:   Diagnoses and all orders for this visit:    1. Diarrhea, unspecified type  Patient having some diarrhea for the last 6 days. Loose stools 5-6 times a day. No blood in stool. No fevers chills nausea or vomiting or pain. Patient not taking any medication as of yet. Recommend take over-the-counter Imodium if not better by Monday let me know. 2. Type 2 diabetes mellitus with other specified complication, without long-term current use of insulin (HCC)  Last A1c by endocrine was good. Medication Side Effects and Warnings were discussed with patient  Patient Labs were reviewed and or requested:  Patient Past Records were reviewed and or requested              We discussed the expected course, resolution and complications of the diagnosis(es) in detail. Medication risks, benefits, costs, interactions, and alternatives were discussed as indicated.   I advised her to contact the office if her condition worsens, changes or fails to improve as anticipated. She expressed understanding with the diagnosis(es) and plan. Fer Ness, was evaluated through a synchronous (real-time) audio-video encounter. The patient (or guardian if applicable) is aware that this is a billable service, which includes applicable co-pays. This Virtual Visit was conducted with patient's (and/or legal guardian's) consent. The visit was conducted pursuant to the emergency declaration under the [address] [place] [address] authority and the [employer] and [employer] [place] General Act. Patient identification was verified, and a caregiver was present when appropriate. The patient was located in a state where the provider was licensed to provide care. Services were provided through a video synchronous discussion virtually to substitute for in-person clinic visit. Patient and provider were located at their individual homes. I have discussed the diagnosis with the patient and the intended plan as seen in the above orders. The patient understands and agrees with the plan. The patient has received an after-visit summary and questions were answered concerning future plans. Medication Side Effects and Warnings were discussed with patient  Patient Labs were reviewed and or requested:  Patient Past Records were reviewed and or requested    Marija Rangel M.D. There are no Patient Instructions on file for this visit.

## 2023-02-06 ENCOUNTER — DOCUMENTATION ONLY (OUTPATIENT)
Dept: ENDOCRINOLOGY | Age: 68
End: 2023-02-06

## 2023-02-06 NOTE — PROGRESS NOTES
Updated chart note faxed to [facility] with [address].

## 2023-02-20 ENCOUNTER — DOCUMENTATION ONLY (OUTPATIENT)
Dept: FAMILY MEDICINE CLINIC | Age: 68
End: 2023-02-20

## 2023-02-23 DIAGNOSIS — E16.2 HYPOGLYCEMIA: ICD-10-CM

## 2023-02-23 RX ORDER — ACARBOSE 100 MG/1
100 TABLET ORAL
Qty: 270 TABLET | Refills: 3 | Status: SHIPPED | OUTPATIENT
Start: 2023-02-23

## 2023-04-28 ENCOUNTER — OFFICE VISIT (OUTPATIENT)
Dept: ENDOCRINOLOGY | Age: 68
End: 2023-04-28

## 2023-04-28 VITALS
OXYGEN SATURATION: 98 % | DIASTOLIC BLOOD PRESSURE: 70 MMHG | HEART RATE: 64 BPM | SYSTOLIC BLOOD PRESSURE: 111 MMHG | BODY MASS INDEX: 26.7 KG/M2 | TEMPERATURE: 97.3 F | WEIGHT: 141.4 LBS | HEIGHT: 61 IN

## 2023-04-28 DIAGNOSIS — T84.012S FAILED TOTAL KNEE, RIGHT, SEQUELA: ICD-10-CM

## 2023-04-28 DIAGNOSIS — Z98.84 S/P GASTRIC BYPASS: ICD-10-CM

## 2023-04-28 DIAGNOSIS — M25.561 ACUTE PAIN OF RIGHT KNEE: ICD-10-CM

## 2023-04-28 DIAGNOSIS — E16.2 HYPOGLYCEMIA: Primary | ICD-10-CM

## 2023-04-28 RX ORDER — ONDANSETRON 8 MG/1
4 TABLET, ORALLY DISINTEGRATING ORAL
Qty: 30 TABLET | Refills: 0 | Status: SHIPPED | OUTPATIENT
Start: 2023-04-28

## 2023-04-28 RX ORDER — PREGABALIN 75 MG/1
75 CAPSULE ORAL 2 TIMES DAILY
Qty: 180 CAPSULE | Refills: 1 | Status: SHIPPED | OUTPATIENT
Start: 2023-04-28

## 2023-04-28 RX ORDER — DULAGLUTIDE 1.5 MG/.5ML
1.5 INJECTION, SOLUTION SUBCUTANEOUS
COMMUNITY

## 2023-04-28 RX ORDER — MELATONIN
Qty: 180 TABLET | Refills: 3 | Status: SHIPPED | OUTPATIENT
Start: 2023-04-28

## 2023-04-28 NOTE — PATIENT INSTRUCTIONS
SPECIFIC INSTRUCTIONS BELOW         ACARBOSE 100  MG RIGHT BEFORE EACH MEAL        ON    trulicity   [facility]  - FOR TRULICITY               -------------Radha 1958 -----------------      - The medications prescribed at this visit will not be available at pharmacy until 6 pm       - YOUR MED LIST IS NOT UP TO DATE AS SOME CHANGES ARE BEING MADE AFTER THE VISIT - FOLLOW SPECIFIC INSTRUCTIONS  ABOVE     -ANY tests other than blood work, which you opt to do  outside the  [facility] imaging facilities, you are responsible for prior authorizations if  required    - [address] [facility]- PLEASE IGNORE     Results     *Normal results will not be notified by a phone call starting January 1 2021   *If you have an upcoming visit, the results will be discussed at the visit   *Please sign up for MY CHART if you want access to your lab and test results  *Abnormal results which require immediate attention will be notified by phone call   *Abnormal results which do not require immediate assistance will be notified in 1-2 weeks       Refills    -    have your pharmacy send us a refill request . Refills are done max for one year and a visit is a must before refills are extended    Follow up appointments -  highly encourage you to make it when you are checking out. We can accommodate you into the schedule based on your clinical situation, but not for extending refills beyond a year. Labs are important to give refills and is important to get labs before the visit     Phone calls  -  Allow  24 hrs.  for non-urgent calls to be returned  Prior authorization - It may take 2-4 weeks to process  Forms  -  FMLA, DMV etc., will take up to 2 weeks to process  Cancellations - please notify the office 2 days in advance   Samples  - will only be dispensed at visits       If not showing for the appointments and cancelling appointments within 24 hours are kept track of and three  of such situations in  two consecutive years will likely be considered for termination from the practice    -------------------------------------------------------------------------------------------------------------------

## 2023-04-28 NOTE — PROGRESS NOTES
Mena Fleming MD FACE       HISTORY OF PRESENT ILLNESS    Keri Rothman is a 79  y.o. female. follow up visit after  Last  visit for  Hypoglycemias after  nov virtual visit  in  2022       Bowen Cervantes  is giving  supply danna sensors now   She is in pain today , dislikes use of  NSAIDS because of her fear of developing ulcers        Nov 2022     She moved to [facility]  and has NOT gotten  dexcom sensors       June 2022    In the interim, she got DEXCOM gladly  But happened to change insurance carrier   Unable to get over the nausea from trulicity   She is doing well with dexcom - says he has  to not exceed 8 gm of carbs   Lost 3 lbs             August 2021   She is referred by pcp  Supposed to see Dr. Barbette Lesch   As patient required immediate help, she is changed to my schedule   s/p bariatric surgery  In  July 2013 @ [facility]   By Dr. Keyanna Cobb   5 years after surgery  - she started  Noticing hypoglycemias   The hypoglycemias as happening more often and are going to as low as 45 mg   She is afraid that her  cannot be of help because of the stroke he had and is non verbal   She is on Danna 2 - she notices the discrepancy and hopes to get dexcom      Review of Systems   Right knee joint pain ( replacement  and  revision)   Physical Exam   Constitutional: She is oriented to person, place, and time. She appears well-developed and well-nourished. Psychiatric: She has a normal mood and affect. ASSESSMENT and PLAN      1. Hypoglycemia - s/p gastric bypass - reactive    She is in constant worry about \" dropping low \" and passing out .  Her  had a major stroke and is non verbal         April 2023    She feels very secure since using  dexcom   Stay on trulicity and  acarbose     [facility]   [facility] clarity downloaded report for 14 days and discussed with pt    - dexcom - a1c is 6.8 % for 14 days   - 14 day average glucose 145  - GMI  6 %   -  > 1 % for high and < 1  % low sugars and % in [employer]  Range  96 %    - percent of utiization 93  %  - CV% 29.7%            Nov video visit     She will be on trulicity  ( lam [facility] )   And  Acarbose 100 mg tid    Not on sensor as she is waiting for it  from [facility]   She has severe hypoglycemias  at home  and there is no one at home to help her     Farhat Millard   is being seen for [facility]  Patient has problematic  hypoglycemia  with  recurrent 3 hypoglycemic events  ( < 54 mg )  that changes her mental or physical state requiring EMS help to treat her hypoglycemia          2. S/p gastric bypass surgery - she has severe   Hypoglycemias      3. Educated on hypoglycemia management, on  GLUCAGON       4. Vit d def - slightly low      5. B12 def -  low normal side      6. Anemia  - levels are good       7.  Right knee joint pain  :  discussed as today being  Friday, I could increase lyrica bid, and also mentioned about the drowsiness       Reviewed results with patient and discussed the labs being ordered today/bnv  Patient voiced understanding of plan of care

## 2023-04-28 NOTE — PROGRESS NOTES
Spenser Zelaya is a 79 y.o. female here for   Chief Complaint   Patient presents with    Blood sugar problem    Vitamin D Deficiency    Vitamin B12 Deficiency       1. Have you been to the ER, urgent care clinic since your last visit? Hospitalized since your last visit? - No     2. Have you seen or consulted any other health care providers outside of the [address] [place] [address] since your last visit?   Include any pap smears or colon screening.- No

## 2023-05-08 DIAGNOSIS — Z98.84 S/P GASTRIC BYPASS: ICD-10-CM

## 2023-05-08 DIAGNOSIS — E16.2 HYPOGLYCEMIA: Primary | ICD-10-CM

## 2023-06-05 ENCOUNTER — HOSPITAL ENCOUNTER (OUTPATIENT)
Facility: HOSPITAL | Age: 68
Discharge: HOME | End: 2023-06-08
Payer: MEDICARE

## 2023-06-05 ENCOUNTER — NURSE TRIAGE (OUTPATIENT)
Dept: OTHER | Facility: CLINIC | Age: 68
End: 2023-06-05

## 2023-06-05 ENCOUNTER — OFFICE VISIT (OUTPATIENT)
Age: 68
End: 2023-06-05
Payer: MEDICARE

## 2023-06-05 VITALS
RESPIRATION RATE: 16 BRPM | TEMPERATURE: 98.5 F | WEIGHT: 140 LBS | OXYGEN SATURATION: 97 % | DIASTOLIC BLOOD PRESSURE: 60 MMHG | BODY MASS INDEX: 26.43 KG/M2 | HEIGHT: 61 IN | HEART RATE: 60 BPM | SYSTOLIC BLOOD PRESSURE: 91 MMHG

## 2023-06-05 DIAGNOSIS — M54.50 LUMBAR PAIN: Primary | ICD-10-CM

## 2023-06-05 DIAGNOSIS — M54.50 LUMBAR PAIN: ICD-10-CM

## 2023-06-05 PROCEDURE — 3078F DIAST BP <80 MM HG: CPT | Performed by: PHYSICIAN ASSISTANT

## 2023-06-05 PROCEDURE — 1123F ACP DISCUSS/DSCN MKR DOCD: CPT | Performed by: PHYSICIAN ASSISTANT

## 2023-06-05 PROCEDURE — 99213 OFFICE O/P EST LOW 20 MIN: CPT | Performed by: PHYSICIAN ASSISTANT

## 2023-06-05 PROCEDURE — 72100 X-RAY EXAM L-S SPINE 2/3 VWS: CPT

## 2023-06-05 PROCEDURE — 3074F SYST BP LT 130 MM HG: CPT | Performed by: PHYSICIAN ASSISTANT

## 2023-06-05 RX ORDER — PREDNISONE 10 MG/1
TABLET ORAL
Qty: 21 EACH | Refills: 0 | Status: SHIPPED | OUTPATIENT
Start: 2023-06-05

## 2023-06-05 SDOH — ECONOMIC STABILITY: HOUSING INSECURITY
IN THE LAST 12 MONTHS, WAS THERE A TIME WHEN YOU DID NOT HAVE A STEADY PLACE TO SLEEP OR SLEPT IN A SHELTER (INCLUDING NOW)?: NO

## 2023-06-05 SDOH — ECONOMIC STABILITY: FOOD INSECURITY: WITHIN THE PAST 12 MONTHS, YOU WORRIED THAT YOUR FOOD WOULD RUN OUT BEFORE YOU GOT MONEY TO BUY MORE.: SOMETIMES TRUE

## 2023-06-05 SDOH — ECONOMIC STABILITY: FOOD INSECURITY: WITHIN THE PAST 12 MONTHS, THE FOOD YOU BOUGHT JUST DIDN'T LAST AND YOU DIDN'T HAVE MONEY TO GET MORE.: SOMETIMES TRUE

## 2023-06-05 SDOH — ECONOMIC STABILITY: INCOME INSECURITY: HOW HARD IS IT FOR YOU TO PAY FOR THE VERY BASICS LIKE FOOD, HOUSING, MEDICAL CARE, AND HEATING?: HARD

## 2023-06-05 NOTE — TELEPHONE ENCOUNTER
[facility]    Received call from CARLOS LUNA [facility] with [employer]. Subjective: Caller states \"My back just hurts. I don't think I did anything to it like an injury. My knee is killing me. Nothing touches the pain. It is hard to walk and get around. \"     Current Symptoms: right lower back pain. Urinary frequency. No dysuria. Urinary urgency. No hematuria. History of frequent UTI's, 2-3 a year. States this pain feels different than a UTI. Nausea without emesis. Pain worse with movement. Since Thursday lowers FSBS 40, able to catch and treat according. Onset: 4 days ago; worsening    Associated Symptoms: right knee. History of total knee and review 1.5 years ago. Knee swollen, worse than baseline. Pain Severity: 8/10; sharp in back. Aching in knee  constant    Temperature: denies     What has been tried: Acetaminophen 1000mg. LMP: NA Pregnant: NA    Recommended disposition: See in Office Today    Care advice provided, patient verbalizes understanding; denies any other questions or concerns; instructed to call back for any new or worsening symptoms. Patient/Caller agrees with recommended disposition; writer provided warm transfer to [place] at [facility] for appointment scheduling    Attention Provider: Thank you for allowing me to participate in the care of your patient. The patient was connected to triage in response to information provided to the ECC/PSC. Please do not respond through this encounter as the response is not directed to a shared pool.         Reason for Disposition   SEVERE back pain (e.g., excruciating, unable to do any normal activities) and not improved after pain medicine and CARE ADVICE    Protocols used: Back Pain-ADULT-[place]

## 2023-06-05 NOTE — PROGRESS NOTES
Chief Complaint   Patient presents with    Lower Back Pain    Knee Pain       1. Patient in office today for lower back pain that worsened last week. Pt reports weakness in LE. Denies numbness or tingling in LE. Pt does report moving umbrella stand prior to lower back pain. Have been treating with asper creme with no relief noted. 2.Have c/o right knee pain that has worsened since Thursday. Swelling and weakness noted in right knee. Have been treating with otc pain relief cream with no relief noted. Denies injury that exacerbated knee pain. Pt did have right knee replacement x2-last surgery was 9/2021. Pt has seen surgeon was advised swelling is expected for up to 2yrs. 1. Have you been to the ER, urgent care clinic since your last visit? Hospitalized since your last visit? No    2. Have you seen or consulted any other health care providers outside of the [address] [place] [address] since your last visit? Include any pap smears or colon screening.  No
LUMBAR SPINE (2-3 VIEWS); Future  -     predniSONE 10 MG (21) TBPK; Use as directed. Steroid pack has been sent to the pharmacy. I also would like for the patient to get an x-ray done of her back. She will be contact with these results once it is back. Patient may need consult with an orthopedic doctor. No follow-up provider specified. I have discussed the diagnosis with the patient and the intended plan as seen in the above orders. The patient has received an after-visit summary and questions were answered concerning future plans.      Medication Side Effects and Warnings were discussed with patient: Yes  Patient Labs were reviewed and or requested: No  Patient Past Records were reviewed and or requested  Yes    Mariah Mcgill PA-C

## 2023-06-06 ENCOUNTER — TELEPHONE (OUTPATIENT)
Age: 68
End: 2023-06-06

## 2023-06-06 NOTE — TELEPHONE ENCOUNTER
----- Message from Trenton Fisher sent at 6/6/2023 12:57 PM EDT -----  Subject: Results Request    QUESTIONS  Results: X Ray lower back;  Ordered by:   Date Performed: 2023-06-05  ---------------------------------------------------------------------------  --------------  Kavitha Porter QQJL    [phone]; OK to leave message on voicemail  ---------------------------------------------------------------------------  --------------

## 2023-06-06 NOTE — TELEPHONE ENCOUNTER
Patient called and notified we had not received results yet from xray yesterday. Results not released per [facility]. Informed patient to continue with steroids, and I will check on results tomorrow, since [facility]  is off, I will have Dr. Avina Pulse check them and result. I will follow up with her tomorrow.

## 2023-06-07 ENCOUNTER — TELEPHONE (OUTPATIENT)
Age: 68
End: 2023-06-07

## 2023-06-07 NOTE — TELEPHONE ENCOUNTER
Can you please result images. They are now released to her chart. I need to call the patient with the results. Thanks.

## 2023-06-07 NOTE — TELEPHONE ENCOUNTER
Patient called and verified. Results/ recommendations reviewed with patient. Instructed to continue steroids as prescribed by ALEKSEY Lemus. Should start feeling better in a couple days, but will call or send message if not. Patient verbalizes understanding.

## 2023-06-07 NOTE — TELEPHONE ENCOUNTER
I called ASHOK MELO [facility] radiology. Apparently images are sent to [address] [place] [address] reading room. I was transferred to [address] [place] [address] radiology department and spoke to Derick. She pulled up  order and saif the radiologist is at lunch currently, but she will have him read the results and fax them to us and send them to [place]. [facility] message sent to patient.

## 2023-06-29 ENCOUNTER — HOSPITAL ENCOUNTER (OUTPATIENT)
Facility: HOSPITAL | Age: 68
Setting detail: OBSERVATION
Discharge: HOME | End: 2023-06-29
Attending: EMERGENCY MEDICINE | Admitting: INTERNAL MEDICINE
Payer: MEDICARE

## 2023-06-29 ENCOUNTER — OFFICE VISIT (OUTPATIENT)
Age: 68
End: 2023-06-29
Payer: MEDICARE

## 2023-06-29 VITALS
DIASTOLIC BLOOD PRESSURE: 50 MMHG | WEIGHT: 140 LBS | HEIGHT: 61 IN | SYSTOLIC BLOOD PRESSURE: 86 MMHG | OXYGEN SATURATION: 95 % | BODY MASS INDEX: 26.43 KG/M2 | TEMPERATURE: 98.2 F | RESPIRATION RATE: 14 BRPM | HEART RATE: 63 BPM

## 2023-06-29 VITALS
TEMPERATURE: 98.2 F | RESPIRATION RATE: 18 BRPM | WEIGHT: 139 LBS | HEIGHT: 61 IN | BODY MASS INDEX: 26.24 KG/M2 | HEART RATE: 56 BPM | SYSTOLIC BLOOD PRESSURE: 128 MMHG | DIASTOLIC BLOOD PRESSURE: 77 MMHG | OXYGEN SATURATION: 97 %

## 2023-06-29 DIAGNOSIS — M25.561 CHRONIC PAIN OF RIGHT KNEE: ICD-10-CM

## 2023-06-29 DIAGNOSIS — G89.29 CHRONIC PAIN OF RIGHT KNEE: ICD-10-CM

## 2023-06-29 DIAGNOSIS — Z96.651 HISTORY OF TOTAL KNEE REPLACEMENT, RIGHT: ICD-10-CM

## 2023-06-29 DIAGNOSIS — R00.1 SYMPTOMATIC BRADYCARDIA: Primary | ICD-10-CM

## 2023-06-29 DIAGNOSIS — I95.9 HYPOTENSION, UNSPECIFIED HYPOTENSION TYPE: ICD-10-CM

## 2023-06-29 DIAGNOSIS — I95.9 HYPOTENSION, UNSPECIFIED HYPOTENSION TYPE: Primary | ICD-10-CM

## 2023-06-29 LAB
ALBUMIN SERPL-MCNC: 4.1 G/DL (ref 3.5–5.2)
ALBUMIN/GLOB SERPL: 1.7 (ref 1.1–2.2)
ALP SERPL-CCNC: 63 U/L (ref 35–104)
ALT SERPL-CCNC: 27 U/L (ref 10–35)
ANION GAP SERPL CALC-SCNC: 9 MMOL/L (ref 5–15)
APPEARANCE UR: CLEAR
AST SERPL-CCNC: 33 U/L (ref 10–35)
BACTERIA URNS QL MICRO: NEGATIVE /HPF
BASOPHILS # BLD: 0.1 K/UL (ref 0–1)
BASOPHILS NFR BLD: 1 % (ref 0–1)
BILIRUB SERPL-MCNC: 0.3 MG/DL (ref 0.2–1)
BILIRUB UR QL: NEGATIVE
BUN SERPL-MCNC: 23 MG/DL (ref 8–23)
BUN/CREAT SERPL: 40 (ref 12–20)
CALCIUM SERPL-MCNC: 8.7 MG/DL (ref 8.8–10.2)
CHLORIDE SERPL-SCNC: 104 MMOL/L (ref 98–107)
CO2 SERPL-SCNC: 24 MMOL/L (ref 22–29)
COLOR UR: NORMAL
COMMENT:: NORMAL
CREAT SERPL-MCNC: 0.58 MG/DL (ref 0.5–0.9)
DIFFERENTIAL METHOD BLD: ABNORMAL
EOSINOPHIL # BLD: 0.1 K/UL (ref 0–0.4)
EOSINOPHIL NFR BLD: 2 %
EPITH CASTS URNS QL MICRO: NORMAL /LPF
ERYTHROCYTE [DISTWIDTH] IN BLOOD BY AUTOMATED COUNT: 16.2 % (ref 11.5–14.5)
GLOBULIN SER CALC-MCNC: 2.4 G/DL (ref 2–4)
GLUCOSE SERPL-MCNC: 85 MG/DL (ref 65–100)
GLUCOSE UR STRIP.AUTO-MCNC: NEGATIVE MG/DL
HCT VFR BLD AUTO: 39 % (ref 35–47)
HGB BLD-MCNC: 12.6 G/DL (ref 11.5–16)
HGB UR QL STRIP: NEGATIVE
IMM GRANULOCYTES # BLD AUTO: 0 K/UL (ref 0–0.04)
IMM GRANULOCYTES NFR BLD AUTO: 0 % (ref 0–0.5)
KETONES UR QL STRIP.AUTO: NEGATIVE MG/DL
LEUKOCYTE ESTERASE UR QL STRIP.AUTO: NEGATIVE
LYMPHOCYTES # BLD: 1.5 K/UL (ref 0.8–3.5)
LYMPHOCYTES NFR BLD: 27 % (ref 12–49)
MAGNESIUM SERPL-MCNC: 2 MG/DL (ref 1.6–2.4)
MCH RBC QN AUTO: 30.2 PG (ref 26–34)
MCHC RBC AUTO-ENTMCNC: 32.3 G/DL (ref 30–36.5)
MCV RBC AUTO: 93.5 FL (ref 80–99)
MONOCYTES # BLD: 0.4 K/UL (ref 0–1)
MONOCYTES NFR BLD: 7 % (ref 5–13)
NEUTS SEG # BLD: 3.4 K/UL (ref 1.8–8)
NEUTS SEG NFR BLD: 63 % (ref 32–75)
NITRITE UR QL STRIP.AUTO: NEGATIVE
NRBC # BLD: 0 K/UL (ref 0–0.01)
NRBC BLD-RTO: 0 PER 100 WBC
PH UR STRIP: 6 (ref 5–8)
PLATELET # BLD AUTO: 209 K/UL (ref 150–400)
PMV BLD AUTO: 11.7 FL (ref 8.9–12.9)
POTASSIUM SERPL-SCNC: 4.4 MMOL/L (ref 3.5–5.1)
PROT SERPL-MCNC: 6.5 G/DL (ref 6.4–8.3)
PROT UR STRIP-MCNC: NEGATIVE MG/DL
RBC # BLD AUTO: 4.17 M/UL (ref 3.8–5.2)
RBC #/AREA URNS HPF: NORMAL /HPF
SODIUM SERPL-SCNC: 137 MMOL/L (ref 136–145)
SP GR UR REFRACTOMETRY: 1.02 (ref 1–1.03)
SPECIMEN HOLD: NORMAL
TROPONIN I BLD-MCNC: <0.04 NG/ML (ref 0–0.08)
TSH SERPL DL<=0.05 MIU/L-ACNC: 0.88 UIU/ML (ref 0.27–4.2)
UROBILINOGEN UR QL STRIP.AUTO: 0.2 EU/DL (ref 0.2–1)
WBC # BLD AUTO: 5.4 K/UL (ref 3.6–11)
WBC URNS QL MICRO: NORMAL /HPF (ref 0–4)

## 2023-06-29 PROCEDURE — 93005 ELECTROCARDIOGRAM TRACING: CPT | Performed by: EMERGENCY MEDICINE

## 2023-06-29 PROCEDURE — 1123F ACP DISCUSS/DSCN MKR DOCD: CPT | Performed by: PHYSICIAN ASSISTANT

## 2023-06-29 PROCEDURE — 99214 OFFICE O/P EST MOD 30 MIN: CPT | Performed by: PHYSICIAN ASSISTANT

## 2023-06-29 PROCEDURE — 83735 ASSAY OF MAGNESIUM: CPT

## 2023-06-29 PROCEDURE — 81001 URINALYSIS AUTO W/SCOPE: CPT

## 2023-06-29 PROCEDURE — 3074F SYST BP LT 130 MM HG: CPT | Performed by: PHYSICIAN ASSISTANT

## 2023-06-29 PROCEDURE — 3078F DIAST BP <80 MM HG: CPT | Performed by: PHYSICIAN ASSISTANT

## 2023-06-29 PROCEDURE — 6360000002 HC RX W HCPCS: Performed by: INTERNAL MEDICINE

## 2023-06-29 PROCEDURE — 80053 COMPREHEN METABOLIC PANEL: CPT

## 2023-06-29 PROCEDURE — 84443 ASSAY THYROID STIM HORMONE: CPT

## 2023-06-29 PROCEDURE — 99285 EMERGENCY DEPT VISIT HI MDM: CPT

## 2023-06-29 PROCEDURE — G0378 HOSPITAL OBSERVATION PER HR: HCPCS

## 2023-06-29 PROCEDURE — 85025 COMPLETE CBC W/AUTO DIFF WBC: CPT

## 2023-06-29 PROCEDURE — 96372 THER/PROPH/DIAG INJ SC/IM: CPT

## 2023-06-29 PROCEDURE — 36415 COLL VENOUS BLD VENIPUNCTURE: CPT

## 2023-06-29 PROCEDURE — 84484 ASSAY OF TROPONIN QUANT: CPT

## 2023-06-29 RX ORDER — ONDANSETRON 2 MG/ML
4 INJECTION INTRAMUSCULAR; INTRAVENOUS EVERY 6 HOURS PRN
Status: DISCONTINUED | OUTPATIENT
Start: 2023-06-29 | End: 2023-06-29 | Stop reason: HOSPADM

## 2023-06-29 RX ORDER — SODIUM CHLORIDE 0.9 % (FLUSH) 0.9 %
5-40 SYRINGE (ML) INJECTION PRN
Status: ACTIVE | OUTPATIENT
Start: 2023-06-29

## 2023-06-29 RX ORDER — ACETAMINOPHEN 325 MG/1
650 TABLET ORAL EVERY 6 HOURS PRN
Status: DISCONTINUED | OUTPATIENT
Start: 2023-06-29 | End: 2023-06-29 | Stop reason: HOSPADM

## 2023-06-29 RX ORDER — POLYETHYLENE GLYCOL 3350 17 G/17G
17 POWDER, FOR SOLUTION ORAL DAILY PRN
Status: DISCONTINUED | OUTPATIENT
Start: 2023-06-29 | End: 2023-06-29 | Stop reason: HOSPADM

## 2023-06-29 RX ORDER — ACETAMINOPHEN 325 MG/1
650 TABLET ORAL EVERY 6 HOURS PRN
Status: ACTIVE | OUTPATIENT
Start: 2023-06-29

## 2023-06-29 RX ORDER — ACETAMINOPHEN 650 MG/1
650 SUPPOSITORY RECTAL EVERY 6 HOURS PRN
Status: DISCONTINUED | OUTPATIENT
Start: 2023-06-29 | End: 2023-06-29 | Stop reason: HOSPADM

## 2023-06-29 RX ORDER — POLYETHYLENE GLYCOL 3350 17 G/17G
17 POWDER, FOR SOLUTION ORAL DAILY PRN
Status: ACTIVE | OUTPATIENT
Start: 2023-06-29

## 2023-06-29 RX ORDER — SODIUM CHLORIDE 0.9 % (FLUSH) 0.9 %
5-40 SYRINGE (ML) INJECTION EVERY 12 HOURS SCHEDULED
Status: ACTIVE | OUTPATIENT
Start: 2023-06-29

## 2023-06-29 RX ORDER — SODIUM CHLORIDE 0.9 % (FLUSH) 0.9 %
5-40 SYRINGE (ML) INJECTION PRN
Status: DISCONTINUED | OUTPATIENT
Start: 2023-06-29 | End: 2023-06-29 | Stop reason: HOSPADM

## 2023-06-29 RX ORDER — ONDANSETRON 2 MG/ML
4 INJECTION INTRAMUSCULAR; INTRAVENOUS EVERY 6 HOURS PRN
Status: ACTIVE | OUTPATIENT
Start: 2023-06-29

## 2023-06-29 RX ORDER — ONDANSETRON 4 MG/1
4 TABLET, ORALLY DISINTEGRATING ORAL EVERY 8 HOURS PRN
Status: ACTIVE | OUTPATIENT
Start: 2023-06-29

## 2023-06-29 RX ORDER — ENOXAPARIN SODIUM 100 MG/ML
40 INJECTION SUBCUTANEOUS DAILY
Status: DISCONTINUED | OUTPATIENT
Start: 2023-06-29 | End: 2023-06-29 | Stop reason: HOSPADM

## 2023-06-29 RX ORDER — ACETAMINOPHEN 650 MG/1
650 SUPPOSITORY RECTAL EVERY 6 HOURS PRN
Status: ACTIVE | OUTPATIENT
Start: 2023-06-29

## 2023-06-29 RX ORDER — SODIUM CHLORIDE 9 MG/ML
INJECTION, SOLUTION INTRAVENOUS PRN
Status: ACTIVE | OUTPATIENT
Start: 2023-06-29

## 2023-06-29 RX ORDER — SODIUM CHLORIDE 0.9 % (FLUSH) 0.9 %
5-40 SYRINGE (ML) INJECTION EVERY 12 HOURS SCHEDULED
Status: DISCONTINUED | OUTPATIENT
Start: 2023-06-29 | End: 2023-06-29 | Stop reason: HOSPADM

## 2023-06-29 RX ORDER — ONDANSETRON 4 MG/1
4 TABLET, ORALLY DISINTEGRATING ORAL EVERY 8 HOURS PRN
Status: DISCONTINUED | OUTPATIENT
Start: 2023-06-29 | End: 2023-06-29 | Stop reason: HOSPADM

## 2023-06-29 RX ORDER — SODIUM CHLORIDE 9 MG/ML
INJECTION, SOLUTION INTRAVENOUS PRN
Status: DISCONTINUED | OUTPATIENT
Start: 2023-06-29 | End: 2023-06-29 | Stop reason: HOSPADM

## 2023-06-29 RX ADMIN — ENOXAPARIN SODIUM 40 MG: 100 INJECTION SUBCUTANEOUS at 15:24

## 2023-06-29 SDOH — ECONOMIC STABILITY: FOOD INSECURITY: WITHIN THE PAST 12 MONTHS, THE FOOD YOU BOUGHT JUST DIDN'T LAST AND YOU DIDN'T HAVE MONEY TO GET MORE.: NEVER TRUE

## 2023-06-29 SDOH — ECONOMIC STABILITY: FOOD INSECURITY: WITHIN THE PAST 12 MONTHS, YOU WORRIED THAT YOUR FOOD WOULD RUN OUT BEFORE YOU GOT MONEY TO BUY MORE.: NEVER TRUE

## 2023-06-29 SDOH — ECONOMIC STABILITY: INCOME INSECURITY: HOW HARD IS IT FOR YOU TO PAY FOR THE VERY BASICS LIKE FOOD, HOUSING, MEDICAL CARE, AND HEATING?: NOT HARD AT ALL

## 2023-06-29 ASSESSMENT — ENCOUNTER SYMPTOMS
DIARRHEA: 0
WHEEZING: 0
VOMITING: 0
SORE THROAT: 0
SHORTNESS OF BREATH: 0
CONSTIPATION: 0
ABDOMINAL PAIN: 0
NAUSEA: 0
APNEA: 0

## 2023-06-29 ASSESSMENT — PAIN DESCRIPTION - ORIENTATION: ORIENTATION: LEFT;ANTERIOR

## 2023-06-29 ASSESSMENT — ANXIETY QUESTIONNAIRES
3. WORRYING TOO MUCH ABOUT DIFFERENT THINGS: 0
5. BEING SO RESTLESS THAT IT IS HARD TO SIT STILL: 0
4. TROUBLE RELAXING: 0
7. FEELING AFRAID AS IF SOMETHING AWFUL MIGHT HAPPEN: 0
6. BECOMING EASILY ANNOYED OR IRRITABLE: 0
2. NOT BEING ABLE TO STOP OR CONTROL WORRYING: 0
1. FEELING NERVOUS, ANXIOUS, OR ON EDGE: 3
GAD7 TOTAL SCORE: 3

## 2023-06-29 ASSESSMENT — PAIN DESCRIPTION - DESCRIPTORS: DESCRIPTORS: SORE

## 2023-06-29 ASSESSMENT — PATIENT HEALTH QUESTIONNAIRE - PHQ9
SUM OF ALL RESPONSES TO PHQ QUESTIONS 1-9: 0
1. LITTLE INTEREST OR PLEASURE IN DOING THINGS: 0
SUM OF ALL RESPONSES TO PHQ QUESTIONS 1-9: 0
2. FEELING DOWN, DEPRESSED OR HOPELESS: 0
SUM OF ALL RESPONSES TO PHQ QUESTIONS 1-9: 0
SUM OF ALL RESPONSES TO PHQ9 QUESTIONS 1 & 2: 0
SUM OF ALL RESPONSES TO PHQ QUESTIONS 1-9: 0

## 2023-06-29 ASSESSMENT — PAIN DESCRIPTION - LOCATION: LOCATION: KNEE

## 2023-06-29 ASSESSMENT — PAIN SCALES - GENERAL: PAINLEVEL_OUTOF10: 8

## 2023-06-29 ASSESSMENT — PAIN - FUNCTIONAL ASSESSMENT: PAIN_FUNCTIONAL_ASSESSMENT: 0-10

## 2023-06-30 ENCOUNTER — TELEPHONE (OUTPATIENT)
Age: 68
End: 2023-06-30

## 2023-06-30 LAB
EKG ATRIAL RATE: 50 BPM
EKG DIAGNOSIS: NORMAL
EKG P AXIS: 16 DEGREES
EKG P-R INTERVAL: 158 MS
EKG Q-T INTERVAL: 404 MS
EKG QRS DURATION: 80 MS
EKG QTC CALCULATION (BAZETT): 368 MS
EKG R AXIS: 15 DEGREES
EKG T AXIS: 34 DEGREES
EKG VENTRICULAR RATE: 50 BPM

## 2023-06-30 PROCEDURE — 93010 ELECTROCARDIOGRAM REPORT: CPT | Performed by: INTERNAL MEDICINE

## 2023-07-02 ENCOUNTER — TELEPHONE (OUTPATIENT)
Age: 68
End: 2023-07-02

## 2023-07-05 RX ORDER — ESCITALOPRAM OXALATE 20 MG/1
TABLET ORAL
Qty: 90 TABLET | Refills: 0 | Status: SHIPPED | OUTPATIENT
Start: 2023-07-05

## 2023-07-05 RX ORDER — TRAZODONE HYDROCHLORIDE 100 MG/1
TABLET ORAL
Qty: 90 TABLET | Refills: 0 | Status: SHIPPED | OUTPATIENT
Start: 2023-07-05

## 2023-07-12 NOTE — PATIENT INSTRUCTIONS

## 2023-07-13 ENCOUNTER — TELEPHONE (OUTPATIENT)
Age: 68
End: 2023-07-13

## 2023-07-13 ENCOUNTER — OFFICE VISIT (OUTPATIENT)
Age: 68
End: 2023-07-13
Payer: MEDICARE

## 2023-07-13 VITALS
WEIGHT: 142.2 LBS | BODY MASS INDEX: 26.85 KG/M2 | SYSTOLIC BLOOD PRESSURE: 132 MMHG | DIASTOLIC BLOOD PRESSURE: 80 MMHG | OXYGEN SATURATION: 94 % | HEART RATE: 56 BPM | HEIGHT: 61 IN

## 2023-07-13 DIAGNOSIS — E11.69 TYPE 2 DIABETES MELLITUS WITH OTHER SPECIFIED COMPLICATION, WITHOUT LONG-TERM CURRENT USE OF INSULIN (HCC): ICD-10-CM

## 2023-07-13 DIAGNOSIS — I10 HTN (HYPERTENSION), MALIGNANT: ICD-10-CM

## 2023-07-13 DIAGNOSIS — R00.1 BRADYCARDIA: Primary | ICD-10-CM

## 2023-07-13 PROCEDURE — 1123F ACP DISCUSS/DSCN MKR DOCD: CPT | Performed by: SPECIALIST

## 2023-07-13 PROCEDURE — 99204 OFFICE O/P NEW MOD 45 MIN: CPT | Performed by: SPECIALIST

## 2023-07-13 PROCEDURE — 3075F SYST BP GE 130 - 139MM HG: CPT | Performed by: SPECIALIST

## 2023-07-13 PROCEDURE — 3079F DIAST BP 80-89 MM HG: CPT | Performed by: SPECIALIST

## 2023-07-13 PROCEDURE — 3044F HG A1C LEVEL LT 7.0%: CPT | Performed by: SPECIALIST

## 2023-07-13 NOTE — TELEPHONE ENCOUNTER
Dr Black Prater pt needs 14 day loop for [facility] [place]. Had monitor sent previously to her but pt states not received.

## 2023-07-13 NOTE — PROGRESS NOTES
Judah Phillips is a 79 y.o. female    had concerns including Follow-up (ER 2023/Shahab). Vitals:    23 0944   BP: 132/80   Site: Left Upper Arm   Position: Sitting   Pulse: 56   SpO2: 94%   Weight: 142 lb 3.2 oz (64.5 kg)   Height: 5' 1\" (1.549 m)        Chest pain No    SOB some SOB     Dizziness some dizziness still at times     Swelling No    Refills No    Covid Vaccinated No      1. Have you been to the ER, urgent care clinic since your last visit? Hospitalized since your last visit? ED     2. Have you seen or consulted any other health care providers outside of the [address] [place] [address] Avenue since your last visit? Include any pap smears or colon screening. No    NAME Judah Phillips         1955      MRN    [id number]      LAST OFFICE APPOINTMENT: Visit date not found     DIAGNOSIS    ICD-10-CM    1. Bradycardia  R00.1       2. HTN (hypertension), malignant  I10       3. Type 2 diabetes mellitus with other specified complication, without long-term current use of insulin (HCC)  E11.69           HOME MEDICATION  Current Outpatient Medications   Medication Sig    UNABLE TO FIND 100 mg Ar bacrose before each meal    escitalopram (LEXAPRO) 20 MG tablet TAKE 1 TABLET BY MOUTH  DAILY    traZODone (DESYREL) 100 MG tablet TAKE 1 TABLET BY MOUTH AT  NIGHT    acetaminophen (TYLENOL) 500 MG tablet Take by mouth every 6 hours as needed    busPIRone (BUSPAR) 7.5 MG tablet Take by mouth 2 times daily as needed    diclofenac sodium (VOLTAREN) 1 % GEL APPLY 2 GRAMS TOPICALLY TO AFFECTED AREA EVERY 6 HOURS FOR 23 DAYS    Dulaglutide (TRULICITY) 3.13 PA/2.5SK SOPN     Ferrous Sulfate 324 MG TBEC Take by mouth 2 times daily (with meals)    glucagon 1 MG injection INJECT 0.5 MG SUBCUTANEOUSLY AS NEEDED FOR  HYPOGLYCEMIA. No current facility-administered medications for this visit.      Facility-Administered Medications Ordered in Other Visits   Medication Dose Route Frequency    sodium chloride flush 0.9 %
[id number] [phone] / [phone] Fax

## 2023-07-14 ENCOUNTER — ANCILLARY PROCEDURE (OUTPATIENT)
Age: 68
End: 2023-07-14
Payer: MEDICARE

## 2023-07-14 DIAGNOSIS — R00.1 BRADYCARDIA: ICD-10-CM

## 2023-07-14 PROCEDURE — 93229 REMOTE 30 DAY ECG TECH SUPP: CPT

## 2023-07-14 PROCEDURE — 93228 REMOTE 30 DAY ECG REV/REPORT: CPT | Performed by: SPECIALIST

## 2023-07-14 NOTE — TELEPHONE ENCOUNTER
Enrolled with [employer] - Ordered and being shipped to patient's home address on file. ETA within 7-14 Business Days.

## 2023-07-17 ENCOUNTER — TELEPHONE (OUTPATIENT)
Age: 68
End: 2023-07-17

## 2023-07-17 NOTE — TELEPHONE ENCOUNTER
Pt called and stated she haven't receive heart as of yet, stated Dr. Devon Ramos advise    [phone]

## 2023-07-19 NOTE — TELEPHONE ENCOUNTER
Called pt & verified she received her [employer] 14 day loop that was ordered by Dr Marco Craig.  Informed her instructions in the box & ph # incase she has any questions.

## 2023-08-02 NOTE — RESULT ENCOUNTER NOTE
Your Holter monitor/loop recorder shows no abnormal heart rhythms of any significance and this is great news. I hope all is well.     All the best,    [facility]

## 2023-08-04 ENCOUNTER — ANCILLARY PROCEDURE (OUTPATIENT)
Age: 68
End: 2023-08-04
Payer: MEDICARE

## 2023-08-04 VITALS
WEIGHT: 142 LBS | SYSTOLIC BLOOD PRESSURE: 126 MMHG | DIASTOLIC BLOOD PRESSURE: 70 MMHG | BODY MASS INDEX: 26.81 KG/M2 | HEIGHT: 61 IN | HEART RATE: 60 BPM

## 2023-08-04 VITALS
SYSTOLIC BLOOD PRESSURE: 126 MMHG | WEIGHT: 142 LBS | HEIGHT: 61 IN | DIASTOLIC BLOOD PRESSURE: 78 MMHG | BODY MASS INDEX: 26.81 KG/M2

## 2023-08-04 DIAGNOSIS — R00.1 BRADYCARDIA: ICD-10-CM

## 2023-08-04 DIAGNOSIS — I10 HTN (HYPERTENSION), MALIGNANT: ICD-10-CM

## 2023-08-04 DIAGNOSIS — E11.69 TYPE 2 DIABETES MELLITUS WITH OTHER SPECIFIED COMPLICATION, WITHOUT LONG-TERM CURRENT USE OF INSULIN (HCC): ICD-10-CM

## 2023-08-04 LAB
ECHO AO ASC DIAM: 3.2 CM
ECHO AO ASCENDING AORTA INDEX: 1.96 CM/M2
ECHO AO ROOT DIAM: 3.4 CM
ECHO AO ROOT INDEX: 2.09 CM/M2
ECHO AV AREA PEAK VELOCITY: 2.2 CM2
ECHO AV AREA VTI: 2.2 CM2
ECHO AV AREA/BSA PEAK VELOCITY: 1.3 CM2/M2
ECHO AV AREA/BSA VTI: 1.3 CM2/M2
ECHO AV MEAN GRADIENT: 3 MMHG
ECHO AV MEAN VELOCITY: 0.8 M/S
ECHO AV PEAK GRADIENT: 5 MMHG
ECHO AV PEAK VELOCITY: 1.1 M/S
ECHO AV VELOCITY RATIO: 0.73
ECHO AV VTI: 25 CM
ECHO BSA: 1.66 M2
ECHO BSA: 1.66 M2
ECHO LA DIAMETER INDEX: 2.33 CM/M2
ECHO LA DIAMETER: 3.8 CM
ECHO LA TO AORTIC ROOT RATIO: 1.12
ECHO LA VOL 2C: 63 ML (ref 22–52)
ECHO LA VOL 4C: 65 ML (ref 22–52)
ECHO LA VOL BP: 64 ML (ref 22–52)
ECHO LA VOL/BSA BIPLANE: 39 ML/M2 (ref 16–34)
ECHO LA VOLUME AREA LENGTH: 70 ML
ECHO LA VOLUME INDEX A2C: 39 ML/M2 (ref 16–34)
ECHO LA VOLUME INDEX A4C: 40 ML/M2 (ref 16–34)
ECHO LA VOLUME INDEX AREA LENGTH: 43 ML/M2 (ref 16–34)
ECHO LV E' LATERAL VELOCITY: 9 CM/S
ECHO LV E' SEPTAL VELOCITY: 7 CM/S
ECHO LV EDV A2C: 102 ML
ECHO LV EDV A4C: 84 ML
ECHO LV EDV BP: 93 ML (ref 56–104)
ECHO LV EDV INDEX A4C: 52 ML/M2
ECHO LV EDV INDEX BP: 57 ML/M2
ECHO LV EDV NDEX A2C: 63 ML/M2
ECHO LV EJECTION FRACTION A2C: 63 %
ECHO LV EJECTION FRACTION A4C: 60 %
ECHO LV EJECTION FRACTION BIPLANE: 60 % (ref 55–100)
ECHO LV ESV A2C: 37 ML
ECHO LV ESV A4C: 34 ML
ECHO LV ESV BP: 37 ML (ref 19–49)
ECHO LV ESV INDEX A2C: 23 ML/M2
ECHO LV ESV INDEX A4C: 21 ML/M2
ECHO LV ESV INDEX BP: 23 ML/M2
ECHO LV FRACTIONAL SHORTENING: 47 % (ref 28–44)
ECHO LV INTERNAL DIMENSION DIASTOLE INDEX: 2.76 CM/M2
ECHO LV INTERNAL DIMENSION DIASTOLIC: 4.5 CM (ref 3.9–5.3)
ECHO LV INTERNAL DIMENSION SYSTOLIC INDEX: 1.47 CM/M2
ECHO LV INTERNAL DIMENSION SYSTOLIC: 2.4 CM
ECHO LV IVSD: 0.8 CM (ref 0.6–0.9)
ECHO LV MASS 2D: 95.7 G (ref 67–162)
ECHO LV MASS INDEX 2D: 58.7 G/M2 (ref 43–95)
ECHO LV POSTERIOR WALL DIASTOLIC: 0.6 CM (ref 0.6–0.9)
ECHO LV RELATIVE WALL THICKNESS RATIO: 0.27
ECHO LVOT AREA: 3.1 CM2
ECHO LVOT AV VTI INDEX: 0.73
ECHO LVOT DIAM: 2 CM
ECHO LVOT MEAN GRADIENT: 1 MMHG
ECHO LVOT PEAK GRADIENT: 2 MMHG
ECHO LVOT PEAK VELOCITY: 0.8 M/S
ECHO LVOT STROKE VOLUME INDEX: 35.3 ML/M2
ECHO LVOT SV: 57.5 ML
ECHO LVOT VTI: 18.3 CM
ECHO MV A VELOCITY: 0.84 M/S
ECHO MV AREA PHT: 4.2 CM2
ECHO MV E DECELERATION TIME (DT): 182.1 MS
ECHO MV E VELOCITY: 0.68 M/S
ECHO MV E/A RATIO: 0.81
ECHO MV E/E' LATERAL: 7.56
ECHO MV E/E' RATIO (AVERAGED): 8.63
ECHO MV E/E' SEPTAL: 9.71
ECHO MV PRESSURE HALF TIME (PHT): 52.8 MS
ECHO RV FREE WALL PEAK S': 11 CM/S
ECHO RV TAPSE: 2.5 CM (ref 1.7–?)
ECHO TV REGURGITANT MAX VELOCITY: 2.18 M/S
ECHO TV REGURGITANT PEAK GRADIENT: 19 MMHG
STRESS ANGINA INDEX: 0
STRESS BASELINE DIAS BP: 70 MMHG
STRESS BASELINE HR: 60 BPM
STRESS BASELINE ST DEPRESSION: 0 MM
STRESS BASELINE SYS BP: 126 MMHG
STRESS ESTIMATED WORKLOAD: 7 METS
STRESS EXERCISE DUR MIN: 4 MIN
STRESS EXERCISE DUR SEC: 26 SEC
STRESS O2 SAT PEAK: 98 %
STRESS O2 SAT REST: 97 %
STRESS PEAK DIAS BP: 84 MMHG
STRESS PEAK SYS BP: 160 MMHG
STRESS PERCENT HR ACHIEVED: 92 %
STRESS POST PEAK HR: 142 BPM
STRESS RATE PRESSURE PRODUCT: NORMAL BPM*MMHG
STRESS TARGET HR: 154 BPM

## 2023-08-04 PROCEDURE — 93018 CV STRESS TEST I&R ONLY: CPT | Performed by: SPECIALIST

## 2023-08-04 PROCEDURE — 93306 TTE W/DOPPLER COMPLETE: CPT

## 2023-08-04 PROCEDURE — 93306 TTE W/DOPPLER COMPLETE: CPT | Performed by: SPECIALIST

## 2023-08-04 PROCEDURE — 93017 CV STRESS TEST TRACING ONLY: CPT

## 2023-08-05 NOTE — RESULT ENCOUNTER NOTE
Your echocardiogram reveals normal heart function and this is great news.      All the best,    Yunior Gonzales

## 2023-08-07 ENCOUNTER — PATIENT MESSAGE (OUTPATIENT)
Dept: OTHER | Facility: CLINIC | Age: 68
End: 2023-08-07

## 2023-08-17 ENCOUNTER — TELEMEDICINE (OUTPATIENT)
Age: 68
End: 2023-08-17
Payer: MEDICARE

## 2023-08-17 DIAGNOSIS — M54.50 LUMBAR PAIN: Primary | ICD-10-CM

## 2023-08-17 PROCEDURE — 99213 OFFICE O/P EST LOW 20 MIN: CPT | Performed by: FAMILY MEDICINE

## 2023-08-17 PROCEDURE — 1123F ACP DISCUSS/DSCN MKR DOCD: CPT | Performed by: FAMILY MEDICINE

## 2023-08-17 RX ORDER — PREDNISONE 10 MG/1
TABLET ORAL
Qty: 1 EACH | Refills: 0 | Status: SHIPPED | OUTPATIENT
Start: 2023-08-17

## 2023-08-17 ASSESSMENT — PATIENT HEALTH QUESTIONNAIRE - PHQ9
SUM OF ALL RESPONSES TO PHQ QUESTIONS 1-9: 0
1. LITTLE INTEREST OR PLEASURE IN DOING THINGS: 0
SUM OF ALL RESPONSES TO PHQ QUESTIONS 1-9: 0
SUM OF ALL RESPONSES TO PHQ9 QUESTIONS 1 & 2: 0
2. FEELING DOWN, DEPRESSED OR HOPELESS: 0

## 2023-08-17 NOTE — PROGRESS NOTES
Consent: Giselle Juarez, who was seen by synchronous (real-time) audio-video technology, and/or her healthcare decision maker, is aware that this patient-initiated, Telehealth encounter on 8/17/2023 is a billable service, with coverage as determined by her insurance carrier. She is aware that she may receive a bill and has provided verbal consent to proceed: YES-Consent obtained within past 12 months  712    Prior to Admission medications    Medication Sig Start Date End Date Taking? Authorizing Provider   predniSONE 10 MG (48) TBPK Take as directed 12 day pack 8/17/23  Yes Kerry Soto MD   UNABLE TO FIND 100 mg Ar bacrose before each meal    Historical Provider, MD   escitalopram (LEXAPRO) 20 MG tablet TAKE 1 TABLET BY MOUTH  DAILY 7/5/23   Lucero Hall MD   traZODone (DESYREL) 100 MG tablet TAKE 1 TABLET BY MOUTH AT  NIGHT 7/5/23   Lucero Hall MD   acetaminophen (TYLENOL) 500 MG tablet Take by mouth every 6 hours as needed 9/12/22   Ar Automatic Reconciliation   busPIRone (BUSPAR) 7.5 MG tablet Take by mouth 2 times daily as needed 5/19/22   Ar Automatic Reconciliation   diclofenac sodium (VOLTAREN) 1 % GEL APPLY 2 GRAMS TOPICALLY TO AFFECTED AREA EVERY 6 HOURS FOR 23 DAYS 12/19/22   Ar Automatic Reconciliation   Dulaglutide (TRULICITY) 2.86 BX/8.6OY SOPN  5/24/22   Ar Automatic Reconciliation   Ferrous Sulfate 324 MG TBEC Take by mouth 2 times daily (with meals)    Ar Automatic Reconciliation   glucagon 1 MG injection INJECT 0.5 MG SUBCUTANEOUSLY AS NEEDED FOR  HYPOGLYCEMIA. 8/30/22   Ar Automatic Reconciliation     Allergies   Allergen Reactions    Nsaids Other (See Comments)     Gastric bypass           Assessment & Plan:       ICD-10-CM    1. Lumbar pain  M54.50    Patient is having 1 week history of increased low back pain with sciatica and some knee issues as well.   Will place on Sterapred Dosepak for 12 days she is tolerant of this in the past.       Time was used for level of billing: no

## 2023-08-30 RX ORDER — METHOCARBAMOL 750 MG/1
750 TABLET, FILM COATED ORAL 4 TIMES DAILY
Qty: 40 TABLET | Refills: 0 | Status: SHIPPED | OUTPATIENT
Start: 2023-08-30 | End: 2023-08-30 | Stop reason: SDUPTHER

## 2023-08-30 RX ORDER — METHOCARBAMOL 750 MG/1
750 TABLET, FILM COATED ORAL 4 TIMES DAILY
Qty: 40 TABLET | Refills: 0 | Status: SHIPPED | OUTPATIENT
Start: 2023-08-30 | End: 2023-09-09

## 2023-09-19 DIAGNOSIS — M54.50 LUMBAR PAIN: Primary | ICD-10-CM

## 2023-09-28 RX ORDER — TRAZODONE HYDROCHLORIDE 100 MG/1
TABLET ORAL
Qty: 90 TABLET | Refills: 3 | Status: SHIPPED | OUTPATIENT
Start: 2023-09-28

## 2023-10-03 ENCOUNTER — TELEPHONE (OUTPATIENT)
Age: 68
End: 2023-10-03

## 2023-10-03 RX ORDER — ESCITALOPRAM OXALATE 20 MG/1
20 TABLET ORAL DAILY
Qty: 90 TABLET | Refills: 0 | Status: SHIPPED | OUTPATIENT
Start: 2023-10-03

## 2023-10-03 NOTE — TELEPHONE ENCOUNTER
We received a fax refill request for [facility]. Please escribe Escitalopram Tab to their pharmacy. The pharmacy is correct in the chart and they are requesting a ? day supply.

## 2023-10-19 DIAGNOSIS — E55.9 VITAMIN D DEFICIENCY, UNSPECIFIED: ICD-10-CM

## 2023-10-19 DIAGNOSIS — D64.9 ANEMIA, UNSPECIFIED TYPE: ICD-10-CM

## 2023-10-19 DIAGNOSIS — Z98.84 BARIATRIC SURGERY STATUS: Primary | ICD-10-CM

## 2023-10-19 DIAGNOSIS — E78.2 MIXED HYPERLIPIDEMIA: ICD-10-CM

## 2023-11-24 ENCOUNTER — TELEMEDICINE (OUTPATIENT)
Age: 68
End: 2023-11-24
Payer: MEDICARE

## 2023-11-24 DIAGNOSIS — U07.1 ACUTE COVID-19: Primary | ICD-10-CM

## 2023-11-24 PROCEDURE — 99213 OFFICE O/P EST LOW 20 MIN: CPT | Performed by: FAMILY MEDICINE

## 2023-11-24 PROCEDURE — 1123F ACP DISCUSS/DSCN MKR DOCD: CPT | Performed by: FAMILY MEDICINE

## 2023-11-24 NOTE — PROGRESS NOTES
Progress Note    she is a 79y.o. year old female who presents for evaluation. Subjective:     Patient positive for COVID-19 states she started symptoms last Saturday she is therefore outside of the treatment window of 5 days since today is day 7. Her  was unfortunately hospitalized last week with COVID. She reports a history of asthma but has not had any chest pain no shortness of breath. Has body aches and headache. She does have a history of gastric bypass 11 years ago she is only using Tylenol 1000 mg every 8 hours discussed this is about max dose not to go higher. We did discuss a trial of ibuprofen to see if this helps and given the history of gastric bypass she could try 1 tablet with food and if it upsets her stomach stop but if not she can take up to 600 mg every 8 hours with food to help with her headache and body ache that the Tylenol is not doing much for her. Reviewed PmHx, RxHx, FmHx, SocHx, AllgHx and updated and dated in the chart. Review of Systems - negative except as listed above in the HPI    Objective: There were no vitals filed for this visit.     Current Outpatient Medications   Medication Sig    escitalopram (LEXAPRO) 20 MG tablet Take 1 tablet by mouth daily    traZODone (DESYREL) 100 MG tablet TAKE 1 TABLET BY MOUTH AT NIGHT    UNABLE TO FIND 100 mg Ar bacrose before each meal    acetaminophen (TYLENOL) 500 MG tablet Take by mouth every 6 hours as needed    busPIRone (BUSPAR) 7.5 MG tablet Take by mouth 2 times daily as needed    diclofenac sodium (VOLTAREN) 1 % GEL APPLY 2 GRAMS TOPICALLY TO AFFECTED AREA EVERY 6 HOURS FOR 23 DAYS    Dulaglutide (TRULICITY) 7.86 WH/1.6BN SOPN     Ferrous Sulfate 324 MG TBEC Take by mouth 2 times daily (with meals)    glucagon 1 MG injection INJECT 0.5 MG SUBCUTANEOUSLY AS NEEDED FOR  HYPOGLYCEMIA.    predniSONE 10 MG (48) TBPK Take as directed 12 day pack (Patient not taking: Reported on 11/24/2023)     No current

## 2023-11-30 RX ORDER — ESCITALOPRAM OXALATE 20 MG/1
20 TABLET ORAL DAILY
Qty: 90 TABLET | Refills: 3 | Status: SHIPPED | OUTPATIENT
Start: 2023-11-30

## 2023-12-18 ENCOUNTER — TELEPHONE (OUTPATIENT)
Age: 68
End: 2023-12-18

## 2024-01-26 ENCOUNTER — OFFICE VISIT (OUTPATIENT)
Age: 69
End: 2024-01-26

## 2024-01-26 VITALS
WEIGHT: 152.4 LBS | DIASTOLIC BLOOD PRESSURE: 64 MMHG | BODY MASS INDEX: 28.77 KG/M2 | OXYGEN SATURATION: 96 % | SYSTOLIC BLOOD PRESSURE: 115 MMHG | HEIGHT: 61 IN | TEMPERATURE: 97.5 F | HEART RATE: 72 BPM

## 2024-01-26 DIAGNOSIS — E55.9 VITAMIN D DEFICIENCY: ICD-10-CM

## 2024-01-26 DIAGNOSIS — E16.2 HYPOGLYCEMIA, UNSPECIFIED: Primary | ICD-10-CM

## 2024-01-26 DIAGNOSIS — D64.9 ANEMIA, UNSPECIFIED TYPE: ICD-10-CM

## 2024-01-26 DIAGNOSIS — Z98.84 BARIATRIC SURGERY STATUS: ICD-10-CM

## 2024-01-26 LAB
ALBUMIN SERPL-MCNC: 4 G/DL (ref 3.5–5)
ALBUMIN/GLOB SERPL: 1.4 (ref 1.1–2.2)
ALP SERPL-CCNC: 64 U/L (ref 45–117)
ALT SERPL-CCNC: 66 U/L (ref 12–78)
ANION GAP SERPL CALC-SCNC: 6 MMOL/L (ref 5–15)
AST SERPL-CCNC: 60 U/L (ref 15–37)
BILIRUB SERPL-MCNC: 0.6 MG/DL (ref 0.2–1)
BUN SERPL-MCNC: 26 MG/DL (ref 6–20)
BUN/CREAT SERPL: 47 (ref 12–20)
CALCIUM SERPL-MCNC: 9.3 MG/DL (ref 8.5–10.1)
CHLORIDE SERPL-SCNC: 107 MMOL/L (ref 97–108)
CO2 SERPL-SCNC: 23 MMOL/L (ref 21–32)
CREAT SERPL-MCNC: 0.55 MG/DL (ref 0.55–1.02)
GLOBULIN SER CALC-MCNC: 2.8 G/DL (ref 2–4)
GLUCOSE SERPL-MCNC: 92 MG/DL (ref 65–100)
HBA1C MFR BLD: 5.4 %
POTASSIUM SERPL-SCNC: 4.6 MMOL/L (ref 3.5–5.1)
PROT SERPL-MCNC: 6.8 G/DL (ref 6.4–8.2)
SODIUM SERPL-SCNC: 136 MMOL/L (ref 136–145)

## 2024-01-26 RX ORDER — DULAGLUTIDE 1.5 MG/.5ML
INJECTION, SOLUTION SUBCUTANEOUS
Qty: 6 ML | Refills: 1
Start: 2024-01-26

## 2024-01-26 RX ORDER — BLOOD PRESSURE TEST KIT
KIT MISCELLANEOUS
Qty: 100 EACH | Refills: 3 | Status: SHIPPED | OUTPATIENT
Start: 2024-01-26

## 2024-01-26 NOTE — PROGRESS NOTES
Val Alvarez is a 68 y.o. female here for   Chief Complaint   Patient presents with    Diabetes     hypogylcemia       1. Have you been to the ER, urgent care clinic since your last visit?  Hospitalized since your last visit? -no    2. Have you seen or consulted any other health care providers outside of the [facility] since your last visit?  Include any pap smears or colon screening.-no

## 2024-01-26 NOTE — PROGRESS NOTES
[facility] DIABETES AND ENDOCRINOLOGY                Renee Llanes MD FACE          HISTORY OF PRESENT ILLNESS      Val Alvarez is a 68  y.o. female.       follow up visit after  Last  visit for  Hypoglycemias after  nov virtual visit  in  April 2023   She had a gap  of 9 months     is sick    She now needs continuation of dexcom   and missed her follow up visit with me   Due for sending the notes     Gained 11 lbs by my scale since  April 2023 ( she received prednisone   for right sided knee joint issue )       April 2023       [facility]  is giving  supply sam sensors now    She is in pain today , dislikes use of  NSAIDS because of her fear of developing ulcers          Nov 2022     She moved to [facility]  and has NOT gotten  dexcom sensors        June 2022      In the interim, she got DEXCOM gladly  But happened to change insurance carrier    Unable to get over the nausea from trulicity    She is doing well with dexcom - says he has  to not exceed 8 gm of carbs    Lost 3 lbs             August 2021    She is referred by pcp   Supposed to see Dr. Way    As patient required immediate help, she is changed to my schedule    s/p bariatric surgery  In  July 2013 @ [facility]   By Dr. Mosher    5 years after surgery  - she started  Noticing hypoglycemias    The hypoglycemias as happening more often and are going to as low as 45 mg    She is afraid that her  cannot be of help because of the stroke he had and is non verbal    She is on Sam 2 - she notices the discrepancy and hopes to get dexcom        Review of Systems    Right knee joint pain ( replacement  and  revision)        Physical Exam    Constitutional: She is oriented to person, place, and time. She appears well-developed and well-nourished.    Psychiatric: She has a normal mood and affect.     No new labs            ASSESSMENT and PLAN        1.  Hypoglycemia - s/p gastric bypass - reactive     She is in constant worry about \"

## 2024-01-26 NOTE — PATIENT INSTRUCTIONS
SPECIFIC INSTRUCTIONS BELOW           ACARBOSE 100  MG RIGHT BEFORE EACH MEAL          ON    trulicity   @  1.5 mg  a week  from pedrito [facility]         sagar  - FOR TRULICITY               -------------PAY ATTENTION TO THESE GENERAL INSTRUCTIONS -----------------      - The medications prescribed at this visit will not be available at pharmacy until 6 pm today        - your med list is not updated until the visit encounter is closed, so FOLLOW THE TYPED SPECIFIC INSTRUCTIONS  ABOVE     -ANY tests other than blood work, which you opt to do  outside the  [facility] imaging facilities, you are responsible for prior authorizations if  required    - health maintenance will not be updated  on AVS, so please ignore it       Results     *Normal results will not be notified by a phone call starting January 1 2024   *If you have an upcoming visit, the results will be discussed at the visit   *Please sign up for MY CHART if you want access to your lab and test results  *Abnormal results which require immediate attention will be notified by phone call   *Abnormal results which do not require immediate assistance will be notified in 1-2 weeks       Refills    -    have your pharmacy send us a refill request . Refills are done max for one year and a visit is a must before refills are extended    Follow up appointments -  highly encourage you to make it when you are checking out. We can accommodate you into the schedule based on your clinical situation, but not for extending refills beyond a year. Labs are important to give refills and it is important to get labs before the visit     Phone calls  -  Allow  24 hrs. for non-urgent calls to be returned  Prior authorization - It may take 2 to 4 weeks to process  Forms  -  FMLA, DMV etc., will take up to 2 weeks to process  Cancellations - please notify the office 2 days in advance   Samples  - will only be dispensed at visits       If not showing up for the appointment and/ or

## 2024-01-27 LAB
25(OH)D3 SERPL-MCNC: 19.5 NG/ML (ref 30–100)
BASOPHILS # BLD: 0.1 K/UL (ref 0–0.1)
BASOPHILS NFR BLD: 1 % (ref 0–1)
CALCIUM SERPL-MCNC: 8.6 MG/DL (ref 8.5–10.1)
CREAT UR-MCNC: 93 MG/DL
DIFFERENTIAL METHOD BLD: ABNORMAL
EOSINOPHIL # BLD: 0.1 K/UL (ref 0–0.4)
EOSINOPHIL NFR BLD: 2 % (ref 0–7)
ERYTHROCYTE [DISTWIDTH] IN BLOOD BY AUTOMATED COUNT: 14.9 % (ref 11.5–14.5)
HCT VFR BLD AUTO: 40.5 % (ref 35–47)
HGB BLD-MCNC: 13.5 G/DL (ref 11.5–16)
IMM GRANULOCYTES # BLD AUTO: 0 K/UL (ref 0–0.04)
IMM GRANULOCYTES NFR BLD AUTO: 0 % (ref 0–0.5)
LYMPHOCYTES # BLD: 1.6 K/UL (ref 0.8–3.5)
LYMPHOCYTES NFR BLD: 28 % (ref 12–49)
MCH RBC QN AUTO: 30.7 PG (ref 26–34)
MCHC RBC AUTO-ENTMCNC: 33.3 G/DL (ref 30–36.5)
MCV RBC AUTO: 92 FL (ref 80–99)
MICROALBUMIN UR-MCNC: 1.2 MG/DL
MICROALBUMIN/CREAT UR-RTO: 13 MG/G (ref 0–30)
MONOCYTES # BLD: 0.4 K/UL (ref 0–1)
MONOCYTES NFR BLD: 7 % (ref 5–13)
NEUTS SEG # BLD: 3.5 K/UL (ref 1.8–8)
NEUTS SEG NFR BLD: 62 % (ref 32–75)
NRBC # BLD: 0 K/UL (ref 0–0.01)
NRBC BLD-RTO: 0 PER 100 WBC
PLATELET # BLD AUTO: 194 K/UL (ref 150–400)
PTH-INTACT SERPL-MCNC: 60 PG/ML (ref 18.4–88)
RBC # BLD AUTO: 4.4 M/UL (ref 3.8–5.2)
VIT B12 SERPL-MCNC: 274 PG/ML (ref 193–986)
WBC # BLD AUTO: 5.6 K/UL (ref 3.6–11)

## 2024-01-28 DIAGNOSIS — Z98.84 BARIATRIC SURGERY STATUS: ICD-10-CM

## 2024-01-28 DIAGNOSIS — E55.9 VITAMIN D DEFICIENCY: Primary | ICD-10-CM

## 2024-01-28 DIAGNOSIS — E53.8 DEFICIENCY OF OTHER SPECIFIED B GROUP VITAMINS: ICD-10-CM

## 2024-01-28 RX ORDER — CYANOCOBALAMIN (VITAMIN B-12) 500 MCG
TABLET ORAL
Qty: 90 TABLET | Refills: 3 | Status: SHIPPED | OUTPATIENT
Start: 2024-01-28

## 2024-01-28 RX ORDER — ERGOCALCIFEROL 1.25 MG/1
50000 CAPSULE ORAL WEEKLY
Qty: 12 CAPSULE | Refills: 3 | Status: SHIPPED | OUTPATIENT
Start: 2024-01-28

## 2024-02-14 ENCOUNTER — TELEPHONE (OUTPATIENT)
Age: 69
End: 2024-02-14

## 2024-02-14 NOTE — TELEPHONE ENCOUNTER
Where did you send prescription for Vitamin D (Drisdol) and B12 complex? Was it sent to [place] or [facility] on [address]?

## 2024-02-14 NOTE — TELEPHONE ENCOUNTER
Attempted to call. Unsuccessful. Left msg for Val Alvarez to give us a call back at the office. A callback number was left.     Wanted to tell her medication vit D and b12 order 1/28/24 sent to [facility]

## 2024-06-05 NOTE — PATIENT INSTRUCTIONS
SPECIFIC INSTRUCTIONS BELOW       Resume trulicity   0.13 mg  A week   ( use voucher )     trulicity  1.5 mg a week   ( second month  - use voucher  )      -------------PAY ATTENTION TO THESE GENERAL INSTRUCTIONS -----------------      - The medications prescribed at this visit will not be available at pharmacy until 6 pm       - YOUR MED LIST IS NOT UP TO DATE AS SOME CHANGES ARE BEING MADE AFTER THE VISIT - FOLLOW SPECIFIC INSTRUCTIONS  ABOVE     -ANY tests other than blood work, which you opt to do  outside the  [facility] facilities, you are responsible for prior authorizations if  required    - [address] [facility]- PLEASE IGNORE     Results     *Normal results will not be notified by a phone call starting January 1 2021   *If you have an upcoming visit, the results will be discussed at the visit   *Please sign up for MY CHART if you want access to your lab and test results  *Abnormal results which require immediate attention will be notified by phone call   *Abnormal results which do not require immediate assistance will be notified in 1-2 weeks       Refills    -    have your pharmacy send us a refill request . Refills are done max for one year and a visit is a must before refills are extended    Follow up appointments -  highly encourage you to make it when you are checking out. We can accommodate you into the schedule based on your clinical situation, but not for extending refills beyond a year. Labs are important to give refills and is important to get labs before the visit     Phone calls  -  Allow  24 hrs.  for non-urgent calls to be returned  Prior authorization - It may take 2-4 weeks to process  Forms  -  FMLA, DMV etc., will take up to 2 weeks to process  Cancellations - please notify the office 2 days in advance   Samples  - will only be dispensed at visits       If not showing for the appointments and cancelling appointments within 24 hours are kept track of and three  of such situations in  two consecutive years will likely be considered for termination from the practice    ------------------------------------------------------------------------------------------------------------------- 14

## 2024-07-01 ENCOUNTER — TELEPHONE (OUTPATIENT)
Age: 69
End: 2024-07-01

## 2024-07-01 NOTE — TELEPHONE ENCOUNTER
Spoke to the patient , she flushed her ear and she can hear now and is feeling better. I directed her to an Urgent Care facility if her symptoms worsen.

## 2024-07-01 NOTE — TELEPHONE ENCOUNTER
Pt wants to talk to the nurse about her ear she has put drops in and says it made her worse please advise no appts

## 2024-07-12 ENCOUNTER — TELEPHONE (OUTPATIENT)
Age: 69
End: 2024-07-12

## 2024-07-12 DIAGNOSIS — E16.2 HYPOGLYCEMIA, UNSPECIFIED: Primary | ICD-10-CM

## 2024-07-12 NOTE — TELEPHONE ENCOUNTER
Spoke to patient again she restarted the sensor.    Is not taking acarbose but will start.     Stated she has gained 30 lbs trying to keep her blood sugars up. Attached is an order for meter and supplies.

## 2024-07-12 NOTE — TELEPHONE ENCOUNTER
I need the Dexcom download, could not find it in Dr. Llanes's folder    Dexcom or any continuous glucose monitors can have false low blood glucose readings, she has to cross check with the fingerstick anytime if the blood glucose is less than 70 to confirm.       Is she on acarbose before meals?

## 2024-07-12 NOTE — TELEPHONE ENCOUNTER
Patient stated she needed to replace her Dexcom 7 sensor. Unable to give readings does not check with finger stick. However, patient reports glucose running between 40 and 250. Report down loaded up to 7-9-24.

## 2024-07-12 NOTE — TELEPHONE ENCOUNTER
Patient called in requesting to speak with nurse/Dr. Llanes about blood sugars.  She states they are all over the place.  Advise nurse would call her back and for her to have her blood sugar readings available.

## 2024-07-18 DIAGNOSIS — Z98.84 BARIATRIC SURGERY STATUS: Primary | ICD-10-CM

## 2024-07-18 DIAGNOSIS — E55.9 VITAMIN D DEFICIENCY: ICD-10-CM

## 2024-07-18 DIAGNOSIS — D64.9 ANEMIA, UNSPECIFIED TYPE: ICD-10-CM

## 2024-07-18 DIAGNOSIS — E16.2 HYPOGLYCEMIA, UNSPECIFIED: ICD-10-CM

## 2024-07-19 ENCOUNTER — LAB (OUTPATIENT)
Age: 69
End: 2024-07-19

## 2024-07-19 DIAGNOSIS — Z98.84 BARIATRIC SURGERY STATUS: ICD-10-CM

## 2024-07-19 DIAGNOSIS — D64.9 ANEMIA, UNSPECIFIED TYPE: ICD-10-CM

## 2024-07-19 DIAGNOSIS — E16.2 HYPOGLYCEMIA, UNSPECIFIED: ICD-10-CM

## 2024-07-19 DIAGNOSIS — E55.9 VITAMIN D DEFICIENCY: ICD-10-CM

## 2024-07-19 LAB
25(OH)D3 SERPL-MCNC: 31.1 NG/ML (ref 30–100)
ALBUMIN SERPL-MCNC: 3.5 G/DL (ref 3.5–5)
ALBUMIN/GLOB SERPL: 1.2 (ref 1.1–2.2)
ALP SERPL-CCNC: 78 U/L (ref 45–117)
ALT SERPL-CCNC: 26 U/L (ref 12–78)
ANION GAP SERPL CALC-SCNC: 7 MMOL/L (ref 5–15)
AST SERPL-CCNC: 23 U/L (ref 15–37)
BASOPHILS # BLD: 0.1 K/UL (ref 0–0.1)
BASOPHILS NFR BLD: 1 % (ref 0–1)
BILIRUB SERPL-MCNC: 0.4 MG/DL (ref 0.2–1)
BUN SERPL-MCNC: 21 MG/DL (ref 6–20)
BUN/CREAT SERPL: 39 (ref 12–20)
CALCIUM SERPL-MCNC: 8.8 MG/DL (ref 8.5–10.1)
CHLORIDE SERPL-SCNC: 108 MMOL/L (ref 97–108)
CHOLEST SERPL-MCNC: 160 MG/DL
CO2 SERPL-SCNC: 24 MMOL/L (ref 21–32)
CREAT SERPL-MCNC: 0.54 MG/DL (ref 0.55–1.02)
CREAT UR-MCNC: 40.8 MG/DL
DIFFERENTIAL METHOD BLD: ABNORMAL
EOSINOPHIL # BLD: 0.1 K/UL (ref 0–0.4)
EOSINOPHIL NFR BLD: 1 % (ref 0–7)
ERYTHROCYTE [DISTWIDTH] IN BLOOD BY AUTOMATED COUNT: 14.6 % (ref 11.5–14.5)
EST. AVERAGE GLUCOSE BLD GHB EST-MCNC: 97 MG/DL
GLOBULIN SER CALC-MCNC: 2.9 G/DL (ref 2–4)
GLUCOSE SERPL-MCNC: 123 MG/DL (ref 65–100)
HBA1C MFR BLD: 5 % (ref 4–5.6)
HCT VFR BLD AUTO: 35.5 % (ref 35–47)
HDLC SERPL-MCNC: 55 MG/DL
HDLC SERPL: 2.9 (ref 0–5)
HGB BLD-MCNC: 11.7 G/DL (ref 11.5–16)
IMM GRANULOCYTES # BLD AUTO: 0 K/UL (ref 0–0.04)
IMM GRANULOCYTES NFR BLD AUTO: 0 % (ref 0–0.5)
LDLC SERPL CALC-MCNC: 92.4 MG/DL (ref 0–100)
LYMPHOCYTES # BLD: 1.2 K/UL (ref 0.8–3.5)
LYMPHOCYTES NFR BLD: 19 % (ref 12–49)
MCH RBC QN AUTO: 30.5 PG (ref 26–34)
MCHC RBC AUTO-ENTMCNC: 33 G/DL (ref 30–36.5)
MCV RBC AUTO: 92.4 FL (ref 80–99)
MICROALBUMIN UR-MCNC: 0.96 MG/DL
MICROALBUMIN/CREAT UR-RTO: 24 MG/G (ref 0–30)
MONOCYTES # BLD: 0.3 K/UL (ref 0–1)
MONOCYTES NFR BLD: 5 % (ref 5–13)
NEUTS SEG # BLD: 4.5 K/UL (ref 1.8–8)
NEUTS SEG NFR BLD: 74 % (ref 32–75)
NRBC # BLD: 0 K/UL (ref 0–0.01)
NRBC BLD-RTO: 0 PER 100 WBC
PLATELET # BLD AUTO: 248 K/UL (ref 150–400)
PMV BLD AUTO: 12.5 FL (ref 8.9–12.9)
POTASSIUM SERPL-SCNC: 4.2 MMOL/L (ref 3.5–5.1)
PROT SERPL-MCNC: 6.4 G/DL (ref 6.4–8.2)
RBC # BLD AUTO: 3.84 M/UL (ref 3.8–5.2)
SODIUM SERPL-SCNC: 139 MMOL/L (ref 136–145)
TRIGL SERPL-MCNC: 63 MG/DL
VIT B12 SERPL-MCNC: 674 PG/ML (ref 193–986)
VLDLC SERPL CALC-MCNC: 12.6 MG/DL
WBC # BLD AUTO: 6.2 K/UL (ref 3.6–11)

## 2024-07-20 LAB — C PEPTIDE SERPL-MCNC: 7 NG/ML (ref 1.1–4.4)

## 2024-07-25 LAB — 25(OH)D3 SERPL-MCNC: 19.2 NG/ML (ref 30–100)

## 2024-07-26 ENCOUNTER — OFFICE VISIT (OUTPATIENT)
Age: 69
End: 2024-07-26

## 2024-07-26 VITALS
HEIGHT: 61 IN | WEIGHT: 180.6 LBS | SYSTOLIC BLOOD PRESSURE: 125 MMHG | OXYGEN SATURATION: 96 % | TEMPERATURE: 97.9 F | DIASTOLIC BLOOD PRESSURE: 65 MMHG | BODY MASS INDEX: 34.1 KG/M2 | HEART RATE: 71 BPM

## 2024-07-26 DIAGNOSIS — E55.9 VITAMIN D DEFICIENCY: ICD-10-CM

## 2024-07-26 DIAGNOSIS — Z98.84 BARIATRIC SURGERY STATUS: ICD-10-CM

## 2024-07-26 DIAGNOSIS — E16.2 HYPOGLYCEMIA, UNSPECIFIED: Primary | ICD-10-CM

## 2024-07-26 RX ORDER — DULAGLUTIDE 1.5 MG/.5ML
INJECTION, SOLUTION SUBCUTANEOUS
Qty: 6 ML | Refills: 3
Start: 2024-07-26

## 2024-07-26 NOTE — PATIENT INSTRUCTIONS
SPECIFIC INSTRUCTIONS BELOW           ACARBOSE 100  MG RIGHT BEFORE EACH MEAL          ON    trulicity   @  1.5 mg  a week  from pedrito [facility]         sagar  - FOR TRULICITY               -------------PAY ATTENTION TO THESE GENERAL INSTRUCTIONS -----------------      - The medications prescribed at this visit will not be available at pharmacy until 6 pm today        - your med list is not updated until the visit encounter is closed, so FOLLOW THE TYPED SPECIFIC INSTRUCTIONS  ABOVE     -ANY tests other than blood work, which you opt to do  outside the  [facility] imaging facilities, you are responsible for prior authorizations if  required    - health maintenance will not be updated  on AVS, so please ignore it       Results     *Normal results will not be notified by a phone call starting January 1 2024   *If you have an upcoming visit, the results will be discussed at the visit   *Please sign up for MY CHART if you want access to your lab and test results  *Abnormal results which require immediate attention will be notified by phone call   *Abnormal results which do not require immediate assistance will be notified in 1-2 weeks       Refills    -    have your pharmacy send us a refill request . Refills are done max for one year and a visit is a must before refills are extended    Follow up appointments -  highly encourage you to make it when you are checking out. We can accommodate you into the schedule based on your clinical situation, but not for extending refills beyond a year. Labs are important to give refills and it is important to get labs before the visit     Phone calls  -  Allow  24 hrs. for non-urgent calls to be returned  Prior authorization - It may take 2 to 4 weeks to process  Forms  -  FMLA, DMV etc., will take up to 2 weeks to process  Cancellations - please notify the office 2 days in advance   Samples  - will only be dispensed at visits       If not showing up for the appointment and/ or

## 2024-07-26 NOTE — PROGRESS NOTES
well-developed and well-nourished.    Psychiatric: She has a normal mood and affect.     No new labs            ASSESSMENT and PLAN        1.  Hypoglycemia - s/p gastric bypass - reactive     She is in constant worry about \" dropping low \" and passing out . Her  had a major stroke and is non verbal           July 2024    She agrees to eating more carbs since last visit , gained weight , suffered with more hypoglycemias    She will stay on trulicity and  acarbose   She is NOT consistently taking  trulicity as last year she did not get help with pt assistance      Val TORO Alvarez    is being seen for HYPOGLYCEMIAS -S/P GASTRIC BYPASS SURGERY   Patient has problematic  hypoglycemia  with  recurrent 3 hypoglycemic events  ( < 54 mg )  that changes her mental or physical state requiring EMS help to treat her hypoglycemia       Reviewed   DEXCOM clarity downloaded report for 14 days and discussed with pt     - dexcom - a1c is n/a  % for 14 days    - 14 day average glucose 102     - GMI  6 %    - 2 % for high and < 1  %  very high      3 %  low sugars and % in [employer]  Range  92 %     - percent of utiization 64   %   - CV% 29.7%          Jan 2024      She feels very secure since using  dexcom    Stay on trulicity and  acarbose   She is NOT consistently taking  trulicity as last year she did not get help with pt assistance   She will stay on acarbose        Val Alvarez    is being seen for HYPOGLYCEMIAS -S/P GASTRIC BYPASS SURGERY   Patient has problematic  hypoglycemia  with  recurrent 3 hypoglycemic events  ( < 54 mg )  that changes her mental or physical state requiring EMS help to treat her hypoglycemia       Reviewed   DEXCOM clarity downloaded report for 14 days and discussed with pt     - dexcom - a1c is n/a  % for 14 days    - 14 day average glucose 96    - GMI  6 %    -  > 1 % for high and < 1  %     2 %  low sugars and % in [employer]  Range  96 %     - percent of utiization 64   %   - CV% 29.7%

## 2024-08-07 ENCOUNTER — PATIENT MESSAGE (OUTPATIENT)
Age: 69
End: 2024-08-07

## 2024-08-07 RX ORDER — PREDNISONE 10 MG/1
TABLET ORAL
Qty: 21 EACH | Refills: 0 | Status: SHIPPED | OUTPATIENT
Start: 2024-08-07

## 2024-08-07 NOTE — TELEPHONE ENCOUNTER
Yumiko Mei, LPN 8/7/2024 11:27 AM EDT      ----- Message -----  From: Val Alvarez  Sent: 8/7/2024 11:23 AM EDT  To: John Norton  [address] Clinical Staff  Subject: Sciatica     Good Morning,   I’ve got it going on again. [facility]    Rachel Alvarez     Glycopyrrolate Counseling:  I discussed with the patient the risks of glycopyrrolate including but not limited to skin rash, drowsiness, dry mouth, difficulty urinating, and blurred vision.

## 2024-08-08 RX ORDER — GLUCOSAMINE HCL/CHONDROITIN SU 500-400 MG
CAPSULE ORAL
Qty: 300 STRIP | Refills: 3 | Status: SHIPPED | OUTPATIENT
Start: 2024-08-08

## 2024-08-12 RX ORDER — TRAZODONE HYDROCHLORIDE 100 MG/1
TABLET ORAL
Qty: 90 TABLET | Refills: 3 | Status: SHIPPED | OUTPATIENT
Start: 2024-08-12

## 2024-10-14 RX ORDER — ESCITALOPRAM OXALATE 20 MG/1
20 TABLET ORAL DAILY
Qty: 90 TABLET | Refills: 3 | Status: SHIPPED | OUTPATIENT
Start: 2024-10-14

## 2025-03-01 NOTE — RESULT ENCOUNTER NOTE
Your stress test reveals normal blood flow  to your heart muscle and this is great news.     All the best,     Shaun Sees
Medications

## 2025-04-23 ENCOUNTER — LAB (OUTPATIENT)
Age: 70
End: 2025-04-23

## 2025-04-23 DIAGNOSIS — E16.2 HYPOGLYCEMIA, UNSPECIFIED: ICD-10-CM

## 2025-04-23 DIAGNOSIS — Z98.84 BARIATRIC SURGERY STATUS: ICD-10-CM

## 2025-04-24 LAB
ALBUMIN SERPL-MCNC: 3.8 G/DL (ref 3.5–5)
ALBUMIN/GLOB SERPL: 1.2 (ref 1.1–2.2)
ALP SERPL-CCNC: 91 U/L (ref 45–117)
ALT SERPL-CCNC: 18 U/L (ref 12–78)
ANION GAP SERPL CALC-SCNC: 8 MMOL/L (ref 2–12)
AST SERPL-CCNC: 20 U/L (ref 15–37)
BILIRUB SERPL-MCNC: 0.3 MG/DL (ref 0.2–1)
BUN SERPL-MCNC: 30 MG/DL (ref 6–20)
BUN/CREAT SERPL: 33 (ref 12–20)
CALCIUM SERPL-MCNC: 9.1 MG/DL (ref 8.5–10.1)
CHLORIDE SERPL-SCNC: 108 MMOL/L (ref 97–108)
CHOLEST SERPL-MCNC: 173 MG/DL
CO2 SERPL-SCNC: 21 MMOL/L (ref 21–32)
CREAT SERPL-MCNC: 0.91 MG/DL (ref 0.55–1.02)
EST. AVERAGE GLUCOSE BLD GHB EST-MCNC: 103 MG/DL
GLOBULIN SER CALC-MCNC: 3.1 G/DL (ref 2–4)
GLUCOSE SERPL-MCNC: 87 MG/DL (ref 65–100)
HBA1C MFR BLD: 5.2 % (ref 4–5.6)
HDLC SERPL-MCNC: 59 MG/DL
HDLC SERPL: 2.9 (ref 0–5)
LDLC SERPL CALC-MCNC: 95.4 MG/DL (ref 0–100)
POTASSIUM SERPL-SCNC: 4.5 MMOL/L (ref 3.5–5.1)
PROT SERPL-MCNC: 6.9 G/DL (ref 6.4–8.2)
SODIUM SERPL-SCNC: 137 MMOL/L (ref 136–145)
TRIGL SERPL-MCNC: 93 MG/DL
VLDLC SERPL CALC-MCNC: 18.6 MG/DL

## 2025-06-18 ENCOUNTER — OFFICE VISIT (OUTPATIENT)
Age: 70
End: 2025-06-18
Payer: MEDICARE

## 2025-06-18 VITALS
TEMPERATURE: 98.3 F | DIASTOLIC BLOOD PRESSURE: 66 MMHG | HEIGHT: 61 IN | SYSTOLIC BLOOD PRESSURE: 139 MMHG | WEIGHT: 207 LBS | BODY MASS INDEX: 39.08 KG/M2 | OXYGEN SATURATION: 95 % | HEART RATE: 76 BPM

## 2025-06-18 DIAGNOSIS — E55.9 VITAMIN D DEFICIENCY: ICD-10-CM

## 2025-06-18 DIAGNOSIS — Z98.84 BARIATRIC SURGERY STATUS: ICD-10-CM

## 2025-06-18 DIAGNOSIS — E16.2 HYPOGLYCEMIA, UNSPECIFIED: Primary | ICD-10-CM

## 2025-06-18 DIAGNOSIS — E66.01 MORBID (SEVERE) OBESITY DUE TO EXCESS CALORIES (HCC): ICD-10-CM

## 2025-06-18 PROCEDURE — G8427 DOCREV CUR MEDS BY ELIG CLIN: HCPCS | Performed by: INTERNAL MEDICINE

## 2025-06-18 PROCEDURE — 3075F SYST BP GE 130 - 139MM HG: CPT | Performed by: INTERNAL MEDICINE

## 2025-06-18 PROCEDURE — 99214 OFFICE O/P EST MOD 30 MIN: CPT | Performed by: INTERNAL MEDICINE

## 2025-06-18 PROCEDURE — 1123F ACP DISCUSS/DSCN MKR DOCD: CPT | Performed by: INTERNAL MEDICINE

## 2025-06-18 PROCEDURE — 3078F DIAST BP <80 MM HG: CPT | Performed by: INTERNAL MEDICINE

## 2025-06-18 PROCEDURE — G8417 CALC BMI ABV UP PARAM F/U: HCPCS | Performed by: INTERNAL MEDICINE

## 2025-06-18 PROCEDURE — 1090F PRES/ABSN URINE INCON ASSESS: CPT | Performed by: INTERNAL MEDICINE

## 2025-06-18 PROCEDURE — 1159F MED LIST DOCD IN RCRD: CPT | Performed by: INTERNAL MEDICINE

## 2025-06-18 PROCEDURE — 1126F AMNT PAIN NOTED NONE PRSNT: CPT | Performed by: INTERNAL MEDICINE

## 2025-06-18 PROCEDURE — 1036F TOBACCO NON-USER: CPT | Performed by: INTERNAL MEDICINE

## 2025-06-18 PROCEDURE — 1160F RVW MEDS BY RX/DR IN RCRD: CPT | Performed by: INTERNAL MEDICINE

## 2025-06-18 PROCEDURE — 3017F COLORECTAL CA SCREEN DOC REV: CPT | Performed by: INTERNAL MEDICINE

## 2025-06-18 PROCEDURE — G8400 PT W/DXA NO RESULTS DOC: HCPCS | Performed by: INTERNAL MEDICINE

## 2025-06-18 RX ORDER — ACYCLOVIR 400 MG/1
TABLET ORAL
Qty: 9 EACH | Refills: 3 | Status: SHIPPED | OUTPATIENT
Start: 2025-06-18

## 2025-06-18 RX ORDER — ACARBOSE 100 MG/1
100 TABLET ORAL
Qty: 270 TABLET | Refills: 3 | Status: SHIPPED | OUTPATIENT
Start: 2025-06-18

## 2025-06-18 RX ORDER — IBUPROFEN 800 MG/1
800 TABLET, FILM COATED ORAL
COMMUNITY
Start: 2025-05-01

## 2025-06-18 NOTE — PROGRESS NOTES
Val Alvarez is a 70 y.o. female here for   Chief Complaint   Patient presents with    Hypoglycemia       1. Have you been to the ER, urgent care clinic since your last visit?  Hospitalized since your last visit? - no    2. Have you seen or consulted any other health care providers outside of the [facility] since your last visit?  Include any pap smears or colon screening.- no

## 2025-06-18 NOTE — PROGRESS NOTES
[facility] DIABETES AND ENDOCRINOLOGY                Renee Llanes MD FACE          HISTORY OF PRESENT ILLNESS      Val Alvarez is a 70   y.o. female.       follow up visit after  Last  visit for  Hypoglycemias after  nov virtual visit  in  July 2024     A gap of 11 months   Gained 27 lbs     She stayed busy with her sick / stroke    Her sensors  got dropped off   My cancellations  -         July 2024   Pt gained almost 30 lbs   She was not diet compliant   Recently, she has low sugars and realized that  diet needed change, so she is now back to eating  protein and veggies   She resumed acarbose also         Jan 2024    She had a gap  of 9 months     is sick    She now needs continuation of dexcom   and missed her follow up visit with me   Due for sending the notes     Gained 11 lbs by my scale since  April 2023 ( she received prednisone   for right sided knee joint issue )       April 2023       [facility]  is giving  supply brooklynn sensors now    She is in pain today , dislikes use of  NSAIDS because of her fear of developing ulcers            August 2021    She is referred by pcp   Supposed to see Dr. Way    As patient required immediate help, she is changed to my schedule    s/p bariatric surgery  In  July 2013 @ [facility]   By Dr. Mosher    5 years after surgery  - she started  Noticing hypoglycemias    The hypoglycemias as happening more often and are going to as low as 45 mg    She is afraid that her  cannot be of help because of the stroke he had and is non verbal    She is on Brooklynn 2 - she notices the discrepancy and hopes to get dexcom        Review of Systems    Right knee joint pain ( replacement  and  revision)  - after 2 times the corrective surgeries done on her        Physical Exam    Constitutional: She is oriented to person, place, and time. She appears well-developed and well-nourished.    Psychiatric: She has a normal mood and affect.     No new labs

## 2025-06-18 NOTE — PATIENT INSTRUCTIONS
SPECIFIC INSTRUCTIONS BELOW           ACARBOSE 100  MG RIGHT BEFORE EACH MEAL          ON    trulicity   @  0.75   mg  a week          Vit D  5000 int unit daily   OTC               -------------PAY ATTENTION TO THESE GENERAL INSTRUCTIONS -----------------      - The medications prescribed at this visit will not be available at pharmacy until 6 pm today        - your med list is not updated until the visit encounter is closed, so FOLLOW THE TYPED SPECIFIC INSTRUCTIONS  ABOVE     -ANY tests other than blood work, which you opt to do  outside the  [facility] imaging facilities, you are responsible for prior authorizations if  required    - health maintenance will not be updated  on AVS, so please ignore it       Results     *Normal results will not be notified by a phone call starting January 1 2024   *If you have an upcoming visit, the results will be discussed at the visit   *Please sign up for MY CHART if you want access to your lab and test results  *Abnormal results which require immediate attention will be notified by phone call   *Abnormal results which do not require immediate assistance will be notified in 1-2 weeks       Refills    -    have your pharmacy send us a refill request . Refills are done max for one year and a visit is a must before refills are extended    Follow up appointments -  highly encourage you to make it when you are checking out. We can accommodate you into the schedule based on your clinical situation, but not for extending refills beyond a year. Labs are important to give refills and it is important to get labs before the visit     Phone calls  -  Allow  24 hrs. for non-urgent calls to be returned  Prior authorization - It may take 2 to 4 weeks to process  Forms  -  FMLA, DMV etc., will take up to 2 weeks to process  Cancellations - please notify the office 2 days in advance   Samples  - will only be dispensed at visits       If not showing up for the appointment and/ or  cancelling

## 2025-06-19 RX ORDER — BUSPIRONE HYDROCHLORIDE 7.5 MG/1
TABLET ORAL
Qty: 180 TABLET | OUTPATIENT
Start: 2025-06-19

## 2025-06-30 NOTE — TELEPHONE ENCOUNTER
Informed pt PA submitted, waiting on insurance. Will call pt with answer once we receive from ins.

## 2025-07-01 ENCOUNTER — TELEPHONE (OUTPATIENT)
Age: 70
End: 2025-07-01

## 2025-07-02 NOTE — TELEPHONE ENCOUNTER
Will contact dexcom rep for approval for hypoglycemias     Ask her to wait       Renee Llanes MD

## 2025-07-21 NOTE — TELEPHONE ENCOUNTER
Patient is getting Dexcom from [employer]. Spoke with patient she received shipment on 7/15/2025.    PA approved 6/30/2025-6/30/2026 Auth # [id number] Dexcom [id number]

## 2025-08-14 DIAGNOSIS — Z98.84 BARIATRIC SURGERY STATUS: ICD-10-CM

## 2025-08-14 DIAGNOSIS — E55.9 VITAMIN D DEFICIENCY: ICD-10-CM

## 2025-08-14 RX ORDER — ERGOCALCIFEROL 1.25 MG/1
50000 CAPSULE, LIQUID FILLED ORAL WEEKLY
Qty: 12 CAPSULE | Refills: 3 | Status: SHIPPED | OUTPATIENT
Start: 2025-08-14

## (undated) DEVICE — 1010 S-DRAPE TOWEL DRAPE 10/BX: Brand: STERI-DRAPE™

## (undated) DEVICE — SPONGE LAP 18X18IN STRL -- 5/PK

## (undated) DEVICE — Device: Brand: JELCO

## (undated) DEVICE — DECANTER BAG 9": Brand: MEDLINE INDUSTRIES, INC.

## (undated) DEVICE — REM POLYHESIVE ADULT PATIENT RETURN ELECTRODE: Brand: VALLEYLAB

## (undated) DEVICE — SOLUTION IRRIG 3000ML 0.9% SOD CHL USP UROMATIC PLAS CONT

## (undated) DEVICE — BLADE SURG SAW STD S STL OSC W/ SERR EDGE DISP

## (undated) DEVICE — PENCIL SMK EVAC L10FT DIA95MM TBNG NONSTICK W ADPT TO 22MM

## (undated) DEVICE — ELECTRODE BLDE L4IN NONINSULATED EDGE

## (undated) DEVICE — DRESSING ANTIMIC FOAM OPTIFOAM POSTOP ADH 4X14 IN

## (undated) DEVICE — KIT LEG POS DISP -- MAKO

## (undated) DEVICE — GLOVE SURG SZ 9 L12IN FNGR THK79MIL GRN LTX FREE

## (undated) DEVICE — TAPE,CLOTH/SILK,CURAD,3"X10YD,LF,40/CS: Brand: CURAD

## (undated) DEVICE — 3M™ IOBAN™ 2 ANTIMICROBIAL INCISE DRAPE 6648EZ: Brand: IOBAN™ 2

## (undated) DEVICE — T4 HOOD

## (undated) DEVICE — BLADE SAW W9XL25MM THK051MM CUT THK074MM REPL S STL SAG

## (undated) DEVICE — INFECTION CONTROL KIT SYS

## (undated) DEVICE — IMMOBILIZER PREMIER PRO KNEE CUTAWAY CNTOUR 22INCH COOL BLU

## (undated) DEVICE — STERILE POLYISOPRENE POWDER-FREE SURGICAL GLOVES WITH EMOLLIENT COATING: Brand: PROTEXIS

## (undated) DEVICE — DERMABOND SKIN ADH 0.7ML -- DERMABOND ADVANCED 12/BX

## (undated) DEVICE — STRYKER PERFORMANCE SERIES SAGITTAL BLADE: Brand: STRYKER PERFORMANCE SERIES

## (undated) DEVICE — SUTURE VCRL SZ 2-0 L36IN ABSRB UD L36MM CT-1 1/2 CIR J945H

## (undated) DEVICE — PREP KIT PEEL PTCH POVIDONE IOD

## (undated) DEVICE — SPONGE GZ W4XL4IN COT 12 PLY TYP VII WVN C FLD DSGN

## (undated) DEVICE — MARKER,SKIN,WI/RULER AND LABELS: Brand: MEDLINE

## (undated) DEVICE — PADDING CST 6IN STERILE --

## (undated) DEVICE — PREP SKN CHLRAPRP APL 26ML STR --

## (undated) DEVICE — Device

## (undated) DEVICE — STERILE POLYISOPRENE POWDER-FREE SURGICAL GLOVES: Brand: PROTEXIS

## (undated) DEVICE — GLOVE SURG SZ 85 L12IN FNGR THK79MIL GRN LTX FREE

## (undated) DEVICE — COVER LT HNDL PLAS RIG 1 PER PK

## (undated) DEVICE — KIT PROC KNE TRACKING PK/1 -- VIZADISC MAKO

## (undated) DEVICE — SUTURE VCRL SZ 2-0 L36IN ABSRB UD L40MM CT 1/2 CIR J957H

## (undated) DEVICE — SOLUTION IRRIG 3000ML 0.9% SOD CHL FLX CONT 0797208] ICU MEDICAL INC]

## (undated) DEVICE — TOTAL JOINT-SFMC: Brand: MEDLINE INDUSTRIES, INC.

## (undated) DEVICE — DRAPE,EXTREMITY,89X128,STERILE: Brand: MEDLINE

## (undated) DEVICE — SUTURE STRATAFIX SYMMETRIC SZ 1 L18IN ABSRB VLT CT1 L36CM SXPP1A404

## (undated) DEVICE — SYR LR LCK 1ML GRAD NSAF 30ML --

## (undated) DEVICE — HOOD: Brand: FLYTE

## (undated) DEVICE — GOWN,SIRUS,NONRNF,SETINSLV,2XL,18/CS: Brand: MEDLINE

## (undated) DEVICE — BLUNTFILL: Brand: MONOJECT

## (undated) DEVICE — BONE PREPARATION KIT: Brand: BIOPREP

## (undated) DEVICE — SUTURE MCRYL SZ 3-0 L27IN ABSRB UD L24MM PS-1 3/8 CIR PRIM Y936H

## (undated) DEVICE — COVER,MAYO STAND,STERILE: Brand: MEDLINE

## (undated) DEVICE — CEMENT MIXING SYSTEM WITH FEMORAL BREAKWAY NOZZLE: Brand: REVOLUTION

## (undated) DEVICE — KIT DRP FOR RIO ROBOTIC ARM ASST SYS

## (undated) DEVICE — SMOKE EVACUATION PENCIL: Brand: VALLEYLAB

## (undated) DEVICE — STRAP,POSITIONING,KNEE/BODY,FOAM,4X60": Brand: MEDLINE

## (undated) DEVICE — XN CAL 1X3.0ML: Brand: XN CAL

## (undated) DEVICE — BANDAGE COMPR W6INXL10YD ST M E WHITE/BEIGE

## (undated) DEVICE — DUAL IRRIGATION ADAPTOR

## (undated) DEVICE — GLOVE SURG SZ 85 L12IN FNGR ORTHO 126MIL CRM LTX FREE

## (undated) DEVICE — NDL PRT INJ NSAF BLNT 18GX1.5 --

## (undated) DEVICE — SUTURE VCRL + SZ 1-0 L36IN ABSRB UD CTX 1/2 CIR TAPR PNT VCP977H

## (undated) DEVICE — ZIMMER® STERILE DISPOSABLE TOURNIQUET CUFF WITH PROTECTIVE SLEEVE AND PLC, DUAL PORT, SINGLE BLADDER, 34 IN. (86 CM)

## (undated) DEVICE — KIT INT FIX FEM TIB CKPT MAKOPLASTY

## (undated) DEVICE — NEEDLE HYPO 18GA L1.5IN PNK S STL HUB POLYPR SHLD REG BVL

## (undated) DEVICE — HANDPIECE SET WITH COAXIAL HIGH FLOW TIP AND SUCTION TUBE: Brand: INTERPULSE